# Patient Record
Sex: FEMALE | Race: WHITE | NOT HISPANIC OR LATINO | Employment: OTHER | URBAN - METROPOLITAN AREA
[De-identification: names, ages, dates, MRNs, and addresses within clinical notes are randomized per-mention and may not be internally consistent; named-entity substitution may affect disease eponyms.]

---

## 2017-01-26 DIAGNOSIS — Z00.00 ENCOUNTER FOR GENERAL ADULT MEDICAL EXAMINATION WITHOUT ABNORMAL FINDINGS: ICD-10-CM

## 2017-02-06 ENCOUNTER — HOSPITAL ENCOUNTER (OUTPATIENT)
Dept: RADIOLOGY | Age: 82
Discharge: HOME | End: 2017-02-06
Payer: MEDICARE

## 2017-02-06 DIAGNOSIS — Z12.31 ENCOUNTER FOR SCREENING MAMMOGRAM FOR MALIGNANT NEOPLASM OF BREAST: ICD-10-CM

## 2017-02-06 DIAGNOSIS — K29.70 GASTRITIS WITHOUT BLEEDING: ICD-10-CM

## 2017-02-06 DIAGNOSIS — H26.9 CATARACT: ICD-10-CM

## 2017-02-06 DIAGNOSIS — Z12.11 ENCOUNTER FOR SCREENING FOR MALIGNANT NEOPLASM OF COLON: ICD-10-CM

## 2017-02-06 DIAGNOSIS — R10.31 RIGHT LOWER QUADRANT PAIN: ICD-10-CM

## 2017-02-06 PROCEDURE — G0202 SCR MAMMO BI INCL CAD: HCPCS

## 2017-02-17 ENCOUNTER — ALLSCRIPTS OFFICE VISIT (OUTPATIENT)
Dept: OTHER | Facility: OTHER | Age: 82
End: 2017-02-17

## 2017-03-09 ENCOUNTER — GENERIC CONVERSION - ENCOUNTER (OUTPATIENT)
Dept: OTHER | Facility: OTHER | Age: 82
End: 2017-03-09

## 2017-03-10 ENCOUNTER — GENERIC CONVERSION - ENCOUNTER (OUTPATIENT)
Dept: OTHER | Facility: OTHER | Age: 82
End: 2017-03-10

## 2017-04-10 ENCOUNTER — ALLSCRIPTS OFFICE VISIT (OUTPATIENT)
Dept: OTHER | Facility: OTHER | Age: 82
End: 2017-04-10

## 2017-04-18 ENCOUNTER — ALLSCRIPTS OFFICE VISIT (OUTPATIENT)
Dept: OTHER | Facility: OTHER | Age: 82
End: 2017-04-18

## 2017-04-18 ENCOUNTER — HOSPITAL ENCOUNTER (OUTPATIENT)
Dept: RADIOLOGY | Age: 82
Discharge: HOME | End: 2017-04-18
Payer: MEDICARE

## 2017-04-18 ENCOUNTER — TRANSCRIBE ORDERS (OUTPATIENT)
Dept: ADMINISTRATIVE | Age: 82
End: 2017-04-18

## 2017-04-18 ENCOUNTER — APPOINTMENT (OUTPATIENT)
Dept: LAB | Age: 82
End: 2017-04-18
Payer: MEDICARE

## 2017-04-18 DIAGNOSIS — J40 BRONCHITIS: ICD-10-CM

## 2017-04-18 LAB
BASOPHILS # BLD AUTO: 0.01 THOUSANDS/ΜL (ref 0–0.1)
BASOPHILS NFR BLD AUTO: 0 % (ref 0–1)
EOSINOPHIL # BLD AUTO: 0.22 THOUSAND/ΜL (ref 0–0.61)
EOSINOPHIL NFR BLD AUTO: 2 % (ref 0–6)
ERYTHROCYTE [DISTWIDTH] IN BLOOD BY AUTOMATED COUNT: 13.9 % (ref 11.6–15.1)
HCT VFR BLD AUTO: 42 % (ref 34.8–46.1)
HGB BLD-MCNC: 13.6 G/DL (ref 11.5–15.4)
LYMPHOCYTES # BLD AUTO: 2.2 THOUSANDS/ΜL (ref 0.6–4.47)
LYMPHOCYTES NFR BLD AUTO: 24 % (ref 14–44)
MCH RBC QN AUTO: 29.6 PG (ref 26.8–34.3)
MCHC RBC AUTO-ENTMCNC: 32.4 G/DL (ref 31.4–37.4)
MCV RBC AUTO: 92 FL (ref 82–98)
MONOCYTES # BLD AUTO: 0.87 THOUSAND/ΜL (ref 0.17–1.22)
MONOCYTES NFR BLD AUTO: 9 % (ref 4–12)
NEUTROPHILS # BLD AUTO: 5.95 THOUSANDS/ΜL (ref 1.85–7.62)
NEUTS SEG NFR BLD AUTO: 65 % (ref 43–75)
NRBC BLD AUTO-RTO: 0 /100 WBCS
PLATELET # BLD AUTO: 305 THOUSANDS/UL (ref 149–390)
PMV BLD AUTO: 11 FL (ref 8.9–12.7)
RBC # BLD AUTO: 4.59 MILLION/UL (ref 3.81–5.12)
WBC # BLD AUTO: 9.29 THOUSAND/UL (ref 4.31–10.16)

## 2017-04-18 PROCEDURE — 85025 COMPLETE CBC W/AUTO DIFF WBC: CPT

## 2017-04-18 PROCEDURE — 71020 HB CHEST X-RAY 2VW FRONTAL&LATL: CPT

## 2017-04-18 PROCEDURE — 36415 COLL VENOUS BLD VENIPUNCTURE: CPT

## 2017-05-25 ENCOUNTER — TRANSCRIBE ORDERS (OUTPATIENT)
Dept: ADMINISTRATIVE | Age: 82
End: 2017-05-25

## 2017-05-25 ENCOUNTER — APPOINTMENT (OUTPATIENT)
Dept: LAB | Age: 82
End: 2017-05-25
Payer: MEDICARE

## 2017-05-25 DIAGNOSIS — I10 ESSENTIAL (PRIMARY) HYPERTENSION: ICD-10-CM

## 2017-05-25 DIAGNOSIS — Z12.11 ENCOUNTER FOR SCREENING FOR MALIGNANT NEOPLASM OF COLON: ICD-10-CM

## 2017-05-25 DIAGNOSIS — E78.2 MIXED HYPERLIPIDEMIA: ICD-10-CM

## 2017-05-25 DIAGNOSIS — M79.7 FIBROMYALGIA: ICD-10-CM

## 2017-05-25 DIAGNOSIS — F41.9 ANXIETY DISORDER: ICD-10-CM

## 2017-05-25 LAB
ALBUMIN SERPL BCP-MCNC: 3.7 G/DL (ref 3.5–5)
ALP SERPL-CCNC: 75 U/L (ref 46–116)
ALT SERPL W P-5'-P-CCNC: 22 U/L (ref 12–78)
ANION GAP SERPL CALCULATED.3IONS-SCNC: 6 MMOL/L (ref 4–13)
AST SERPL W P-5'-P-CCNC: 13 U/L (ref 5–45)
BACTERIA UR QL AUTO: ABNORMAL /HPF
BASOPHILS # BLD AUTO: 0.01 THOUSANDS/ΜL (ref 0–0.1)
BASOPHILS NFR BLD AUTO: 0 % (ref 0–1)
BILIRUB SERPL-MCNC: 0.52 MG/DL (ref 0.2–1)
BILIRUB UR QL STRIP: NEGATIVE
BUN SERPL-MCNC: 23 MG/DL (ref 5–25)
CALCIUM SERPL-MCNC: 9.5 MG/DL (ref 8.3–10.1)
CHLORIDE SERPL-SCNC: 108 MMOL/L (ref 100–108)
CHOLEST SERPL-MCNC: 290 MG/DL (ref 50–200)
CLARITY UR: ABNORMAL
CO2 SERPL-SCNC: 28 MMOL/L (ref 21–32)
COLOR UR: YELLOW
CREAT SERPL-MCNC: 0.7 MG/DL (ref 0.6–1.3)
EOSINOPHIL # BLD AUTO: 0.19 THOUSAND/ΜL (ref 0–0.61)
EOSINOPHIL NFR BLD AUTO: 3 % (ref 0–6)
ERYTHROCYTE [DISTWIDTH] IN BLOOD BY AUTOMATED COUNT: 13.6 % (ref 11.6–15.1)
GFR SERPL CREATININE-BSD FRML MDRD: >60 ML/MIN/1.73SQ M
GLUCOSE P FAST SERPL-MCNC: 93 MG/DL (ref 65–99)
GLUCOSE UR STRIP-MCNC: NEGATIVE MG/DL
HCT VFR BLD AUTO: 41.9 % (ref 34.8–46.1)
HDLC SERPL-MCNC: 64 MG/DL (ref 40–60)
HEMOCCULT STL QL IA: POSITIVE
HGB BLD-MCNC: 13.7 G/DL (ref 11.5–15.4)
HGB UR QL STRIP.AUTO: NEGATIVE
HYALINE CASTS #/AREA URNS LPF: ABNORMAL /LPF
KETONES UR STRIP-MCNC: NEGATIVE MG/DL
LDLC SERPL CALC-MCNC: 200 MG/DL (ref 0–100)
LEUKOCYTE ESTERASE UR QL STRIP: ABNORMAL
LYMPHOCYTES # BLD AUTO: 1.98 THOUSANDS/ΜL (ref 0.6–4.47)
LYMPHOCYTES NFR BLD AUTO: 29 % (ref 14–44)
MCH RBC QN AUTO: 29.7 PG (ref 26.8–34.3)
MCHC RBC AUTO-ENTMCNC: 32.7 G/DL (ref 31.4–37.4)
MCV RBC AUTO: 91 FL (ref 82–98)
MONOCYTES # BLD AUTO: 0.77 THOUSAND/ΜL (ref 0.17–1.22)
MONOCYTES NFR BLD AUTO: 11 % (ref 4–12)
NEUTROPHILS # BLD AUTO: 3.95 THOUSANDS/ΜL (ref 1.85–7.62)
NEUTS SEG NFR BLD AUTO: 57 % (ref 43–75)
NITRITE UR QL STRIP: POSITIVE
NON-SQ EPI CELLS URNS QL MICRO: ABNORMAL /HPF
NRBC BLD AUTO-RTO: 0 /100 WBCS
PH UR STRIP.AUTO: 6 [PH] (ref 4.5–8)
PLATELET # BLD AUTO: 273 THOUSANDS/UL (ref 149–390)
PMV BLD AUTO: 11.1 FL (ref 8.9–12.7)
POTASSIUM SERPL-SCNC: 4.5 MMOL/L (ref 3.5–5.3)
PROT SERPL-MCNC: 7.2 G/DL (ref 6.4–8.2)
PROT UR STRIP-MCNC: NEGATIVE MG/DL
RBC # BLD AUTO: 4.62 MILLION/UL (ref 3.81–5.12)
RBC #/AREA URNS AUTO: ABNORMAL /HPF
SODIUM SERPL-SCNC: 142 MMOL/L (ref 136–145)
SP GR UR STRIP.AUTO: 1.02 (ref 1–1.03)
TRIGL SERPL-MCNC: 128 MG/DL
UROBILINOGEN UR QL STRIP.AUTO: 0.2 E.U./DL
WBC # BLD AUTO: 6.91 THOUSAND/UL (ref 4.31–10.16)
WBC #/AREA URNS AUTO: ABNORMAL /HPF

## 2017-05-25 PROCEDURE — 81001 URINALYSIS AUTO W/SCOPE: CPT

## 2017-05-25 PROCEDURE — 80061 LIPID PANEL: CPT

## 2017-05-25 PROCEDURE — 85025 COMPLETE CBC W/AUTO DIFF WBC: CPT

## 2017-05-25 PROCEDURE — G0328 FECAL BLOOD SCRN IMMUNOASSAY: HCPCS

## 2017-05-25 PROCEDURE — 36415 COLL VENOUS BLD VENIPUNCTURE: CPT

## 2017-05-25 PROCEDURE — 80053 COMPREHEN METABOLIC PANEL: CPT

## 2017-06-05 ENCOUNTER — GENERIC CONVERSION - ENCOUNTER (OUTPATIENT)
Dept: OTHER | Facility: OTHER | Age: 82
End: 2017-06-05

## 2017-06-16 ENCOUNTER — ALLSCRIPTS OFFICE VISIT (OUTPATIENT)
Dept: OTHER | Facility: OTHER | Age: 82
End: 2017-06-16

## 2017-08-10 ENCOUNTER — GENERIC CONVERSION - ENCOUNTER (OUTPATIENT)
Dept: OTHER | Facility: OTHER | Age: 82
End: 2017-08-10

## 2017-08-30 ENCOUNTER — APPOINTMENT (OUTPATIENT)
Dept: LAB | Age: 82
End: 2017-08-30
Payer: MEDICARE

## 2017-08-30 ENCOUNTER — TRANSCRIBE ORDERS (OUTPATIENT)
Dept: ADMINISTRATIVE | Age: 82
End: 2017-08-30

## 2017-08-30 DIAGNOSIS — E55.9 UNSPECIFIED VITAMIN D DEFICIENCY: ICD-10-CM

## 2017-08-30 DIAGNOSIS — E55.9 UNSPECIFIED VITAMIN D DEFICIENCY: Primary | ICD-10-CM

## 2017-08-30 LAB
25(OH)D3 SERPL-MCNC: 17.1 NG/ML (ref 30–100)
ANION GAP SERPL CALCULATED.3IONS-SCNC: 8 MMOL/L (ref 4–13)
BUN SERPL-MCNC: 27 MG/DL (ref 5–25)
CALCIUM SERPL-MCNC: 9 MG/DL (ref 8.3–10.1)
CHLORIDE SERPL-SCNC: 104 MMOL/L (ref 100–108)
CO2 SERPL-SCNC: 25 MMOL/L (ref 21–32)
CREAT SERPL-MCNC: 0.8 MG/DL (ref 0.6–1.3)
GFR SERPL CREATININE-BSD FRML MDRD: 70 ML/MIN/1.73SQ M
GLUCOSE SERPL-MCNC: 73 MG/DL (ref 65–140)
POTASSIUM SERPL-SCNC: 4 MMOL/L (ref 3.5–5.3)
SODIUM SERPL-SCNC: 137 MMOL/L (ref 136–145)

## 2017-08-30 PROCEDURE — 80048 BASIC METABOLIC PNL TOTAL CA: CPT

## 2017-08-30 PROCEDURE — 82306 VITAMIN D 25 HYDROXY: CPT

## 2017-08-30 PROCEDURE — 36415 COLL VENOUS BLD VENIPUNCTURE: CPT

## 2017-09-14 ENCOUNTER — ALLSCRIPTS OFFICE VISIT (OUTPATIENT)
Dept: OTHER | Facility: OTHER | Age: 82
End: 2017-09-14

## 2017-10-16 ENCOUNTER — ALLSCRIPTS OFFICE VISIT (OUTPATIENT)
Dept: OTHER | Facility: OTHER | Age: 82
End: 2017-10-16

## 2017-10-26 NOTE — PROGRESS NOTES
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 for management of her fibromyalgia  #2 anxiety condition increase Xanax to 0 25 mg twice a day she's only been taking 1 tablet in the morning and half in the afternoon  She may now use one tablet twice a day in addition if she awakes in the middle of the night and has difficulty getting back to sleep she can take a pill at that time of Xanax 0 25 mg  #3 hypertension adequate control changed medication  #4 history of back pain in the neck and mid back this is with trigger points present likely to be fibromyalgia in nature  Previous attempts to treat her with antidepressant medications have proven unsuccessful due to side effects of the medication  #5 likely due to tension and or sinus symptoms  She is encouraged to continue with her Claritin and fluticasone nasal spray for management of her allergy symptoms  #6 abdominal bloating symptoms within normal  Examination of the abdomen  I suspect some of this is irritable bowel possibly as well as some degree of food intolerance  I've discussed with her the possibility of lactose intolerance and also a list of foods was discussed to decrease in her diet which may help decrease her intestinal flatulence  Discussion/Summary    In summary was spent approximate 45 minutes with this patient today on examination as well as discussion  We reviewed her multiple symptoms   I suspect that she has increasing stress levels which are causing a lot of her somatic symptoms such as sleep disturbance intestinal bloating and increasing her fibromyalgia symptoms  She has found a care provider for her  who is there for one day E3 hours that day so she does get occasional break  Encourage her to continue a respite visit for her  to a nursing home  total time of encounter was 45 minutes-- and-- 35 minutes was spent counseling  Chief Complaint  Patient is here today for a fibromyalgia flare up      History of Present Illness  HPI: This is an acute visit for this [de-identified] female patient  She presents today with multiple somatic symptoms  She indicates that she has felt bloated in the abdomen for several days with no flatulence or belching  She indicates that she is not feeling well and has an increase in her fibromyalgia symptoms in her neck and back region  She also indicates that she is had increased arthritic symptoms especially in her left knee and right hip area  She is not been sleeping well wakes up at night and has increased difficulty falling back to sleep  In addition she's had generalized headaches at times  She denies any fevers or chills  Review of Systems    Constitutional: feeling poorly-- and-- feeling tired, but-- no fever-- and-- no chills  ENT: Some nasal congestion and ear congestion  Cardiovascular: no complaints of slow or fast heart rate, no chest pain, no palpitations, no leg claudication or lower extremity edema  Respiratory: no complaints of shortness of breath, no wheezing, no dyspnea on exertion, no orthopnea or PND  Breasts: no complaints of breast pain, breast lump or nipple discharge  Gastrointestinal: A bloating feeling in the abdomen  Genitourinary: no complaints of dysuria, no incontinence, no pelvic pain, no dysmenorrhea, no vaginal discharge or abnormal vaginal bleeding  Musculoskeletal: arthralgias-- and-- myalgias, but-- as noted in HPI  Integumentary: no complaints of skin rash or lesion, no itching or dry skin, no skin wounds  Neurological: headache  Other Symptoms: Anxiety  Active Problems  1  Allergic rhinitis (477 9) (J30 9)   2  Anxiety (300 00) (F41 9)   3  Asymptomatic varicose veins (454 9) (I83 90)   4  Cataract, left (366 9) (H26 9)   5  Fatigue (780 79) (R53 83)   6  Fibromyalgia (729 1) (M79 7)   7  Gastritis (535 50) (K29 70)   8  Heme positive stool (792 1) (R19 5)   9  Hypertension (401 9) (I10)   10  Mid back pain (724 5) (M54 9)   11  Mixed hyperlipidemia (272 2) (E78 2)   12  Osteoarthritis (715 90) (M19 90)   13  Screen for colon cancer (V76 51) (Z12 11)   14  UTI symptoms (788 99) (R39 9)   15  Varicose veins (454 9) (I83 90)   16  Vitamin D deficiency (268 9) (E55 9)    Past Medical History  Active Problems And Past Medical History Reviewed: The active problems and past medical history were reviewed and updated today  Surgical History  Surgical History Reviewed: The surgical history was reviewed and updated today  Social History   · Never a smoker  The social history was reviewed and updated today  The social history was reviewed and is unchanged  Family History  Family History Reviewed: The family history was reviewed and updated today  Current Meds   1  ALPRAZolam 0 25 MG Oral Tablet; TAKE 1 TABLET TWICE DAILY; Therapy: 39EIT2377 to (Evaluate:51Qcm6216); Last Rx:00Kyu7995 Ordered   2  AmLODIPine Besylate 5 MG Oral Tablet; take 1 tablet by mouth every day; Therapy: 31AMG0111 to (Lilian Silva)  Requested for: 38YEE9679; Last   RA:95BUE3454 Ordered   3  Aspirin 81 MG TABS; Therapy: [phone]) to Recorded   4  Claritin 10 MG Oral Tablet; Therapy: [phone]) to Recorded   5  Fluticasone Propionate 50 MCG/ACT Nasal Suspension; USE 1 SPRAY IN EACH   NOSTRIL TWICE DAILY;    Therapy: 11PZY6987 to [phone])  Requested for: 26Jun2017; Last   AR:68WAT9762 Ordered   6  Lisinopril 40 MG Oral Tablet; Take 1 tablet daily; Therapy: 24TJO7095 to (PZVFZTCM:23WKX0960)  Requested for: 41IUS7624; Last   Rx:09Jan2017 Ordered   7  Lumigan SOLN;   Therapy: (Recorded:21Apr2016) to Recorded   8  Restasis 0 05 % Ophthalmic Emulsion; Therapy: ([phone]) to Recorded   9  Timolol Maleate 0 5 % Ophthalmic Solution; INSTILL 1 DROP INTO RIGHT EYE 2 TIMES   DAILY Recorded   10  Vitamin D 1000 UNIT Oral Tablet; TAKE 2 TABLET Daily; Therapy: 65IYU5113 to (Last Rx:23Fvo1714) Ordered    The medication list was reviewed and updated today  Allergies  1  Ciprofloxacin HCl TABS   2  Macrobid   3  Sulfa Drugs  4  Seasonal    Vitals   Recorded: 14Sep2017 02:28PM   Temperature 97 5 F   Heart Rate 88   Systolic 310   Diastolic 78   Height 5 ft 4 5 in   Weight 172 lb    BMI Calculated 29 07   BSA Calculated 1 84   O2 Saturation 93     Physical Exam    Constitutional   General appearance: No acute distress, well appearing and well nourished  Eyes   Conjunctiva and lids: No swelling, erythema or discharge  Ears, Nose, Mouth, and Throat   External inspection of ears and nose: Normal     Otoscopic examination: Tympanic membranes translucent with normal light reflex  Canals patent without erythema  Nasal mucosa, septum, and turbinates: Abnormal  -- Mild allergic irritation in the membranes with clear mucus in both nasal passages  Oropharynx: Normal with no erythema, edema, exudate or lesions  Pulmonary   Respiratory effort: No increased work of breathing or signs of respiratory distress  Auscultation of lungs: Clear to auscultation  Cardiovascular   Auscultation of heart: Normal rate and rhythm, normal S1 and S2, without murmurs      Examination of extremities for edema and/or varicosities: Normal     Abdomen   Abdomen: Abnormal  -- Normal active bowel sounds in all 4 quadrants mild bloating of the abdomen with tympanic sounds over the intestines no localizing tenderness or masses noted  Liver and spleen: No hepatomegaly or splenomegaly  Psychiatric   Orientation to person, place, and time: Normal     Mood and affect: Abnormal  -- Anxious  Future Appointments    Date/Time Provider Specialty Site   10/16/2017 01:45 PM JORDANA Phan   Internal Medicine [facility] INTERNAL MED     Signatures   Electronically signed by : JORDANA Sullivan ; Sep 14 2017  8:02PM EST                       (Author)

## 2017-11-01 NOTE — PROGRESS NOTES
Assessment    1  Anxiety (300 00) (F41 9)   2  Fibromyalgia (729 1) (M79 7)   3  Hypertension (401 9) (I10)   4  Mixed hyperlipidemia (272 2) (E78 2)   5  Allergic rhinitis (477 9) (J30 9)    Plan  Need for immunization against influenza    · Fluzone High-Dose 0 5 ML Intramuscular Suspension Prefilled Syringe  #1 anxiety secondary to caregiving responsibilities of the patient for her  who has advancing dementia  We did discuss possibility of a respite placement for her  at the present time she does not feel that that is necessary  The patient can continue to utilizes alprazolam 0 25 one half to one tablet 2-3 times daily for relief of her anxiety and stress  #2 hypertension adequate control continue on present therapy  #3 fibromyalgia symptoms worsening with stress levels increasing as well as a cooling temperatures Ambien only  That we did discuss possibly going back for physical therapy at present time she prefer not to pursue this  We'll continue on stretching and nonsteroidal anti-inflammatory therapy  We did discuss the possibility of using Lyrica for treatment of her fibromyalgia symptoms the patient is reluctant to use the medication due to the number of side effects noted with this drug  #4 mixed hyperlipidemia patient will continue on diet that is controlled in saturated fats  #5 allergic rhinitis the patient has found that a combination of Claritin and fluticasone nasal spray has been successful at controlling her allergic symptoms  Discussion/Summary  Discussion Summary:   In summary the patient presents today for routine follow-up visit we discussed at length her stress levels being elevated due to being a primary caregiver for her fate  who has advancing dementia  He continues to have well-controlled hypertension and will continue on lisinopril 40 mg daily and amlodipine 5 mg daily   She has a history of allergic rhinitis which is well-controlled on a combination of Claritin and fluticasone  Her fibromyalgia has flared recently most likely due to the stresses in her life  We discussed ways of controlling it including stretching exercises as well as nonsteroidal anti-inflammatories  Counseling Documentation With Imm: total time of encounter was 35 minutes-- and-- 25 minutes was spent counseling  Chief Complaint  Chief Complaint Free Text Note Form: patient is here for a 4 month follow up  History of Present Illness  HPI: This is a routine 4 month follow-up visit for this 82-year-old female patient  She continues to have symptoms of fibromyalgia  The symptoms are intensified by the high level of stress that she is functioning under being 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  Patient also has symptoms of anxiety at times and is utilizing alprazolam on an as-needed basis with good results  She has a history of hypertension and hyperlipidemia as well  Review of Systems  Complete-Female:  Constitutional: feeling poorly-- and-- feeling tired  Eyes: No complaints of eye pain, no red eyes, no eyesight problems, no discharge, no dry eyes, no itching of eyes  ENT: no complaints of earache, no loss of hearing, no nose bleeds, no nasal discharge, no sore throat, no hoarseness  Cardiovascular: No complaints of slow heart rate, no fast heart rate, no chest pain, no palpitations, no leg claudication, no lower extremity edema  Respiratory: No complaints of shortness of breath, no wheezing, no cough, no SOB on exertion, no orthopnea, no PND  Gastrointestinal: No complaints of abdominal pain, no constipation, no nausea or vomiting, no diarrhea, no bloody stools  Genitourinary: No complaints of dysuria, no incontinence, no pelvic pain, no dysmenorrhea, no vaginal discharge or bleeding  Musculoskeletal: Fibromyalgia-type pain with trigger points in the neck region and back region    Integumentary: No complaints of skin rash or lesions, no itching, no skin wounds, no breast pain or lump  Neurological: No complaints of headache, no confusion, no convulsions, no numbness, no dizziness or fainting, no tingling, no limb weakness, no difficulty walking  Psychiatric: anxiety  Endocrine: No complaints of proptosis, no hot flashes, no muscle weakness, no deepening of the voice, no feelings of weakness  Hematologic/Lymphatic: No complaints of swollen glands, no swollen glands in the neck, does not bleed easily, does not bruise easily  Active Problems  1  Abdominal bloating (787 3) (R14 0)   2  Allergic rhinitis (477 9) (J30 9)   3  Anxiety (300 00) (F41 9)   4  Asymptomatic varicose veins (454 9) (I83 90)   5  Cataract, left (366 9) (H26 9)   6  Fatigue (780 79) (R53 83)   7  Fibromyalgia (729 1) (M79 7)   8  Gastritis (535 50) (K29 70)   9  Headache (784 0) (R51)   10  Heme positive stool (792 1) (R19 5)   11  Hypertension (401 9) (I10)   12  Mid back pain (724 5) (M54 9)   13  Mixed hyperlipidemia (272 2) (E78 2)   14  Osteoarthritis (715 90) (M19 90)   15  Screen for colon cancer (V76 51) (Z12 11)   16  Sleep disturbance (780 50) (G47 9)   17  UTI symptoms (788 99) (R39 9)   18  Varicose veins (454 9) (I83 90)   19  Vitamin D deficiency (268 9) (E55 9)    Past Medical History  1  History of Acute lower UTI (599 0) (N39 0)   2  History of Encounter for screening mammogram for breast cancer (V76 12) (Z12 31)   3  History of bronchitis (V12 69) (Z87 09)   4  History of epistaxis (V12 69) (Z87 898)   5  History of influenza (V12 09) (Z87 09)   6  History of urinary tract infection (V13 02) (Z87 440)   7  History of Right lower quadrant abdominal pain (789 03) (R10 31)   8  History of Saphenous vein thrombophlebitis, right (451 0) (I80 01)  Active Problems And Past Medical History Reviewed: The active problems and past medical history were reviewed and updated today  Surgical History  1  History of Gallbladder Surgery  Surgical History Reviewed:    The surgical history was reviewed and updated today  Family History  Mother    1  Family history of Hypertension  Family History Reviewed: The family history was reviewed and updated today  Social History     · Never a smoker  Social History Reviewed: The social history was reviewed and updated today  The social history was reviewed and is unchanged  Current Meds   1  ALPRAZolam 0 25 MG Oral Tablet; TAKE 1 TABLET TWICE DAILY; Therapy: 27ZHX0785 to (Evaluate:88Ulm5439); Last Rx:69Ubc2562 Ordered   2  AmLODIPine Besylate 5 MG Oral Tablet; take 1 tablet by mouth every day; Therapy: 78HMU5841 to (Isai Cobos)  Requested for: 71NKU9806; Last LS:22YQB7705 Ordered   3  Aspirin 81 MG TABS; Therapy: (0372-5610005) to Recorded   4  Claritin 10 MG Oral Tablet; Therapy: (0372-5610005) to Recorded   5  Fluticasone Propionate 50 MCG/ACT Nasal Suspension; USE 1 SPRAY IN EACH NOSTRIL TWICE DAILY; Therapy: 00PLH9831 to (NKDCVNBI:40XNH5861)  Requested for: 26Jun2017; Last NX:31ZPD4256 Ordered   6  Lisinopril 40 MG Oral Tablet; Take 1 tablet daily; Therapy: 18VBX9937 to (AMWWNVES:98NTY1734)  Requested for: 58LFF1015; Last Rx:09Jan2017 Ordered   7  Lumigan SOLN; Therapy: (Recorded:21Apr2016) to Recorded   8  RaNITidine HCl - 150 MG Oral Tablet; One pill daily prior to breakfast; Therapy: 97Cze2155 to (Evaluate:12Apr2018)  Requested for: 07Boe4731; Last Rx:86Jvv2112 Ordered   9  Restasis 0 05 % Ophthalmic Emulsion; Therapy: (5332-5610005) to Recorded   10  Timolol Maleate 0 5 % Ophthalmic Solution; INSTILL 1 DROP INTO RIGHT EYE 2 TIMES DAILY  Recorded   11  Vitamin D 1000 UNIT Oral Tablet; TAKE 2 TABLET Daily; Therapy: 57EIR9756 to (Last Rx:89Hri7167) Ordered  Medication List Reviewed: The medication list was reviewed and updated today  Allergies  1  Ciprofloxacin HCl TABS   2  Macrobid   3  Sulfa Drugs  4   Seasonal    Vitals  Vital Signs    Recorded: 33NXW9595 01:35PM   Temperature 97 3 F   Heart Rate 98   Respiration 14   Systolic 908   Diastolic 74   Height 5 ft 4 5 in   Weight 171 lb 0 8 oz   BMI Calculated 28 91   BSA Calculated 1 84   O2 Saturation 99       Physical Exam   Constitutional  General appearance: No acute distress, well appearing and well nourished  Eyes  Conjunctiva and lids: No swelling, erythema or discharge  Ears, Nose, Mouth, and Throat  External inspection of ears and nose: Normal    Pulmonary  Respiratory effort: No increased work of breathing or signs of respiratory distress  Auscultation of lungs: Clear to auscultation  Cardiovascular  Auscultation of heart: Normal rate and rhythm, normal S1 and S2, without murmurs  Future Appointments    Date/Time Provider Specialty Site   12/12/2017 02:45 PM JORDANA Palmer   Internal Medicine [facility] INTERNAL MED       Signatures   Electronically signed by : JORDANA Encarnacion ; Oct 16 2017  7:35PM EST                       (Author)

## 2017-11-13 ENCOUNTER — GENERIC CONVERSION - ENCOUNTER (OUTPATIENT)
Dept: INTERNAL MEDICINE CLINIC | Facility: CLINIC | Age: 82
End: 2017-11-13

## 2017-11-13 ENCOUNTER — GENERIC CONVERSION - ENCOUNTER (OUTPATIENT)
Dept: OTHER | Facility: OTHER | Age: 82
End: 2017-11-13

## 2017-12-12 ENCOUNTER — GENERIC CONVERSION - ENCOUNTER (OUTPATIENT)
Dept: OTHER | Facility: OTHER | Age: 82
End: 2017-12-12

## 2017-12-12 DIAGNOSIS — I10 ESSENTIAL (PRIMARY) HYPERTENSION: ICD-10-CM

## 2017-12-12 DIAGNOSIS — E55.9 VITAMIN D DEFICIENCY: ICD-10-CM

## 2017-12-12 DIAGNOSIS — E78.2 MIXED HYPERLIPIDEMIA: ICD-10-CM

## 2018-01-12 VITALS
WEIGHT: 173.38 LBS | HEART RATE: 102 BPM | SYSTOLIC BLOOD PRESSURE: 124 MMHG | RESPIRATION RATE: 16 BRPM | OXYGEN SATURATION: 98 % | HEIGHT: 65 IN | TEMPERATURE: 98.4 F | DIASTOLIC BLOOD PRESSURE: 72 MMHG | BODY MASS INDEX: 28.89 KG/M2

## 2018-01-13 VITALS
SYSTOLIC BLOOD PRESSURE: 118 MMHG | BODY MASS INDEX: 29.3 KG/M2 | OXYGEN SATURATION: 97 % | RESPIRATION RATE: 16 BRPM | DIASTOLIC BLOOD PRESSURE: 68 MMHG | HEART RATE: 72 BPM | WEIGHT: 173.4 LBS | TEMPERATURE: 97.1 F

## 2018-01-14 VITALS
OXYGEN SATURATION: 98 % | SYSTOLIC BLOOD PRESSURE: 112 MMHG | RESPIRATION RATE: 16 BRPM | HEART RATE: 92 BPM | BODY MASS INDEX: 28.7 KG/M2 | DIASTOLIC BLOOD PRESSURE: 70 MMHG | WEIGHT: 172.25 LBS | HEIGHT: 65 IN | TEMPERATURE: 97.7 F

## 2018-01-14 VITALS
HEART RATE: 98 BPM | DIASTOLIC BLOOD PRESSURE: 74 MMHG | TEMPERATURE: 97.3 F | RESPIRATION RATE: 14 BRPM | BODY MASS INDEX: 28.5 KG/M2 | WEIGHT: 171.05 LBS | OXYGEN SATURATION: 99 % | SYSTOLIC BLOOD PRESSURE: 124 MMHG | HEIGHT: 65 IN

## 2018-01-14 VITALS
DIASTOLIC BLOOD PRESSURE: 78 MMHG | HEART RATE: 88 BPM | HEIGHT: 65 IN | TEMPERATURE: 97.5 F | OXYGEN SATURATION: 93 % | WEIGHT: 172 LBS | SYSTOLIC BLOOD PRESSURE: 114 MMHG | BODY MASS INDEX: 28.66 KG/M2

## 2018-01-15 DIAGNOSIS — Z12.31 ENCOUNTER FOR SCREENING MAMMOGRAM FOR MALIGNANT NEOPLASM OF BREAST: ICD-10-CM

## 2018-01-22 VITALS
SYSTOLIC BLOOD PRESSURE: 128 MMHG | WEIGHT: 13.04 LBS | TEMPERATURE: 97.4 F | BODY MASS INDEX: 2.17 KG/M2 | HEIGHT: 65 IN | DIASTOLIC BLOOD PRESSURE: 64 MMHG | HEART RATE: 87 BPM | RESPIRATION RATE: 14 BRPM | OXYGEN SATURATION: 96 %

## 2018-01-22 VITALS
HEART RATE: 85 BPM | BODY MASS INDEX: 28.55 KG/M2 | WEIGHT: 171.38 LBS | TEMPERATURE: 97.5 F | RESPIRATION RATE: 16 BRPM | OXYGEN SATURATION: 98 % | HEIGHT: 65 IN | DIASTOLIC BLOOD PRESSURE: 70 MMHG | SYSTOLIC BLOOD PRESSURE: 124 MMHG

## 2018-01-22 VITALS — BODY MASS INDEX: 29.24 KG/M2 | WEIGHT: 173 LBS

## 2018-01-24 VITALS
SYSTOLIC BLOOD PRESSURE: 118 MMHG | HEART RATE: 81 BPM | RESPIRATION RATE: 14 BRPM | DIASTOLIC BLOOD PRESSURE: 66 MMHG | OXYGEN SATURATION: 96 % | BODY MASS INDEX: 29.16 KG/M2 | TEMPERATURE: 97.3 F | WEIGHT: 175.04 LBS | HEIGHT: 65 IN

## 2018-01-24 NOTE — PROGRESS NOTES
Assessment    1  Allergic rhinitis (477 9) (J30 9)   2  Anxiety (300 00) (F41 9)   3  Asymptomatic varicose veins (454 9) (I83 90)   4  Gastritis (535 50) (K29 70)   5  Hypertension (401 9) (I10)   6  Vitamin D deficiency (268 9) (E55 9)    Plan  Allergic rhinitis, Anxiety, Health Maintenance, Fibromyalgia, Gastritis, Hypertension,  Mixed hyperlipidemia    · (1) CBC/PLT/DIFF; Status:Active; Requested for:46Vdh1604;    · (1) COMPREHENSIVE METABOLIC PANEL; Status:Active; Requested for:92Uaw3050;    · (1) LIPID PANEL FASTING W DIRECT LDL REFLEX; Status:Active; Requested  for:45Bpl2434; Allergic rhinitis, Anxiety, Health Maintenance, Fibromyalgia, Gastritis, Hypertension,  Mixed hyperlipidemia, Screen for colon cancer    · (1) OCCULT BLOOD, FECAL IMMUNOCHEMICAL TEST; Status:Active; Requested  for:17Feb2017;    · (1) URINALYSIS (will reflex a microscopy if leukocytes, occult blood, protein or nitrites  are not within normal limits); Status:Active; Requested for:02Hhi8396; Anxiety    · ALPRAZolam 0 25 MG Oral Tablet (Xanax); TAKE 1 TABLET TWICE DAILY  Hypertension    · EKG/ECG- POC; Status:Active - Perform Order; Requested for:17Feb2017;   Need for vaccination with 13-polyvalent pneumococcal conjugate vaccine    · Prevnar 13 Intramuscular Suspension    #1 allergic rhinitis continue present medications of nasal steroids and antihistamine treatment for symptomatic control  #2 history of anxiety patient is under increased stress levels due to  who has advancing dementia  She utilizes alprazolam 0 25 mg twice a day with good control of her anxiety symptoms  #3 asymptomatic varicose veins in the legs bilaterally no evidence of any venous thrombosis or localized irritation  Intervention support stockings  #4 history of gastritis symptoms presently taking famotidine 20 mg twice a day for symptomatic control  #5 hypertension adequate control continue amlodipine medication along with lisinopril medication    #5 history of vitamin D deficiency on replacement plan on laboratory assessment of vitamin D levels  Discussion/Summary  In summary the patient appears to be medically stable we've asked her to obtain a laboratory testing for routine assessment of blood count chemistries lipids vitamin D value  See the patient for her next routine blood pressure checkup in 4 months  Reviewed the need for testing testing including mammogram OB/GYN visit and colonoscopy at 5 year intervals  The lifestyle and diet were reviewed with the patient  Chief Complaint  patient is here for an AWV  History of Present Illness  HPI: 68-year-old female patient presents today for annual wellness visit  She has no new issues or complaints  She has a history of attention stridorous osteoarthritis vitamin D deficiency varicose veins of the lower legs anxiety and allergic rhinitis  Welcome to [employer] and Wellness Visits: The patient is being seen for the initial annual wellness visit  Medicare Screening and Risk Factors   Hospitalizations: no previous hospitalizations  Once per lifetime medicare screening tests: ECG  Medicare Screening Tests Risk Questions   Drug and Alcohol Use: The patient has never smoked cigarettes  The patient reports never drinking alcohol and drinking 1 drinks per month  She has never used illicit drugs  Diet and Physical Activity: Current diet includes well balanced meals  She exercises daily  Exercise: walking, stretching  Mood Disorder and Cognitive Impairment Screening:   Depression screening was done using  Functional Ability/Level of Safety: Hearing is normal bilaterally and a hearing aid is not used  Fall risk factors: The patient fell 0 times in the past 12 months  Advance Directives: Advance directives: unknown  Concerns with the patient's end of life decisions: unknown     Co-Managers and Medical Equipment/Suppliers: See Patient Care Team      Patient Care Team    Care Team Member Role Specialty Office Number   Keyanna Wood [facility]  Vascular Surgery [phone]     Review of Systems    Constitutional: negative  Eyes: negative  ENT: negative  Cardiovascular: negative  Respiratory: negative  Gastrointestinal: negative  Genitourinary: negative  Musculoskeletal: negative  Integumentary and Breasts: negative  Neurological: negative  Psychiatric: negative  Endocrine: negative  Hematologic and Lymphatic: negative  Active Problems    1  Allergic rhinitis (477 9) (J30 9)   2  Anxiety (300 00) (F41 9)   3  Asymptomatic varicose veins (454 9) (I83 90)   4  Cataract, left (366 9) (H26 9)   5  Dry eye syndrome (375 15) (H04 129)   6  Fibromyalgia (729 1) (M79 7)   7  Gastritis (535 50) (K29 70)   8  Hypertension (401 9) (I10)   9  Mixed hyperlipidemia (272 2) (E78 2)   10  Need for immunization against influenza (V04 81) (Z23)   11  Osteoarthritis (715 90) (M19 90)   12  Screen for colon cancer (V76 51) (Z12 11)   13  Varicose veins (454 9) (I86 8)   14  Vitamin D deficiency (268 9) (E55 9)    Past Medical History    · History of Encounter for screening mammogram for breast cancer (V76 12) (Z12 31)   · History of epistaxis (V12 69) (G32 993)   · History of influenza (V12 09) (Z87 09)   · History of urinary tract infection (V13 02) (Z87 440)   · History of Right lower quadrant abdominal pain (789 03) (R10 31)   · History of Saphenous vein thrombophlebitis, right (451 0) (I80 01)    The active problems and past medical history were reviewed and updated today  Surgical History    · History of Gallbladder Surgery    The surgical history was reviewed and updated today  Family History  Mother    · Family history of Hypertension    The family history was reviewed and updated today  Social History    · Never a smoker  The social history was reviewed and updated today  The social history was reviewed and is unchanged  Current Meds   1   ALPRAZolam 0 25 MG Oral Tablet; TAKE 1 TABLET TWICE DAILY; Last Rx:03Nov2016   Ordered   2  AmLODIPine Besylate 5 MG Oral Tablet; Take 1 tablet daily; Therapy: 43ZWM3691 to (Evaluate:19Apr2017)  Requested for: 75Qri5214; Last   Rx:21Sep2016 Ordered   3  Aspirin 81 MG TABS; Therapy: (73 811 282) to Recorded   4  Claritin 10 MG Oral Tablet; Therapy: (73 811 282) to Recorded   5  Famotidine 20 MG Oral Tablet; take 1 tablet twice a day; Therapy: 93GPI7894 to (Last Rx:09Nov2016) Ordered   6  Fluticasone Propionate 50 MCG/ACT Nasal Suspension; USE 1 SPRAY IN EACH   NOSTRIL TWICE DAILY; Therapy: 78OZU6445 to (Evaluate:06Jun2017)  Requested for: 39TMO3747; Last   Rx:08Nov2016 Ordered   7  Lisinopril 40 MG Oral Tablet; Take 1 tablet daily; Therapy: 36KPM4344 to (FKPJKCAR:75QOB2268)  Requested for: 46BXI1658; Last   Rx:09Jan2017 Ordered   8  Lumigan SOLN;   Therapy: (Recorded:21Apr2016) to Recorded   9  Oseltamivir Phosphate 75 MG Oral Capsule; TAKE 1 CAPSULE TWICE DAILY; Therapy: 15ETV1851 to (Evaluate:16Jan2017)  Requested for: 56OXI3231; Last   Rx:11Jan2017 Ordered   10  Prochlorperazine Maleate 10 MG Oral Tablet; TAKE 1 TABLET EVERY 6 HOURS AS    NEEDED FOR NAUSEA; Therapy: 58EDX6572 to (Evaluate:17Jan2017)  Requested for: 98CVO6685; Last    Rx:11Jan2017 Ordered   11  Restasis 0 05 % Ophthalmic Emulsion; Therapy: (73 811 282) to Recorded   12  Timolol Maleate 0 5 % Ophthalmic Solution; INSTILL 1 DROP INTO RIGHT EYE 2    TIMES DAILY Recorded   13  Vitamin D 1000 UNIT Oral Tablet; TAKE 2 TABLET Daily; Therapy: 56SCZ5798 to (Last Rx:52Rew2614) Ordered    The medication list was reviewed and updated today  Allergies    1  Ciprofloxacin HCl TABS   2  Sulfa Drugs    3   Seasonal    Immunizations   1    Influenza  18-Oct-2016     Vitals  Signs    Temperature: 97 5 F  Heart Rate: 85  Respiration: 16  Systolic: 532  Diastolic: 70  Height: 5 ft 4 5 in  Weight: 171 lb 6 08 oz  BMI Calculated: 28 96  BSA Calculated: 1 84  O2 Saturation: 98    Physical Exam    Constitutional   General appearance: No acute distress, well appearing and well nourished  Head and Face   Head and face: Normal     Eyes   Conjunctiva and lids: No swelling, erythema or discharge  Pupils and irises: Equal, round, reactive to light  Ophthalmoscopic examination: Normal fundi and optic discs  Ears, Nose, Mouth, and Throat   External inspection of ears and nose: Normal     Otoscopic examination: Tympanic membranes translucent with normal light reflex  Canals patent without erythema  Nasal mucosa, septum, and turbinates: Normal without edema or erythema  Lips, teeth, and gums: Normal, good dentition  Oropharynx: Normal with no erythema, edema, exudate or lesions  Neck   Neck: Supple, symmetric, trachea midline, no masses  Thyroid: Normal, no thyromegaly  Pulmonary   Respiratory effort: No increased work of breathing or signs of respiratory distress  Percussion of chest: Normal     Auscultation of lungs: Clear to auscultation  Cardiovascular   Auscultation of heart: Normal rate and rhythm, normal S1 and S2, no murmurs  Carotid pulses: 2+ bilaterally  Pedal pulses: 2+ bilaterally  Peripheral vascular exam: Normal     Examination of extremities for edema and/or varicosities: Normal     Abdomen   Abdomen: Non-tender, no masses  Liver and spleen: No hepatomegaly or splenomegaly  Lymphatic   Palpation of lymph nodes in neck: No lymphadenopathy  Musculoskeletal   Gait and station: Normal     Digits and nails: Normal without clubbing or cyanosis  Joints, bones, and muscles: Normal     Range of motion: Normal     Stability: Normal     Muscle strength/tone: Normal     Skin   Skin and subcutaneous tissue: Normal without rashes or lesions  Palpation of skin and subcutaneous tissue: Normal turgor  Neurologic   Cranial nerves: Cranial nerves II-XII intact  Cortical function: Normal mental status      Reflexes: 2+ and symmetric  Sensation: No sensory loss  Coordination: Normal finger to nose and heel to shin  Psychiatric   Judgment and insight: Normal     Orientation to person, place, and time: Normal     Recent and remote memory: Intact  Mood and affect: Normal        Procedure    Procedure: Visual Acuity Test    Indication: routine screening  Inforrmation supplied by a Snellen chart  Results: 20/200 in both eyes without corrective device, 20/200 in the right eye without corrective device, 20/200 in the left eye without corrective device, 20/30 in both eyes with corrective device, 20/30 in the right eye with corrective device, 20/40 in the left eye with corrective device   Color vision was screened with the Ishihara Test and the results were abnormal    The patient tolerated the procedure well and was cooperative  There were no complications  Future Appointments    Date/Time Provider Specialty Site   06/16/2017 01:00 PM JORDANA Mcgregor   Internal Medicine [facility] INTERNAL MED     Signatures   Electronically signed by : JORDANA Wilkerson ; Feb 18 2017  5:39PM EST                       (Author)

## 2018-02-02 ENCOUNTER — TELEPHONE (OUTPATIENT)
Dept: INTERNAL MEDICINE CLINIC | Facility: CLINIC | Age: 83
End: 2018-02-02

## 2018-02-02 DIAGNOSIS — R11.0 NAUSEA: Primary | ICD-10-CM

## 2018-02-02 RX ORDER — ONDANSETRON 4 MG/1
4 TABLET, FILM COATED ORAL EVERY 8 HOURS PRN
Qty: 10 TABLET | Refills: 1 | Status: SHIPPED | OUTPATIENT
Start: 2018-02-02 | End: 2020-05-08 | Stop reason: ALTCHOICE

## 2018-02-02 NOTE — PROGRESS NOTES
Patient call she has been experiencing some GI symptoms over the past 24 hours nausea without vomiting as well as some loose bowel movements/diarrhea  She feels a bit achy and out of sorts  She is not running a temperature elevation has no chest congestion or cough  We have started her on clear liquids for the next 24 hours recommend that she use Imodium right ear 1 tablet after each loose bowel movement up to 6 doses in 24 hours and also order Zofran 4 mg Q 8 hours for nausea symptoms  She will follow up in the office of her symptoms fail to improve

## 2018-02-07 ENCOUNTER — TRANSCRIBE ORDERS (OUTPATIENT)
Dept: RADIOLOGY | Facility: CLINIC | Age: 83
End: 2018-02-07

## 2018-02-12 ENCOUNTER — HOSPITAL ENCOUNTER (OUTPATIENT)
Dept: RADIOLOGY | Age: 83
Discharge: HOME | End: 2018-02-12
Payer: MEDICARE

## 2018-02-12 DIAGNOSIS — Z12.31 ENCOUNTER FOR SCREENING MAMMOGRAM FOR MALIGNANT NEOPLASM OF BREAST: ICD-10-CM

## 2018-02-12 PROCEDURE — 77067 SCR MAMMO BI INCL CAD: CPT

## 2018-02-22 ENCOUNTER — APPOINTMENT (OUTPATIENT)
Dept: LAB | Age: 83
End: 2018-02-22
Payer: MEDICARE

## 2018-02-22 DIAGNOSIS — E78.2 MIXED HYPERLIPIDEMIA: ICD-10-CM

## 2018-02-22 DIAGNOSIS — E55.9 VITAMIN D DEFICIENCY: ICD-10-CM

## 2018-02-22 DIAGNOSIS — I10 ESSENTIAL (PRIMARY) HYPERTENSION: ICD-10-CM

## 2018-02-22 LAB
25(OH)D3 SERPL-MCNC: 30.5 NG/ML (ref 30–100)
ALBUMIN SERPL BCP-MCNC: 3.8 G/DL (ref 3.5–5)
ALP SERPL-CCNC: 90 U/L (ref 46–116)
ALT SERPL W P-5'-P-CCNC: 22 U/L (ref 12–78)
ANION GAP SERPL CALCULATED.3IONS-SCNC: 6 MMOL/L (ref 4–13)
AST SERPL W P-5'-P-CCNC: 17 U/L (ref 5–45)
BILIRUB SERPL-MCNC: 0.54 MG/DL (ref 0.2–1)
BUN SERPL-MCNC: 22 MG/DL (ref 5–25)
CALCIUM SERPL-MCNC: 9.8 MG/DL (ref 8.3–10.1)
CHLORIDE SERPL-SCNC: 106 MMOL/L (ref 100–108)
CHOLEST SERPL-MCNC: 297 MG/DL (ref 50–200)
CO2 SERPL-SCNC: 27 MMOL/L (ref 21–32)
CREAT SERPL-MCNC: 0.75 MG/DL (ref 0.6–1.3)
GFR SERPL CREATININE-BSD FRML MDRD: 76 ML/MIN/1.73SQ M
GLUCOSE P FAST SERPL-MCNC: 93 MG/DL (ref 65–99)
HDLC SERPL-MCNC: 62 MG/DL (ref 40–60)
LDLC SERPL CALC-MCNC: 203 MG/DL (ref 0–100)
POTASSIUM SERPL-SCNC: 4.3 MMOL/L (ref 3.5–5.3)
PROT SERPL-MCNC: 7.6 G/DL (ref 6.4–8.2)
SODIUM SERPL-SCNC: 139 MMOL/L (ref 136–145)
TRIGL SERPL-MCNC: 161 MG/DL

## 2018-02-22 PROCEDURE — 80053 COMPREHEN METABOLIC PANEL: CPT

## 2018-02-22 PROCEDURE — 80061 LIPID PANEL: CPT

## 2018-02-22 PROCEDURE — 36415 COLL VENOUS BLD VENIPUNCTURE: CPT

## 2018-02-22 PROCEDURE — 82306 VITAMIN D 25 HYDROXY: CPT

## 2018-02-23 ENCOUNTER — OFFICE VISIT (OUTPATIENT)
Dept: INTERNAL MEDICINE CLINIC | Facility: CLINIC | Age: 83
End: 2018-02-23
Payer: MEDICARE

## 2018-02-23 VITALS
TEMPERATURE: 98 F | SYSTOLIC BLOOD PRESSURE: 134 MMHG | DIASTOLIC BLOOD PRESSURE: 68 MMHG | RESPIRATION RATE: 16 BRPM | HEART RATE: 72 BPM | OXYGEN SATURATION: 98 %

## 2018-02-23 DIAGNOSIS — I10 ESSENTIAL HYPERTENSION: Primary | ICD-10-CM

## 2018-02-23 DIAGNOSIS — M79.7 FIBROMYALGIA: ICD-10-CM

## 2018-02-23 DIAGNOSIS — F41.9 ANXIETY: ICD-10-CM

## 2018-02-23 PROCEDURE — 99214 OFFICE O/P EST MOD 30 MIN: CPT | Performed by: INTERNAL MEDICINE

## 2018-02-23 RX ORDER — ERGOCALCIFEROL 1.25 MG/1
2000 CAPSULE ORAL DAILY
Refills: 5 | COMMUNITY
Start: 2017-11-19 | End: 2020-05-08 | Stop reason: SDUPTHER

## 2018-02-23 RX ORDER — LISINOPRIL 40 MG/1
1 TABLET ORAL DAILY
COMMUNITY
Start: 2018-01-02 | End: 2018-12-21 | Stop reason: SDUPTHER

## 2018-02-23 RX ORDER — ALPRAZOLAM 0.25 MG/1
0.25 TABLET ORAL 2 TIMES DAILY
Refills: 3 | COMMUNITY
Start: 2018-02-04 | End: 2018-05-07 | Stop reason: SDUPTHER

## 2018-02-23 RX ORDER — LORATADINE 10 MG/1
1 TABLET ORAL DAILY
COMMUNITY

## 2018-02-23 RX ORDER — FAMOTIDINE 20 MG/1
1 TABLET, FILM COATED ORAL DAILY
COMMUNITY
Start: 2018-01-07

## 2018-02-23 RX ORDER — FLUTICASONE PROPIONATE 50 MCG
1 SPRAY, SUSPENSION (ML) NASAL 2 TIMES DAILY
Refills: 6 | COMMUNITY
Start: 2018-02-05 | End: 2018-08-03 | Stop reason: SDUPTHER

## 2018-02-23 RX ORDER — AMLODIPINE BESYLATE 2.5 MG/1
2.5 TABLET ORAL DAILY
COMMUNITY
Start: 2018-01-15 | End: 2018-06-29 | Stop reason: ALTCHOICE

## 2018-02-24 NOTE — ASSESSMENT & PLAN NOTE
Hypertension control at this time is fineContinue continue on present medication no apparent side effects of lisinopril 40 mg daily  Also continue on amlodipine 2 and 0 5 mg daily without any evidence of side effects

## 2018-02-24 NOTE — ASSESSMENT & PLAN NOTE
Anxiety symptoms continue  The patient is gradually coming to a comfort level with her  living in assisted living  She continues use alprazolam 0 25 mg twice a day  No evidence of any abuse of the medication

## 2018-02-24 NOTE — PROGRESS NOTES
Assessment/Plan:    Hypertension  Hypertension control at this time is fineContinue continue on present medication no apparent side effects of lisinopril 40 mg daily  Also continue on amlodipine 2 and 0 5 mg daily without any evidence of side effects  Anxiety  Anxiety symptoms continue  The patient is gradually coming to a comfort level with her  living in assisted living  She continues use alprazolam 0 25 mg twice a day  No evidence of any abuse of the medication  Fibromyalgia  Fibromyalgia symptoms continue to be present intermittently I think they are most likely related to stress in her life will be interesting to see if they decreased now that her  is living in assisted living and she is no longer the primary care for provider for him  Diagnoses and all orders for this visit:    Essential hypertension    Fibromyalgia    Anxiety    Other orders  -     ALPRAZolam (XANAX) 0 25 mg tablet; Take 0 25 mg by mouth 2 (two) times a day  -     amLODIPine (NORVASC) 2 5 mg tablet; Take 2 5 tablets by mouth daily  -     aspirin 81 MG tablet; Take 1 tablet by mouth daily  -     loratadine (CLARITIN) 10 mg tablet; Take 1 tablet by mouth daily  -     ergocalciferol (VITAMIN D2) 50,000 units; Take 2,000 Units by mouth daily   -     famotidine (PEPCID) 20 mg tablet; Take 1 tablet by mouth daily  -     fluticasone (FLONASE) 50 mcg/act nasal spray; 1 spray 2 (two) times a day  -     lisinopril (ZESTRIL) 40 mg tablet; Take 1 tablet by mouth daily        Subjective:      Patient ID: Arabella Germain is a [de-identified] y o  female  This 82-year-old female patient presents today in the company of her   Recently she admitted her  to [address] [place] [address] dementia Unit  She has been visiting him on a regular basis at the facility  He has been a significant transition for both her and her     She did provide excellent care for him at home for many years despite his advancing dementia  Patient continues to have some anxiety but seems to be in general more relieved and relaxed since she has placed her  in the assisted living facility  I suspect she will continue to adjust to this transition and most likely do well  The following portions of the patient's history were reviewed and updated as appropriate:   She  has a past medical history of Epistaxis and Saphenous vein thrombophlebitis, right (2012)  She   Patient Active Problem List    Diagnosis Date Noted    Anxiety 10/09/2014    Asymptomatic varicose veins 10/09/2014    Fibromyalgia 10/09/2014    Hypertension 10/09/2014     She  has a past surgical history that includes Gallbladder surgery (2006)  Her family history includes Hypertension in her mother  She  reports that she has never smoked  She does not have any smokeless tobacco history on file  Her alcohol and drug histories are not on file  Current Outpatient Prescriptions   Medication Sig Dispense Refill    ALPRAZolam (XANAX) 0 25 mg tablet Take 0 25 mg by mouth 2 (two) times a day  3    amLODIPine (NORVASC) 2 5 mg tablet Take 2 5 tablets by mouth daily      aspirin 81 MG tablet Take 1 tablet by mouth daily      ergocalciferol (VITAMIN D2) 50,000 units Take 2,000 Units by mouth daily   5    famotidine (PEPCID) 20 mg tablet Take 1 tablet by mouth daily      fluticasone (FLONASE) 50 mcg/act nasal spray 1 spray 2 (two) times a day  6    lisinopril (ZESTRIL) 40 mg tablet Take 1 tablet by mouth daily      loratadine (CLARITIN) 10 mg tablet Take 1 tablet by mouth daily      ondansetron (ZOFRAN) 4 mg tablet Take 1 tablet (4 mg total) by mouth every 8 (eight) hours as needed for nausea or vomiting 10 tablet 1     No current facility-administered medications for this visit       Review of Systems   All other systems reviewed and are negative          Objective:      /68   Pulse 72   Temp 98 °F (36 7 °C) (Tympanic)   Resp 16   SpO2 98% Physical Exam   Constitutional: She is oriented to person, place, and time  She appears well-developed and well-nourished  HENT:   Right Ear: Tympanic membrane and external ear normal    Left Ear: Tympanic membrane and external ear normal    Nose: Nose normal    Mouth/Throat: Uvula is midline, oropharynx is clear and moist and mucous membranes are normal    Eyes: Conjunctivae are normal  Pupils are equal, round, and reactive to light  Neck: No thyromegaly present  Cardiovascular: Normal rate, regular rhythm, normal heart sounds and intact distal pulses  No murmur heard  Pulmonary/Chest: Effort normal and breath sounds normal  No respiratory distress  She has no wheezes  Abdominal: Soft  Bowel sounds are normal    Musculoskeletal: Normal range of motion  She exhibits no edema  Lymphadenopathy:     She has no cervical adenopathy  Neurological: She is alert and oriented to person, place, and time  She has normal reflexes  Skin: Skin is warm, dry and intact  Psychiatric: She has a normal mood and affect   Her speech is normal and behavior is normal  Judgment and thought content normal

## 2018-02-24 NOTE — ASSESSMENT & PLAN NOTE
Fibromyalgia symptoms continue to be present intermittently I think they are most likely related to stress in her life will be interesting to see if they decreased now that her  is living in assisted living and she is no longer the primary care for provider for him

## 2018-05-07 DIAGNOSIS — F41.9 ANXIETY: Primary | ICD-10-CM

## 2018-05-07 RX ORDER — ALPRAZOLAM 0.25 MG/1
TABLET ORAL
Qty: 60 TABLET | Refills: 1 | Status: SHIPPED | OUTPATIENT
Start: 2018-05-07 | End: 2018-06-29 | Stop reason: SDUPTHER

## 2018-05-31 DIAGNOSIS — I10 ESSENTIAL HYPERTENSION: Primary | ICD-10-CM

## 2018-05-31 RX ORDER — AMLODIPINE BESYLATE 5 MG/1
TABLET ORAL
Qty: 30 TABLET | Refills: 5 | Status: SHIPPED | OUTPATIENT
Start: 2018-05-31 | End: 2018-11-26 | Stop reason: SDUPTHER

## 2018-06-04 ENCOUNTER — TRANSCRIBE ORDERS (OUTPATIENT)
Dept: ADMINISTRATIVE | Age: 83
End: 2018-06-04

## 2018-06-04 ENCOUNTER — APPOINTMENT (OUTPATIENT)
Dept: LAB | Age: 83
End: 2018-06-04
Payer: MEDICARE

## 2018-06-04 DIAGNOSIS — M54.5 ACUTE LOW BACK PAIN, UNSPECIFIED BACK PAIN LATERALITY, WITH SCIATICA PRESENCE UNSPECIFIED: ICD-10-CM

## 2018-06-04 DIAGNOSIS — M54.5 ACUTE LOW BACK PAIN, UNSPECIFIED BACK PAIN LATERALITY, WITH SCIATICA PRESENCE UNSPECIFIED: Primary | ICD-10-CM

## 2018-06-04 LAB
ALBUMIN SERPL BCP-MCNC: 3.5 G/DL (ref 3.5–5)
ALP SERPL-CCNC: 72 U/L (ref 46–116)
ALT SERPL W P-5'-P-CCNC: 22 U/L (ref 12–78)
ANION GAP SERPL CALCULATED.3IONS-SCNC: 8 MMOL/L (ref 4–13)
AST SERPL W P-5'-P-CCNC: 13 U/L (ref 5–45)
BASOPHILS # BLD AUTO: 0.03 THOUSANDS/ΜL (ref 0–0.1)
BASOPHILS NFR BLD AUTO: 0 % (ref 0–1)
BILIRUB SERPL-MCNC: 0.43 MG/DL (ref 0.2–1)
BUN SERPL-MCNC: 26 MG/DL (ref 5–25)
CALCIUM SERPL-MCNC: 9.2 MG/DL (ref 8.3–10.1)
CHLORIDE SERPL-SCNC: 105 MMOL/L (ref 100–108)
CO2 SERPL-SCNC: 26 MMOL/L (ref 21–32)
CREAT SERPL-MCNC: 0.76 MG/DL (ref 0.6–1.3)
CRP SERPL QL: <3 MG/L
EOSINOPHIL # BLD AUTO: 0.23 THOUSAND/ΜL (ref 0–0.61)
EOSINOPHIL NFR BLD AUTO: 3 % (ref 0–6)
ERYTHROCYTE [DISTWIDTH] IN BLOOD BY AUTOMATED COUNT: 12.9 % (ref 11.6–15.1)
ERYTHROCYTE [SEDIMENTATION RATE] IN BLOOD: 16 MM/HOUR (ref 0–20)
GFR SERPL CREATININE-BSD FRML MDRD: 74 ML/MIN/1.73SQ M
GLUCOSE SERPL-MCNC: 81 MG/DL (ref 65–140)
HCT VFR BLD AUTO: 44.1 % (ref 34.8–46.1)
HGB BLD-MCNC: 14 G/DL (ref 11.5–15.4)
IMM GRANULOCYTES # BLD AUTO: 0.02 THOUSAND/UL (ref 0–0.2)
IMM GRANULOCYTES NFR BLD AUTO: 0 % (ref 0–2)
LYMPHOCYTES # BLD AUTO: 2.08 THOUSANDS/ΜL (ref 0.6–4.47)
LYMPHOCYTES NFR BLD AUTO: 28 % (ref 14–44)
MCH RBC QN AUTO: 29.8 PG (ref 26.8–34.3)
MCHC RBC AUTO-ENTMCNC: 31.7 G/DL (ref 31.4–37.4)
MCV RBC AUTO: 94 FL (ref 82–98)
MONOCYTES # BLD AUTO: 0.83 THOUSAND/ΜL (ref 0.17–1.22)
MONOCYTES NFR BLD AUTO: 11 % (ref 4–12)
NEUTROPHILS # BLD AUTO: 4.29 THOUSANDS/ΜL (ref 1.85–7.62)
NEUTS SEG NFR BLD AUTO: 58 % (ref 43–75)
NRBC BLD AUTO-RTO: 0 /100 WBCS
PLATELET # BLD AUTO: 267 THOUSANDS/UL (ref 149–390)
PMV BLD AUTO: 11 FL (ref 8.9–12.7)
POTASSIUM SERPL-SCNC: 3.9 MMOL/L (ref 3.5–5.3)
PROT SERPL-MCNC: 6.8 G/DL (ref 6.4–8.2)
RBC # BLD AUTO: 4.7 MILLION/UL (ref 3.81–5.12)
SODIUM SERPL-SCNC: 139 MMOL/L (ref 136–145)
WBC # BLD AUTO: 7.48 THOUSAND/UL (ref 4.31–10.16)

## 2018-06-04 PROCEDURE — 86140 C-REACTIVE PROTEIN: CPT

## 2018-06-04 PROCEDURE — 80053 COMPREHEN METABOLIC PANEL: CPT

## 2018-06-04 PROCEDURE — 86430 RHEUMATOID FACTOR TEST QUAL: CPT

## 2018-06-04 PROCEDURE — 86617 LYME DISEASE ANTIBODY: CPT

## 2018-06-04 PROCEDURE — 86038 ANTINUCLEAR ANTIBODIES: CPT

## 2018-06-04 PROCEDURE — 86200 CCP ANTIBODY: CPT

## 2018-06-04 PROCEDURE — 85652 RBC SED RATE AUTOMATED: CPT

## 2018-06-04 PROCEDURE — 36415 COLL VENOUS BLD VENIPUNCTURE: CPT

## 2018-06-04 PROCEDURE — 85025 COMPLETE CBC W/AUTO DIFF WBC: CPT

## 2018-06-05 ENCOUNTER — APPOINTMENT (OUTPATIENT)
Dept: LAB | Age: 83
End: 2018-06-05
Payer: MEDICARE

## 2018-06-05 LAB
BACTERIA UR QL AUTO: ABNORMAL /HPF
BILIRUB UR QL STRIP: NEGATIVE
CLARITY UR: ABNORMAL
COLOR UR: YELLOW
GLUCOSE UR STRIP-MCNC: NEGATIVE MG/DL
HGB UR QL STRIP.AUTO: NEGATIVE
HYALINE CASTS #/AREA URNS LPF: ABNORMAL /LPF
KETONES UR STRIP-MCNC: NEGATIVE MG/DL
LEUKOCYTE ESTERASE UR QL STRIP: ABNORMAL
NITRITE UR QL STRIP: POSITIVE
NON-SQ EPI CELLS URNS QL MICRO: ABNORMAL /HPF
PH UR STRIP.AUTO: 6 [PH] (ref 4.5–8)
PROT UR STRIP-MCNC: NEGATIVE MG/DL
RBC #/AREA URNS AUTO: ABNORMAL /HPF
RHEUMATOID FACT SER QL LA: NEGATIVE
SP GR UR STRIP.AUTO: 1.01 (ref 1–1.03)
UROBILINOGEN UR QL STRIP.AUTO: 0.2 E.U./DL
WBC #/AREA URNS AUTO: ABNORMAL /HPF

## 2018-06-05 PROCEDURE — 81001 URINALYSIS AUTO W/SCOPE: CPT | Performed by: PHYSICIAN ASSISTANT

## 2018-06-06 LAB

## 2018-06-29 ENCOUNTER — OFFICE VISIT (OUTPATIENT)
Dept: INTERNAL MEDICINE CLINIC | Facility: CLINIC | Age: 83
End: 2018-06-29
Payer: MEDICARE

## 2018-06-29 VITALS
HEART RATE: 80 BPM | RESPIRATION RATE: 16 BRPM | BODY MASS INDEX: 28.75 KG/M2 | OXYGEN SATURATION: 98 % | DIASTOLIC BLOOD PRESSURE: 68 MMHG | TEMPERATURE: 97.7 F | SYSTOLIC BLOOD PRESSURE: 124 MMHG | WEIGHT: 168.4 LBS | HEIGHT: 64 IN

## 2018-06-29 DIAGNOSIS — F41.9 ANXIETY: ICD-10-CM

## 2018-06-29 DIAGNOSIS — M79.7 FIBROMYALGIA: ICD-10-CM

## 2018-06-29 DIAGNOSIS — G47.9 SLEEP DISTURBANCE: ICD-10-CM

## 2018-06-29 DIAGNOSIS — E78.5 HYPERLIPIDEMIA, UNSPECIFIED HYPERLIPIDEMIA TYPE: Primary | ICD-10-CM

## 2018-06-29 DIAGNOSIS — I10 ESSENTIAL HYPERTENSION: ICD-10-CM

## 2018-06-29 PROCEDURE — 99214 OFFICE O/P EST MOD 30 MIN: CPT | Performed by: INTERNAL MEDICINE

## 2018-06-29 RX ORDER — CONJUGATED ESTROGENS 0.62 MG/G
CREAM VAGINAL
Refills: 2 | COMMUNITY
Start: 2018-04-06

## 2018-06-29 RX ORDER — ALPRAZOLAM 0.25 MG/1
0.25 TABLET ORAL 2 TIMES DAILY
Qty: 60 TABLET | Refills: 4 | Status: SHIPPED | OUTPATIENT
Start: 2018-06-29 | End: 2018-12-03 | Stop reason: SDUPTHER

## 2018-06-29 RX ORDER — TAFLUPROST 0 MG/.3ML
SOLUTION/ DROPS OPHTHALMIC
Refills: 6 | COMMUNITY
Start: 2018-06-10

## 2018-06-29 RX ORDER — TIMOLOL MALEATE 5 MG/ML
SOLUTION/ DROPS OPHTHALMIC
Refills: 2 | COMMUNITY
Start: 2018-06-18

## 2018-06-29 NOTE — ASSESSMENT & PLAN NOTE
Persistent anxiety symptoms secondary to family issues with her  living in a nursing home with advanced Alzheimer's disease  Reviewed with her the proper use of alprazolam today no indication of dependence or abuse of the medication  She can take 0 25 milligrams twice a day  Second dose should fall close to bedtime to help her sleep at night if she does not fall asleep within 45 minutes to an hour she can take a 2nd dose of 0 25 milligrams to help her fall asleep

## 2018-06-29 NOTE — ASSESSMENT & PLAN NOTE
Sleep disturbance please see dictation under plan for anxiety as well as HPI which outline are medication recommendation to treat and address her sleep disturbance

## 2018-06-29 NOTE — ASSESSMENT & PLAN NOTE
History of hypertension blood pressure is well controlled at the present time continue present blood pressure medications with follow-up assessment requested for 4 months

## 2018-06-29 NOTE — PROGRESS NOTES
Assessment/Plan:    Hypertension  History of hypertension blood pressure is well controlled at the present time continue present blood pressure medications with follow-up assessment requested for 4 months  Anxiety  Persistent anxiety symptoms secondary to family issues with her  living in a nursing home with advanced Alzheimer's disease  Reviewed with her the proper use of alprazolam today no indication of dependence or abuse of the medication  She can take 0 25 milligrams twice a day  Second dose should fall close to bedtime to help her sleep at night if she does not fall asleep within 45 minutes to an hour she can take a 2nd dose of 0 25 milligrams to help her fall asleep  Fibromyalgia  Increase in fibromyalgia symptoms likely due to stress level she is functioning under recently  Patient reassured symptoms do not represent anything more than muscular and skeletal type of symptoms  Sleep disturbance  Sleep disturbance please see dictation under plan for anxiety as well as HPI which outline are medication recommendation to treat and address her sleep disturbance  Diagnoses and all orders for this visit:    Hyperlipidemia, unspecified hyperlipidemia type  -     Lipid panel; Future  -     Comprehensive metabolic panel; Future    Anxiety  -     ALPRAZolam (XANAX) 0 25 mg tablet; Take 1 tablet (0 25 mg total) by mouth 2 (two) times a day    Fibromyalgia    Essential hypertension    Other orders  -     RESTASIS MULTIDOSE 0 05 % ophthalmic emulsion;   -     PREMARIN vaginal cream; APPLY 1/4 INCH WITH FINGER TO VAGINA MONDAY AND FRIDAY AT BEDTIME  -     ZIOPTAN 0 0015 % ophthalmic solution; ADMINISTER 1 DROP OPHTHALMICALLY AT BEDTIME IN THE RIGHT EYE, BRAND NECESSARY  -     timolol (TIMOPTIC) 0 5 % ophthalmic solution; INSTILL 1 DROP INTO RIGHT EYE EVERY DAY        Subjective:      Patient ID: Liliana Cortez is a 82 y o  female      This 30-year-old female patient presents today for a routine 4 month visit  She continues to have high levels of stress as her  is presently a patient in a skilled nursing facility  She discussed with me set today some of the issues she is in countered over the past several weeks with his care  She continues to have difficulty sleeping at night sometimes having a hard time falling asleep or falling asleep and then waking up in can return to sleep  I discussed the alprazolam that she takes indicated that if she takes a dose at bedtime and has not fallen asleep within 45 minutes to an hour she can take a 2nd dose of 0 25 milligrams  She has no signs of dependence or abuse of this medication  Patient also has symptoms of fibromyalgia in her chest area and the pectoralis as well as trapezius muscles  I suspect this is increased recently due to her stress level  The following portions of the patient's history were reviewed and updated as appropriate: She  has a past medical history of Epistaxis and Saphenous vein thrombophlebitis, right (2012)  She  has a past surgical history that includes Gallbladder surgery (2006)  Her family history includes Hypertension in her mother  She  reports that she has never smoked  She does not have any smokeless tobacco history on file  Her alcohol and drug histories are not on file       Review of Systems   Constitutional: Positive for fatigue  Musculoskeletal: Positive for arthralgias and myalgias  Psychiatric/Behavioral: Positive for sleep disturbance  The patient is nervous/anxious  All other systems reviewed and are negative  Objective:      /68 (BP Location: Left arm, Patient Position: Sitting, Cuff Size: Standard)   Pulse 80   Temp 97 7 °F (36 5 °C)   Resp 16   Ht 5' 4" (1 626 m)   Wt 76 4 kg (168 lb 6 4 oz)   SpO2 98%   BMI 28 91 kg/m²          Physical Exam   Constitutional: She is oriented to person, place, and time  Vital signs are normal  She appears well-developed and well-nourished  She is cooperative  HENT:   Right Ear: Hearing, tympanic membrane, external ear and ear canal normal    Left Ear: Hearing, tympanic membrane, external ear and ear canal normal    Nose: Nose normal  No mucosal edema  Mouth/Throat: Uvula is midline, oropharynx is clear and moist and mucous membranes are normal    Eyes: Conjunctivae and lids are normal  Pupils are equal, round, and reactive to light  Neck: No JVD present  Carotid bruit is not present  No thyromegaly present  Cardiovascular: Normal rate, regular rhythm, normal heart sounds and intact distal pulses  No murmur heard  Pulmonary/Chest: Effort normal and breath sounds normal  No respiratory distress  She has no wheezes  She has no rales  She exhibits no tenderness  Abdominal: Soft  Normal appearance and bowel sounds are normal    Musculoskeletal: Normal range of motion  She exhibits no edema  Lymphadenopathy:     She has no cervical adenopathy  Neurological: She is alert and oriented to person, place, and time  She has normal reflexes  Skin: Skin is warm, dry and intact  Psychiatric: She has a normal mood and affect  Her speech is normal and behavior is normal  Judgment and thought content normal  Cognition and memory are normal    Vitals reviewed

## 2018-06-29 NOTE — ASSESSMENT & PLAN NOTE
Increase in fibromyalgia symptoms likely due to stress level she is functioning under recently  Patient reassured symptoms do not represent anything more than muscular and skeletal type of symptoms

## 2018-07-20 ENCOUNTER — TELEPHONE (OUTPATIENT)
Dept: INTERNAL MEDICINE CLINIC | Facility: CLINIC | Age: 83
End: 2018-07-20

## 2018-07-20 NOTE — TELEPHONE ENCOUNTER
Pt had bumped big toe on left foot last week, it bled but was fine  She bumped it again recently and now feels loose  She asked for a call back with any instruction/guidance at [phone]

## 2018-08-03 DIAGNOSIS — T78.40XD ALLERGIC STATE, SUBSEQUENT ENCOUNTER: Primary | ICD-10-CM

## 2018-08-03 RX ORDER — FLUTICASONE PROPIONATE 50 MCG
SPRAY, SUSPENSION (ML) NASAL
Qty: 16 G | Refills: 6 | Status: SHIPPED | OUTPATIENT
Start: 2018-08-03 | End: 2018-12-26 | Stop reason: SDUPTHER

## 2018-09-04 ENCOUNTER — TELEPHONE (OUTPATIENT)
Dept: INTERNAL MEDICINE CLINIC | Facility: CLINIC | Age: 83
End: 2018-09-04

## 2018-09-04 NOTE — TELEPHONE ENCOUNTER
The patient called she was recently prescribed a z-anupam, she would like to know if she could also take robitussin for cough or ibuprofen for bodyaches

## 2018-09-25 ENCOUNTER — TELEPHONE (OUTPATIENT)
Dept: INTERNAL MEDICINE CLINIC | Facility: CLINIC | Age: 83
End: 2018-09-25

## 2018-09-25 NOTE — TELEPHONE ENCOUNTER
Patient is having a cough non-productive and is taking Robitussin for it but she is having a very dry, raspy bothersome sore throat is there anything she can take for it besides her using cough drops and gargling, she just wants to know if there is anything else she can do or take for this

## 2018-09-26 ENCOUNTER — OFFICE VISIT (OUTPATIENT)
Dept: INTERNAL MEDICINE CLINIC | Facility: CLINIC | Age: 83
End: 2018-09-26
Payer: MEDICARE

## 2018-09-26 VITALS
DIASTOLIC BLOOD PRESSURE: 60 MMHG | SYSTOLIC BLOOD PRESSURE: 100 MMHG | HEIGHT: 64 IN | HEART RATE: 88 BPM | TEMPERATURE: 97.8 F | OXYGEN SATURATION: 98 % | BODY MASS INDEX: 27.31 KG/M2 | WEIGHT: 160 LBS

## 2018-09-26 DIAGNOSIS — J06.9 URTI (ACUTE UPPER RESPIRATORY INFECTION): Primary | ICD-10-CM

## 2018-09-26 PROCEDURE — 99213 OFFICE O/P EST LOW 20 MIN: CPT | Performed by: INTERNAL MEDICINE

## 2018-09-26 RX ORDER — AZITHROMYCIN 250 MG/1
TABLET, FILM COATED ORAL
Qty: 6 TABLET | Refills: 0 | Status: SHIPPED | OUTPATIENT
Start: 2018-09-26 | End: 2018-09-30

## 2018-09-26 NOTE — ASSESSMENT & PLAN NOTE
Upper respiratory tract infection in a patient that frequently visits her  in a skilled nursing facility  I indicated the patient is difficult to tell if this is viral versus bacterial at this time I it would be advantageous to treat her with a short course of Zithromax in case there is a bacterial component  If it is viral I suspect she will do fine needle run his course and she will gradually improve  Will give her 5 day course of Zithromax advise her to continue with her over-the-counter cough medication maintain good hydration as well as good nutrition  Extra rest is encouraged over the next several days and if her symptoms worsen or fail to resolve with treatment she is encouraged to return for follow-up visit

## 2018-09-26 NOTE — PROGRESS NOTES
Assessment/Plan:    URTI (acute upper respiratory infection)  Upper respiratory tract infection in a patient that frequently visits her  in a skilled nursing facility  I indicated the patient is difficult to tell if this is viral versus bacterial at this time I it would be advantageous to treat her with a short course of Zithromax in case there is a bacterial component  If it is viral I suspect she will do fine needle run his course and she will gradually improve  Will give her 5 day course of Zithromax advise her to continue with her over-the-counter cough medication maintain good hydration as well as good nutrition  Extra rest is encouraged over the next several days and if her symptoms worsen or fail to resolve with treatment she is encouraged to return for follow-up visit  Diagnoses and all orders for this visit:    URTI (acute upper respiratory infection)  -     azithromycin (ZITHROMAX) 250 mg tablet; Take 2 tablets today then 1 tablet daily x 4 days        Subjective:      Patient ID: Chandni Roberts is a 82 y o  female  This is a acute visit for this 31-year-old female patient  She presents today with a hoarse throat occasional cough which has been present for several days she has been using over-the-counter cough medication but is concerned about the persistence of her symptoms  She has no fever or chills no arthralgias or myalgias  Sore Throat    Associated symptoms include coughing  Cough   Associated symptoms include a sore throat  The following portions of the patient's history were reviewed and updated as appropriate:   She  has a past medical history of Epistaxis and Saphenous vein thrombophlebitis, right (2012)    She   Patient Active Problem List    Diagnosis Date Noted    URTI (acute upper respiratory infection) 09/26/2018    Sleep disturbance 06/29/2018    Anxiety 10/09/2014    Asymptomatic varicose veins 10/09/2014    Fibromyalgia 10/09/2014    Hypertension 10/09/2014     She  has a past surgical history that includes Gallbladder surgery (2006)  Her family history includes Hypertension in her mother  She  reports that she has never smoked  She has never used smokeless tobacco  Her alcohol and drug histories are not on file  Current Outpatient Prescriptions   Medication Sig Dispense Refill    ALPRAZolam (XANAX) 0 25 mg tablet Take 1 tablet (0 25 mg total) by mouth 2 (two) times a day 60 tablet 4    amLODIPine (NORVASC) 5 mg tablet TAKE 1 TABLET BY MOUTH EVERY DAY 30 tablet 5    aspirin 81 MG tablet Take 1 tablet by mouth daily      ergocalciferol (VITAMIN D2) 50,000 units Take 2,000 Units by mouth daily   5    famotidine (PEPCID) 20 mg tablet Take 1 tablet by mouth daily      fluticasone (FLONASE) 50 mcg/act nasal spray USE 1 SPRAY IN EACH NOSTRIL TWICE DAILY 16 g 6    lisinopril (ZESTRIL) 40 mg tablet Take 1 tablet by mouth daily      loratadine (CLARITIN) 10 mg tablet Take 1 tablet by mouth daily      ondansetron (ZOFRAN) 4 mg tablet Take 1 tablet (4 mg total) by mouth every 8 (eight) hours as needed for nausea or vomiting 10 tablet 1    PREMARIN vaginal cream APPLY 1/4 INCH WITH FINGER TO VAGINA MONDAY AND FRIDAY AT BEDTIME  2    RESTASIS MULTIDOSE 0 05 % ophthalmic emulsion       timolol (TIMOPTIC) 0 5 % ophthalmic solution INSTILL 1 DROP INTO RIGHT EYE EVERY DAY  2    ZIOPTAN 0 0015 % ophthalmic solution ADMINISTER 1 DROP OPHTHALMICALLY AT BEDTIME IN THE RIGHT EYE, BRAND NECESSARY  6    azithromycin (ZITHROMAX) 250 mg tablet Take 2 tablets today then 1 tablet daily x 4 days 6 tablet 0     No current facility-administered medications for this visit       Review of Systems   HENT: Positive for sore throat  Respiratory: Positive for cough  All other systems reviewed and are negative          Objective:      /60 (BP Location: Left arm, Patient Position: Sitting, Cuff Size: Adult)   Pulse 88   Temp 97 8 °F (36 6 °C) (Tympanic)   Ht 5' 4" (1 626 m)   Wt 72 6 kg (160 lb)   SpO2 98%   BMI 27 46 kg/m²          Physical Exam   Constitutional: She is oriented to person, place, and time  Vital signs are normal  She appears well-developed and well-nourished  She is cooperative  HENT:   Head: Normocephalic and atraumatic  Right Ear: Hearing, tympanic membrane, external ear and ear canal normal    Left Ear: Hearing, tympanic membrane, external ear and ear canal normal    Nose: Nose normal  No mucosal edema  Mouth/Throat: Uvula is midline, oropharynx is clear and moist and mucous membranes are normal    Eyes: Conjunctivae and lids are normal  Pupils are equal, round, and reactive to light  Neck: No JVD present  Carotid bruit is not present  No thyromegaly present  Cardiovascular: Normal rate, regular rhythm, normal heart sounds and intact distal pulses  No murmur heard  Pulmonary/Chest: Effort normal and breath sounds normal  No respiratory distress  She has no wheezes  She has no rales  She exhibits no tenderness  Abdominal: Soft  Normal appearance and bowel sounds are normal    Musculoskeletal: Normal range of motion  She exhibits no edema  Lymphadenopathy:     She has no cervical adenopathy  Neurological: She is alert and oriented to person, place, and time  She has normal reflexes  Skin: Skin is warm, dry and intact  Psychiatric: She has a normal mood and affect  Her speech is normal and behavior is normal  Judgment and thought content normal  Cognition and memory are normal    Vitals reviewed

## 2018-10-17 ENCOUNTER — APPOINTMENT (OUTPATIENT)
Dept: LAB | Age: 83
End: 2018-10-17
Payer: MEDICARE

## 2018-10-17 DIAGNOSIS — E78.5 HYPERLIPIDEMIA, UNSPECIFIED HYPERLIPIDEMIA TYPE: ICD-10-CM

## 2018-10-17 LAB
ALBUMIN SERPL BCP-MCNC: 3.6 G/DL (ref 3.5–5)
ALP SERPL-CCNC: 74 U/L (ref 46–116)
ALT SERPL W P-5'-P-CCNC: 20 U/L (ref 12–78)
ANION GAP SERPL CALCULATED.3IONS-SCNC: 5 MMOL/L (ref 4–13)
AST SERPL W P-5'-P-CCNC: 13 U/L (ref 5–45)
BILIRUB SERPL-MCNC: 0.53 MG/DL (ref 0.2–1)
BUN SERPL-MCNC: 26 MG/DL (ref 5–25)
CALCIUM SERPL-MCNC: 9.3 MG/DL (ref 8.3–10.1)
CHLORIDE SERPL-SCNC: 106 MMOL/L (ref 100–108)
CHOLEST SERPL-MCNC: 253 MG/DL (ref 50–200)
CO2 SERPL-SCNC: 26 MMOL/L (ref 21–32)
CREAT SERPL-MCNC: 0.79 MG/DL (ref 0.6–1.3)
GFR SERPL CREATININE-BSD FRML MDRD: 70 ML/MIN/1.73SQ M
GLUCOSE P FAST SERPL-MCNC: 100 MG/DL (ref 65–99)
HDLC SERPL-MCNC: 57 MG/DL (ref 40–60)
LDLC SERPL CALC-MCNC: 178 MG/DL (ref 0–100)
NONHDLC SERPL-MCNC: 196 MG/DL
POTASSIUM SERPL-SCNC: 4.4 MMOL/L (ref 3.5–5.3)
PROT SERPL-MCNC: 7 G/DL (ref 6.4–8.2)
SODIUM SERPL-SCNC: 137 MMOL/L (ref 136–145)
TRIGL SERPL-MCNC: 91 MG/DL

## 2018-10-17 PROCEDURE — 80061 LIPID PANEL: CPT

## 2018-10-17 PROCEDURE — 80053 COMPREHEN METABOLIC PANEL: CPT

## 2018-10-17 PROCEDURE — 36415 COLL VENOUS BLD VENIPUNCTURE: CPT

## 2018-10-29 ENCOUNTER — TELEPHONE (OUTPATIENT)
Dept: INTERNAL MEDICINE CLINIC | Facility: CLINIC | Age: 83
End: 2018-10-29

## 2018-10-29 NOTE — TELEPHONE ENCOUNTER
Call returned to the patient she is not available at this time essential left on her answering machine

## 2018-10-29 NOTE — TELEPHONE ENCOUNTER
Patient feels bloated and gassy  She is using Pepcid and align but is wondering if gas-x would be okay to take also or if there is some other OTC she can take  Can call patient back with your recommendation

## 2018-10-31 ENCOUNTER — OFFICE VISIT (OUTPATIENT)
Dept: INTERNAL MEDICINE CLINIC | Facility: CLINIC | Age: 83
End: 2018-10-31
Payer: MEDICARE

## 2018-10-31 VITALS
BODY MASS INDEX: 27.18 KG/M2 | OXYGEN SATURATION: 93 % | RESPIRATION RATE: 14 BRPM | HEART RATE: 100 BPM | TEMPERATURE: 96.9 F | DIASTOLIC BLOOD PRESSURE: 60 MMHG | HEIGHT: 64 IN | SYSTOLIC BLOOD PRESSURE: 114 MMHG | WEIGHT: 159.2 LBS

## 2018-10-31 DIAGNOSIS — E78.5 HYPERLIPIDEMIA, UNSPECIFIED HYPERLIPIDEMIA TYPE: ICD-10-CM

## 2018-10-31 DIAGNOSIS — I10 ESSENTIAL HYPERTENSION: ICD-10-CM

## 2018-10-31 DIAGNOSIS — Z23 NEED FOR INFLUENZA VACCINATION: Primary | ICD-10-CM

## 2018-10-31 DIAGNOSIS — E74.39 GLUCOSE INTOLERANCE: ICD-10-CM

## 2018-10-31 DIAGNOSIS — M79.7 FIBROMYALGIA: ICD-10-CM

## 2018-10-31 DIAGNOSIS — R14.3 FLATULENCE: ICD-10-CM

## 2018-10-31 DIAGNOSIS — I83.90 ASYMPTOMATIC VARICOSE VEINS: ICD-10-CM

## 2018-10-31 PROBLEM — J06.9 URTI (ACUTE UPPER RESPIRATORY INFECTION): Status: RESOLVED | Noted: 2018-09-26 | Resolved: 2018-10-31

## 2018-10-31 PROCEDURE — 90662 IIV NO PRSV INCREASED AG IM: CPT | Performed by: INTERNAL MEDICINE

## 2018-10-31 PROCEDURE — G0008 ADMIN INFLUENZA VIRUS VAC: HCPCS | Performed by: INTERNAL MEDICINE

## 2018-10-31 PROCEDURE — 99215 OFFICE O/P EST HI 40 MIN: CPT | Performed by: INTERNAL MEDICINE

## 2018-10-31 NOTE — PROGRESS NOTES
Assessment/Plan:    Asymptomatic varicose veins  Asymptomatic varicose veins of the lower extremities  There is no evidence of any superficial thrombophlebitis at this time  I have advised the patient to initiate the use of compression stockings when she is going to be on her feet for any extended period of time she needs a stockings to be knee-high  Hypertension  With a history of hypertension the patient's blood pressure is noted today to be 114/60 she has good control of her blood pressure and should continue on lisinopril 40 mg daily along with amlodipine 5 mg daily  A follow-up assessment of her blood pressure should be made in 4 months  Fibromyalgia  History of fibromyalgia which seems to be triggered by stressful situations  Recommend regular exercise and stretching exercise  Flatulence  Intestinal flatulence episodes wreck amend the use of simethicone with each meal and at bedtime  Glucose intolerance  Mild glucose intolerance is noted fasting blood sugar on most recent blood work is noted to be 100  Diet reviewed with the patient to reduce carbohydrate consumption and also decreased calories to assist in weight loss  The patient has been successful at losing weight and is approximately 9 lb lighter than she was 4 months ago  Follow-up blood sugar testing should be performed in 4 months  Hyperlipidemia  The lipid profile for this patient was reviewed with her today  Benefit of dietary adjustments to reduce concentrated fats in the diet were reviewed with the patient continued efforts to reduce weight her also encouraged  Follow-up testing should be performed in 4 months  Diagnoses and all orders for this visit:    Need for influenza vaccination  -     influenza vaccine, [id number], high-dose, PF 0 5 mL, for patients 65 yr+ (FLUZONE HIGH-DOSE)    Hyperlipidemia, unspecified hyperlipidemia type  -     Lipid panel;  Future    Glucose intolerance  -     Comprehensive metabolic panel; Future    Asymptomatic varicose veins    Essential hypertension    Fibromyalgia    Flatulence        Subjective:      Patient ID: Sim Austin is a 82 y o  female  This is a routine follow-up visit for this 24-year-old female patient  She is experiencing elevated levels of stress due the fact that her  is presently in the hospital with a urinary tract infection  He has been living in a cognitive assistance facility for approximately 1 year due to advancing dementia symptoms  The patient has been experiencing increasing abdominal bloating and flatulence for some time  She occasionally experiences diarrhea but there is no blood or undigested foods noted in the loose stools  After using Imodium to combat the loose stool she frequently becomes constipated for several days  During that constipation phase she has experienced increased flatulence and gas  The patient's fibromyalgia flares periodically at frequently associated with increasing stress levels  The following portions of the patient's history were reviewed and updated as appropriate:   She  has a past medical history of Epistaxis and Saphenous vein thrombophlebitis, right (2012)  She   Patient Active Problem List    Diagnosis Date Noted    Hyperlipidemia 10/31/2018    Glucose intolerance 10/31/2018    Flatulence 10/31/2018    Sleep disturbance 06/29/2018    Anxiety 10/09/2014    Asymptomatic varicose veins 10/09/2014    Fibromyalgia 10/09/2014    Hypertension 10/09/2014     She  has a past surgical history that includes Gallbladder surgery (2006)  Her family history includes Hypertension in her mother  She  reports that she has never smoked  She has never used smokeless tobacco  Her alcohol and drug histories are not on file    Current Outpatient Prescriptions   Medication Sig Dispense Refill    ALPRAZolam (XANAX) 0 25 mg tablet Take 1 tablet (0 25 mg total) by mouth 2 (two) times a day 60 tablet 4    amLODIPine (NORVASC) 5 mg tablet TAKE 1 TABLET BY MOUTH EVERY DAY 30 tablet 5    aspirin 81 MG tablet Take 1 tablet by mouth daily      ergocalciferol (VITAMIN D2) 50,000 units Take 2,000 Units by mouth daily   5    famotidine (PEPCID) 20 mg tablet Take 1 tablet by mouth daily      fluticasone (FLONASE) 50 mcg/act nasal spray USE 1 SPRAY IN EACH NOSTRIL TWICE DAILY 16 g 6    lisinopril (ZESTRIL) 40 mg tablet Take 1 tablet by mouth daily      loratadine (CLARITIN) 10 mg tablet Take 1 tablet by mouth daily      ondansetron (ZOFRAN) 4 mg tablet Take 1 tablet (4 mg total) by mouth every 8 (eight) hours as needed for nausea or vomiting 10 tablet 1    PREMARIN vaginal cream APPLY 1/4 INCH WITH FINGER TO VAGINA MONDAY AND FRIDAY AT BEDTIME  2    RESTASIS MULTIDOSE 0 05 % ophthalmic emulsion       timolol (TIMOPTIC) 0 5 % ophthalmic solution INSTILL 1 DROP INTO RIGHT EYE EVERY DAY  2    ZIOPTAN 0 0015 % ophthalmic solution ADMINISTER 1 DROP OPHTHALMICALLY AT BEDTIME IN THE RIGHT EYE, BRAND NECESSARY  6     No current facility-administered medications for this visit       Review of Systems   Constitutional: Positive for fatigue  Gastrointestinal: Positive for constipation and diarrhea  Intestinal flatulence   Musculoskeletal: Positive for arthralgias and myalgias  All other systems reviewed and are negative  Objective:      /60   Pulse 100   Temp (!) 96 9 °F (36 1 °C)   Resp 14   Ht 5' 4" (1 626 m)   Wt 72 2 kg (159 lb 3 2 oz)   SpO2 93%   BMI 27 33 kg/m²          Physical Exam   Constitutional: She is oriented to person, place, and time  Vital signs are normal  She appears well-developed and well-nourished  She is cooperative  No distress  HENT:   Head: Normocephalic and atraumatic  Right Ear: Hearing, tympanic membrane, external ear and ear canal normal    Left Ear: Hearing, tympanic membrane, external ear and ear canal normal    Nose: Nose normal  No mucosal edema     Mouth/Throat: Uvula is midline, oropharynx is clear and moist and mucous membranes are normal    Eyes: Pupils are equal, round, and reactive to light  Conjunctivae and lids are normal    Neck: No JVD present  Carotid bruit is not present  No thyromegaly present  Cardiovascular: Normal rate, regular rhythm, normal heart sounds and intact distal pulses  No murmur heard  Pulmonary/Chest: Effort normal and breath sounds normal  No respiratory distress  She has no wheezes  She has no rales  She exhibits no tenderness  Abdominal: Soft  Normal appearance and bowel sounds are normal    Musculoskeletal: Normal range of motion  She exhibits no edema  Lymphadenopathy:     She has no cervical adenopathy  Neurological: She is alert and oriented to person, place, and time  She has normal reflexes  Skin: Skin is warm, dry and intact  No rash noted  She is not diaphoretic  No erythema  No pallor  Psychiatric: She has a normal mood and affect  Her speech is normal and behavior is normal  Judgment and thought content normal  Cognition and memory are normal    Vitals reviewed

## 2018-10-31 NOTE — ASSESSMENT & PLAN NOTE
Mild glucose intolerance is noted fasting blood sugar on most recent blood work is noted to be 100  Diet reviewed with the patient to reduce carbohydrate consumption and also decreased calories to assist in weight loss  The patient has been successful at losing weight and is approximately 9 lb lighter than she was 4 months ago  Follow-up blood sugar testing should be performed in 4 months

## 2018-10-31 NOTE — ASSESSMENT & PLAN NOTE
Asymptomatic varicose veins of the lower extremities  There is no evidence of any superficial thrombophlebitis at this time  I have advised the patient to initiate the use of compression stockings when she is going to be on her feet for any extended period of time she needs a stockings to be knee-high

## 2018-10-31 NOTE — ASSESSMENT & PLAN NOTE
The lipid profile for this patient was reviewed with her today  Benefit of dietary adjustments to reduce concentrated fats in the diet were reviewed with the patient continued efforts to reduce weight her also encouraged  Follow-up testing should be performed in 4 months

## 2018-10-31 NOTE — ASSESSMENT & PLAN NOTE
With a history of hypertension the patient's blood pressure is noted today to be 114/60 she has good control of her blood pressure and should continue on lisinopril 40 mg daily along with amlodipine 5 mg daily  A follow-up assessment of her blood pressure should be made in 4 months

## 2018-10-31 NOTE — ASSESSMENT & PLAN NOTE
History of fibromyalgia which seems to be triggered by stressful situations  Recommend regular exercise and stretching exercise

## 2018-11-26 DIAGNOSIS — I10 ESSENTIAL HYPERTENSION: ICD-10-CM

## 2018-11-26 RX ORDER — AMLODIPINE BESYLATE 5 MG/1
TABLET ORAL
Qty: 30 TABLET | Refills: 5 | Status: SHIPPED | OUTPATIENT
Start: 2018-11-26 | End: 2019-05-20 | Stop reason: SDUPTHER

## 2018-12-03 DIAGNOSIS — F41.9 ANXIETY: ICD-10-CM

## 2018-12-03 RX ORDER — ALPRAZOLAM 0.25 MG/1
TABLET ORAL
Qty: 60 TABLET | Refills: 2 | Status: SHIPPED | OUTPATIENT
Start: 2018-12-03 | End: 2019-03-02 | Stop reason: SDUPTHER

## 2018-12-21 DIAGNOSIS — I10 ESSENTIAL HYPERTENSION: Primary | ICD-10-CM

## 2018-12-21 RX ORDER — LISINOPRIL 40 MG/1
TABLET ORAL
Qty: 90 TABLET | Refills: 3 | Status: SHIPPED | OUTPATIENT
Start: 2018-12-21 | End: 2019-12-11 | Stop reason: SDUPTHER

## 2018-12-26 DIAGNOSIS — T78.40XD ALLERGIC STATE, SUBSEQUENT ENCOUNTER: ICD-10-CM

## 2018-12-26 DIAGNOSIS — I10 ESSENTIAL HYPERTENSION: ICD-10-CM

## 2018-12-27 RX ORDER — FLUTICASONE PROPIONATE 50 MCG
1 SPRAY, SUSPENSION (ML) NASAL 2 TIMES DAILY
Qty: 3 BOTTLE | Refills: 3 | Status: SHIPPED | OUTPATIENT
Start: 2018-12-27 | End: 2019-11-24 | Stop reason: SDUPTHER

## 2019-02-14 ENCOUNTER — HOSPITAL ENCOUNTER (OUTPATIENT)
Dept: RADIOLOGY | Age: 84
Discharge: HOME | End: 2019-02-14
Payer: MEDICARE

## 2019-02-14 VITALS — WEIGHT: 159 LBS | BODY MASS INDEX: 27.14 KG/M2 | HEIGHT: 64 IN

## 2019-02-14 DIAGNOSIS — Z12.31 ENCOUNTER FOR SCREENING MAMMOGRAM FOR MALIGNANT NEOPLASM OF BREAST: ICD-10-CM

## 2019-02-14 PROCEDURE — 77067 SCR MAMMO BI INCL CAD: CPT

## 2019-02-28 ENCOUNTER — OFFICE VISIT (OUTPATIENT)
Dept: INTERNAL MEDICINE CLINIC | Facility: CLINIC | Age: 84
End: 2019-02-28
Payer: MEDICARE

## 2019-02-28 VITALS
HEART RATE: 92 BPM | DIASTOLIC BLOOD PRESSURE: 60 MMHG | RESPIRATION RATE: 14 BRPM | WEIGHT: 158.8 LBS | BODY MASS INDEX: 27.11 KG/M2 | HEIGHT: 64 IN | SYSTOLIC BLOOD PRESSURE: 124 MMHG | TEMPERATURE: 97.1 F | OXYGEN SATURATION: 98 %

## 2019-02-28 DIAGNOSIS — I10 ESSENTIAL HYPERTENSION: Primary | ICD-10-CM

## 2019-02-28 DIAGNOSIS — F41.9 ANXIETY: ICD-10-CM

## 2019-02-28 DIAGNOSIS — F32.9 REACTIVE DEPRESSION: ICD-10-CM

## 2019-02-28 DIAGNOSIS — M20.41 HAMMERTOE OF RIGHT FOOT: ICD-10-CM

## 2019-02-28 DIAGNOSIS — M21.619 BUNION OF GREAT TOE: ICD-10-CM

## 2019-02-28 DIAGNOSIS — Z23 ENCOUNTER FOR IMMUNIZATION: ICD-10-CM

## 2019-02-28 PROCEDURE — 99215 OFFICE O/P EST HI 40 MIN: CPT | Performed by: INTERNAL MEDICINE

## 2019-02-28 RX ORDER — OSELTAMIVIR PHOSPHATE 75 MG/1
CAPSULE ORAL
COMMUNITY
Start: 2017-01-11 | End: 2019-09-06 | Stop reason: ALTCHOICE

## 2019-02-28 RX ORDER — PROCHLORPERAZINE MALEATE 10 MG
TABLET ORAL
COMMUNITY
Start: 2017-01-11 | End: 2020-08-20 | Stop reason: ALTCHOICE

## 2019-02-28 RX ORDER — TIMOLOL MALEATE 5 MG/ML
SOLUTION OPHTHALMIC
COMMUNITY
End: 2020-05-08 | Stop reason: ALTCHOICE

## 2019-02-28 RX ORDER — CYCLOSPORINE 0.5 MG/ML
EMULSION OPHTHALMIC
COMMUNITY

## 2019-02-28 NOTE — PROGRESS NOTES
Assessment/Plan:    Hypertension  Patient's blood pressure today is 124/60  She is encouraged to continue on her present dose of lisinopril 40 mg daily  She has no secondary symptoms of hypertension nor any evidence of side effects of the medication  Bunion of great toe  Bunion of the great toe of the right foot with comfortable shoes the patient can tolerate this I do not recommend surgical intervention for correction of this deformity at this point in the patient's life  Hammertoe of right foot  Hammertoe of the 2nd toe of the right foot again she tolerates this with the comfortable shoes that have enough to space in the toe box  Would not recommend surgical intervention at this time  Anxiety  Chronic anxiety compounded by the recent loss of her   She does have occasional tearful spells during the visit today  She has alprazolam 0 25 mg which she can use twice a day  She shows no evidence of abuse or dependence on the medication at this time  Reactive depression  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   Patient and I had a extended discussion today about the grieving process  She seems to be going through this normally at the present time  She has a counselor that she meets 1 on 1 with she also meets with a group therapy session at a local [facility] weekly  Diagnoses and all orders for this visit:    Essential hypertension    Encounter for immunization  -     PNEUMOCOCCAL POLYSACCHARIDE VACCINE 23-VALENT =>3YO SQ IM    Anxiety    Reactive depression    Other orders  -     aspirin 81 MG tablet; Take by mouth  -     cycloSPORINE (RESTASIS) 0 05 % ophthalmic emulsion; Apply to eye  -     timolol (TIMOPTIC-XE) 0 5 % ophthalmic gel-forming; Apply to eye  -     bimatoprost (LUMIGAN) 0 01 % ophthalmic drops; Apply to eye  -     cholecalciferol (VITAMIN D3) 1,000 units tablet; Take by mouth  -     oseltamivir (TAMIFLU) 75 mg capsule;  Take by mouth  -     prochlorperazine (COMPAZINE) 10 mg tablet; Take by mouth        Subjective:      Patient ID: Lory Whitley is a 82 y o  female  This 82-year-old female patient presents today for a routine follow-up visit  Since her last visit with us she unfortunately lost her  unexpectedly  She is showing some signs of mild depression on today's visit  She is tearful at times  She lives in a prison community and has no immediate family in the [place] area  Her 3 daughters have been supportive had encouraged her to come visit them and she is considering this in the future  She is going to a counselor and also a group therapy session weekly at a local [facility]  The following portions of the patient's history were reviewed and updated as appropriate:   She  has a past medical history of Epistaxis and Saphenous vein thrombophlebitis, right (2012)  She   Patient Active Problem List    Diagnosis Date Noted    Reactive depression 02/28/2019    Bunion of great toe 02/28/2019    Hammertoe of right foot 02/28/2019    Hyperlipidemia 10/31/2018    Glucose intolerance 10/31/2018    Flatulence 10/31/2018    Sleep disturbance 06/29/2018    Anxiety 10/09/2014    Asymptomatic varicose veins 10/09/2014    Fibromyalgia 10/09/2014    Hypertension 10/09/2014     She  has a past surgical history that includes Gallbladder surgery (2006)  Her family history includes Hypertension in her mother  She  reports that she has never smoked  She has never used smokeless tobacco  Her alcohol and drug histories are not on file    Current Outpatient Medications   Medication Sig Dispense Refill    ALPRAZolam (XANAX) 0 25 mg tablet TAKE 1 TABLET BY MOUTH TWICE A DAY 60 tablet 2    amLODIPine (NORVASC) 5 mg tablet TAKE 1 TABLET BY MOUTH EVERY DAY 30 tablet 5    aspirin 81 MG tablet Take 1 tablet by mouth daily      aspirin 81 MG tablet Take by mouth      bimatoprost (LUMIGAN) 0 01 % ophthalmic drops Apply to eye      cholecalciferol (VITAMIN D3) 1,000 units tablet Take by mouth      cycloSPORINE (RESTASIS) 0 05 % ophthalmic emulsion Apply to eye      ergocalciferol (VITAMIN D2) 50,000 units Take 2,000 Units by mouth daily   5    famotidine (PEPCID) 20 mg tablet Take 1 tablet by mouth daily      fluticasone (FLONASE) 50 mcg/act nasal spray 1 spray into each nostril 2 (two) times a day 3 Bottle 3    lisinopril (ZESTRIL) 40 mg tablet TAKE 1 TABLET BY MOUTH EVERY DAY 90 tablet 3    loratadine (CLARITIN) 10 mg tablet Take 1 tablet by mouth daily      ondansetron (ZOFRAN) 4 mg tablet Take 1 tablet (4 mg total) by mouth every 8 (eight) hours as needed for nausea or vomiting 10 tablet 1    oseltamivir (TAMIFLU) 75 mg capsule Take by mouth      PREMARIN vaginal cream APPLY 1/4 INCH WITH FINGER TO VAGINA MONDAY AND FRIDAY AT BEDTIME  2    prochlorperazine (COMPAZINE) 10 mg tablet Take by mouth      RESTASIS MULTIDOSE 0 05 % ophthalmic emulsion       timolol (TIMOPTIC) 0 5 % ophthalmic solution INSTILL 1 DROP INTO RIGHT EYE EVERY DAY  2    timolol (TIMOPTIC-XE) 0 5 % ophthalmic gel-forming Apply to eye      ZIOPTAN 0 0015 % ophthalmic solution ADMINISTER 1 DROP OPHTHALMICALLY AT BEDTIME IN THE RIGHT EYE, BRAND NECESSARY  6     No current facility-administered medications for this visit       Review of Systems   Musculoskeletal: Positive for arthralgias  Deformity of the 1st and 2nd toes of the right foot  Psychiatric/Behavioral: Positive for dysphoric mood  The patient is nervous/anxious  All other systems reviewed and are negative  Objective:      /60   Pulse 92   Temp (!) 97 1 °F (36 2 °C)   Resp 14   Ht 5' 4" (1 626 m)   Wt 72 kg (158 lb 12 8 oz)   SpO2 98%   BMI 27 26 kg/m²          Physical Exam   Constitutional: She is oriented to person, place, and time  Vital signs are normal  She appears well-developed and well-nourished  She is cooperative     HENT:   Right Ear: Hearing, tympanic membrane, external ear and ear canal normal    Left Ear: Hearing, tympanic membrane, external ear and ear canal normal    Nose: Nose normal  No mucosal edema  Mouth/Throat: Uvula is midline, oropharynx is clear and moist and mucous membranes are normal    Eyes: Pupils are equal, round, and reactive to light  Conjunctivae and lids are normal    Neck: No JVD present  Carotid bruit is not present  No thyromegaly present  Cardiovascular: Normal rate, regular rhythm, normal heart sounds and intact distal pulses  No murmur heard  Pulmonary/Chest: Effort normal and breath sounds normal  No respiratory distress  Abdominal: Soft  Normal appearance and bowel sounds are normal    Musculoskeletal: Normal range of motion  She exhibits no edema  Bunion of the 1st toe of the right foot with deformity and hammertoe of the 2nd toe of the right foot   Lymphadenopathy:     She has no cervical adenopathy  Neurological: She is alert and oriented to person, place, and time  She has normal reflexes  She displays normal reflexes  Skin: Skin is warm, dry and intact  Psychiatric: Her speech is normal and behavior is normal  Judgment and thought content normal  Cognition and memory are normal    Vitals reviewed

## 2019-02-28 NOTE — ASSESSMENT & PLAN NOTE
Chronic anxiety compounded by the recent loss of her   She does have occasional tearful spells during the visit today  She has alprazolam 0 25 mg which she can use twice a day  She shows no evidence of abuse or dependence on the medication at this time

## 2019-02-28 NOTE — ASSESSMENT & PLAN NOTE
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

## 2019-02-28 NOTE — ASSESSMENT & PLAN NOTE
Patient's blood pressure today is 124/60  She is encouraged to continue on her present dose of lisinopril 40 mg daily  She has no secondary symptoms of hypertension nor any evidence of side effects of the medication

## 2019-02-28 NOTE — ASSESSMENT & PLAN NOTE
Zeke of the 2nd toe of the right foot again she tolerates this with the comfortable shoes that have enough to space in the toe box  Would not recommend surgical intervention at this time

## 2019-03-02 DIAGNOSIS — F41.9 ANXIETY: ICD-10-CM

## 2019-03-04 RX ORDER — ALPRAZOLAM 0.25 MG/1
TABLET ORAL
Qty: 60 TABLET | Refills: 2 | Status: SHIPPED | OUTPATIENT
Start: 2019-03-04 | End: 2019-05-02 | Stop reason: SDUPTHER

## 2019-04-02 ENCOUNTER — TRANSCRIBE ORDERS (OUTPATIENT)
Dept: ADMINISTRATIVE | Age: 84
End: 2019-04-02

## 2019-04-02 ENCOUNTER — APPOINTMENT (OUTPATIENT)
Dept: LAB | Age: 84
End: 2019-04-02
Payer: MEDICARE

## 2019-04-02 DIAGNOSIS — R50.9 HYPERTHERMIA-INDUCED DEFECT: ICD-10-CM

## 2019-04-02 DIAGNOSIS — R50.9 HYPERTHERMIA-INDUCED DEFECT: Primary | ICD-10-CM

## 2019-04-02 LAB
ALBUMIN SERPL BCP-MCNC: 3.7 G/DL (ref 3.5–5)
ALP SERPL-CCNC: 73 U/L (ref 46–116)
ALT SERPL W P-5'-P-CCNC: 19 U/L (ref 12–78)
ANION GAP SERPL CALCULATED.3IONS-SCNC: 4 MMOL/L (ref 4–13)
AST SERPL W P-5'-P-CCNC: 15 U/L (ref 5–45)
BILIRUB SERPL-MCNC: 0.5 MG/DL (ref 0.2–1)
BUN SERPL-MCNC: 28 MG/DL (ref 5–25)
CALCIUM SERPL-MCNC: 9.5 MG/DL (ref 8.3–10.1)
CHLORIDE SERPL-SCNC: 105 MMOL/L (ref 100–108)
CO2 SERPL-SCNC: 28 MMOL/L (ref 21–32)
CREAT SERPL-MCNC: 0.84 MG/DL (ref 0.6–1.3)
ERYTHROCYTE [DISTWIDTH] IN BLOOD BY AUTOMATED COUNT: 13.2 % (ref 11.6–15.1)
GFR SERPL CREATININE-BSD FRML MDRD: 65 ML/MIN/1.73SQ M
GLUCOSE SERPL-MCNC: 97 MG/DL (ref 65–140)
HCT VFR BLD AUTO: 44.3 % (ref 34.8–46.1)
HGB BLD-MCNC: 14 G/DL (ref 11.5–15.4)
MCH RBC QN AUTO: 30 PG (ref 26.8–34.3)
MCHC RBC AUTO-ENTMCNC: 31.6 G/DL (ref 31.4–37.4)
MCV RBC AUTO: 95 FL (ref 82–98)
PLATELET # BLD AUTO: 274 THOUSANDS/UL (ref 149–390)
PMV BLD AUTO: 11.2 FL (ref 8.9–12.7)
POTASSIUM SERPL-SCNC: 4 MMOL/L (ref 3.5–5.3)
PROT SERPL-MCNC: 7 G/DL (ref 6.4–8.2)
RBC # BLD AUTO: 4.67 MILLION/UL (ref 3.81–5.12)
SODIUM SERPL-SCNC: 137 MMOL/L (ref 136–145)
WBC # BLD AUTO: 6.46 THOUSAND/UL (ref 4.31–10.16)

## 2019-04-02 PROCEDURE — 85027 COMPLETE CBC AUTOMATED: CPT

## 2019-04-02 PROCEDURE — 36415 COLL VENOUS BLD VENIPUNCTURE: CPT

## 2019-04-02 PROCEDURE — 80053 COMPREHEN METABOLIC PANEL: CPT

## 2019-04-03 ENCOUNTER — OFFICE VISIT (OUTPATIENT)
Dept: INTERNAL MEDICINE CLINIC | Facility: CLINIC | Age: 84
End: 2019-04-03
Payer: MEDICARE

## 2019-04-03 VITALS
OXYGEN SATURATION: 97 % | HEIGHT: 64 IN | SYSTOLIC BLOOD PRESSURE: 118 MMHG | TEMPERATURE: 97.5 F | WEIGHT: 160 LBS | BODY MASS INDEX: 27.31 KG/M2 | HEART RATE: 86 BPM | DIASTOLIC BLOOD PRESSURE: 68 MMHG

## 2019-04-03 DIAGNOSIS — I10 ESSENTIAL HYPERTENSION: ICD-10-CM

## 2019-04-03 DIAGNOSIS — F41.9 ANXIETY: ICD-10-CM

## 2019-04-03 DIAGNOSIS — R07.89 CHEST WALL PAIN: ICD-10-CM

## 2019-04-03 DIAGNOSIS — F32.9 REACTIVE DEPRESSION: ICD-10-CM

## 2019-04-03 DIAGNOSIS — J01.00 ACUTE NON-RECURRENT MAXILLARY SINUSITIS: Primary | ICD-10-CM

## 2019-04-03 PROCEDURE — 99215 OFFICE O/P EST HI 40 MIN: CPT | Performed by: INTERNAL MEDICINE

## 2019-04-03 RX ORDER — AZITHROMYCIN 250 MG/1
TABLET, FILM COATED ORAL
Qty: 6 TABLET | Refills: 0 | Status: SHIPPED | OUTPATIENT
Start: 2019-04-03 | End: 2020-01-22 | Stop reason: SDUPTHER

## 2019-04-16 ENCOUNTER — TELEPHONE (OUTPATIENT)
Dept: INTERNAL MEDICINE CLINIC | Facility: CLINIC | Age: 84
End: 2019-04-16

## 2019-05-02 ENCOUNTER — OFFICE VISIT (OUTPATIENT)
Dept: INTERNAL MEDICINE CLINIC | Facility: CLINIC | Age: 84
End: 2019-05-02
Payer: MEDICARE

## 2019-05-02 VITALS
TEMPERATURE: 96.8 F | HEIGHT: 64 IN | WEIGHT: 160.6 LBS | OXYGEN SATURATION: 98 % | SYSTOLIC BLOOD PRESSURE: 104 MMHG | HEART RATE: 79 BPM | DIASTOLIC BLOOD PRESSURE: 68 MMHG | RESPIRATION RATE: 14 BRPM | BODY MASS INDEX: 27.42 KG/M2

## 2019-05-02 DIAGNOSIS — M20.41 HAMMERTOE OF RIGHT FOOT: ICD-10-CM

## 2019-05-02 DIAGNOSIS — M79.7 FIBROMYALGIA: ICD-10-CM

## 2019-05-02 DIAGNOSIS — N30.00 ACUTE CYSTITIS WITHOUT HEMATURIA: ICD-10-CM

## 2019-05-02 DIAGNOSIS — F32.9 REACTIVE DEPRESSION: ICD-10-CM

## 2019-05-02 DIAGNOSIS — I83.90 ASYMPTOMATIC VARICOSE VEINS: ICD-10-CM

## 2019-05-02 DIAGNOSIS — E78.2 MIXED HYPERLIPIDEMIA: ICD-10-CM

## 2019-05-02 DIAGNOSIS — I10 ESSENTIAL HYPERTENSION: ICD-10-CM

## 2019-05-02 DIAGNOSIS — Z00.00 HEALTHCARE MAINTENANCE: Primary | ICD-10-CM

## 2019-05-02 DIAGNOSIS — F41.9 ANXIETY: ICD-10-CM

## 2019-05-02 PROBLEM — J01.00 ACUTE NON-RECURRENT MAXILLARY SINUSITIS: Status: RESOLVED | Noted: 2019-04-03 | Resolved: 2019-05-02

## 2019-05-02 PROCEDURE — 93000 ELECTROCARDIOGRAM COMPLETE: CPT | Performed by: INTERNAL MEDICINE

## 2019-05-02 PROCEDURE — 99215 OFFICE O/P EST HI 40 MIN: CPT | Performed by: INTERNAL MEDICINE

## 2019-05-02 PROCEDURE — G0439 PPPS, SUBSEQ VISIT: HCPCS | Performed by: INTERNAL MEDICINE

## 2019-05-02 RX ORDER — ALPRAZOLAM 0.25 MG/1
0.25 TABLET ORAL 2 TIMES DAILY
Qty: 60 TABLET | Refills: 2 | Status: SHIPPED | OUTPATIENT
Start: 2019-05-02 | End: 2020-02-25 | Stop reason: SDUPTHER

## 2019-05-09 ENCOUNTER — APPOINTMENT (OUTPATIENT)
Dept: LAB | Age: 84
End: 2019-05-09
Payer: MEDICARE

## 2019-05-09 DIAGNOSIS — E78.2 MIXED HYPERLIPIDEMIA: ICD-10-CM

## 2019-05-09 DIAGNOSIS — E78.5 HYPERLIPIDEMIA, UNSPECIFIED HYPERLIPIDEMIA TYPE: ICD-10-CM

## 2019-05-09 LAB
CHOLEST SERPL-MCNC: 309 MG/DL (ref 50–200)
HDLC SERPL-MCNC: 67 MG/DL (ref 40–60)
LDLC SERPL CALC-MCNC: 219 MG/DL (ref 0–100)
NONHDLC SERPL-MCNC: 242 MG/DL
TRIGL SERPL-MCNC: 115 MG/DL

## 2019-05-09 PROCEDURE — 36415 COLL VENOUS BLD VENIPUNCTURE: CPT

## 2019-05-09 PROCEDURE — 80061 LIPID PANEL: CPT

## 2019-05-10 ENCOUNTER — TRANSCRIBE ORDERS (OUTPATIENT)
Dept: ADMINISTRATIVE | Age: 84
End: 2019-05-10

## 2019-05-10 ENCOUNTER — APPOINTMENT (OUTPATIENT)
Dept: LAB | Age: 84
End: 2019-05-10
Payer: MEDICARE

## 2019-05-10 DIAGNOSIS — N30.00 ACUTE RECURRENT CYSTITIS: Primary | ICD-10-CM

## 2019-05-10 DIAGNOSIS — N30.00 ACUTE CYSTITIS WITHOUT HEMATURIA: Primary | ICD-10-CM

## 2019-05-10 LAB
BACTERIA UR QL AUTO: ABNORMAL /HPF
BILIRUB UR QL STRIP: NEGATIVE
CLARITY UR: CLEAR
COLOR UR: YELLOW
GLUCOSE UR STRIP-MCNC: NEGATIVE MG/DL
HGB UR QL STRIP.AUTO: NEGATIVE
HYALINE CASTS #/AREA URNS LPF: ABNORMAL /LPF
KETONES UR STRIP-MCNC: NEGATIVE MG/DL
LEUKOCYTE ESTERASE UR QL STRIP: ABNORMAL
NITRITE UR QL STRIP: NEGATIVE
NON-SQ EPI CELLS URNS QL MICRO: ABNORMAL /HPF
PH UR STRIP.AUTO: 6 [PH]
PROT UR STRIP-MCNC: NEGATIVE MG/DL
RBC #/AREA URNS AUTO: ABNORMAL /HPF
SP GR UR STRIP.AUTO: 1.02 (ref 1–1.03)
UROBILINOGEN UR QL STRIP.AUTO: 0.2 E.U./DL
WBC #/AREA URNS AUTO: ABNORMAL /HPF

## 2019-05-10 PROCEDURE — 87086 URINE CULTURE/COLONY COUNT: CPT

## 2019-05-10 PROCEDURE — 81001 URINALYSIS AUTO W/SCOPE: CPT

## 2019-05-12 LAB — BACTERIA UR CULT: NORMAL

## 2019-05-14 ENCOUNTER — TELEPHONE (OUTPATIENT)
Dept: INTERNAL MEDICINE CLINIC | Facility: CLINIC | Age: 84
End: 2019-05-14

## 2019-05-20 DIAGNOSIS — I10 ESSENTIAL HYPERTENSION: ICD-10-CM

## 2019-05-20 RX ORDER — AMLODIPINE BESYLATE 5 MG/1
5 TABLET ORAL DAILY
Qty: 30 TABLET | Refills: 5 | Status: SHIPPED | OUTPATIENT
Start: 2019-05-20 | End: 2019-09-30

## 2019-06-12 ENCOUNTER — OFFICE VISIT (OUTPATIENT)
Dept: INTERNAL MEDICINE CLINIC | Facility: CLINIC | Age: 84
End: 2019-06-12
Payer: MEDICARE

## 2019-06-12 VITALS
DIASTOLIC BLOOD PRESSURE: 70 MMHG | OXYGEN SATURATION: 98 % | WEIGHT: 162 LBS | HEART RATE: 80 BPM | HEIGHT: 64 IN | RESPIRATION RATE: 16 BRPM | SYSTOLIC BLOOD PRESSURE: 122 MMHG | BODY MASS INDEX: 27.66 KG/M2 | TEMPERATURE: 97.8 F

## 2019-06-12 DIAGNOSIS — G47.9 SLEEP DISTURBANCE: ICD-10-CM

## 2019-06-12 DIAGNOSIS — F41.9 ANXIETY: ICD-10-CM

## 2019-06-12 DIAGNOSIS — F32.9 REACTIVE DEPRESSION: ICD-10-CM

## 2019-06-12 DIAGNOSIS — T78.40XA ALLERGIC STATE, INITIAL ENCOUNTER: Primary | ICD-10-CM

## 2019-06-12 DIAGNOSIS — M79.7 FIBROMYALGIA: ICD-10-CM

## 2019-06-12 DIAGNOSIS — R07.89 CHEST WALL PAIN: ICD-10-CM

## 2019-06-12 DIAGNOSIS — I10 ESSENTIAL HYPERTENSION: ICD-10-CM

## 2019-06-12 PROCEDURE — 99215 OFFICE O/P EST HI 40 MIN: CPT | Performed by: INTERNAL MEDICINE

## 2019-06-12 RX ORDER — MONTELUKAST SODIUM 10 MG/1
10 TABLET ORAL
Qty: 30 TABLET | Refills: 3 | Status: SHIPPED | OUTPATIENT
Start: 2019-06-12 | End: 2019-10-15 | Stop reason: SDUPTHER

## 2019-07-16 ENCOUNTER — OFFICE VISIT (OUTPATIENT)
Dept: INTERNAL MEDICINE CLINIC | Facility: CLINIC | Age: 84
End: 2019-07-16
Payer: MEDICARE

## 2019-07-16 VITALS
BODY MASS INDEX: 27.83 KG/M2 | HEIGHT: 64 IN | WEIGHT: 163 LBS | HEART RATE: 77 BPM | TEMPERATURE: 97.6 F | OXYGEN SATURATION: 97 % | SYSTOLIC BLOOD PRESSURE: 110 MMHG | DIASTOLIC BLOOD PRESSURE: 60 MMHG

## 2019-07-16 DIAGNOSIS — I10 ESSENTIAL HYPERTENSION: ICD-10-CM

## 2019-07-16 DIAGNOSIS — R05.9 COUGH: Primary | ICD-10-CM

## 2019-07-16 PROCEDURE — 99213 OFFICE O/P EST LOW 20 MIN: CPT | Performed by: NURSE PRACTITIONER

## 2019-07-16 RX ORDER — BENZONATATE 100 MG/1
100 CAPSULE ORAL 3 TIMES DAILY PRN
Qty: 20 CAPSULE | Refills: 0 | Status: SHIPPED | OUTPATIENT
Start: 2019-07-16 | End: 2019-09-06 | Stop reason: ALTCHOICE

## 2019-07-16 NOTE — ASSESSMENT & PLAN NOTE
Lungs clear, vitals stable  She feels cough is improving  Cough likely due to post nasal drip/allergies  Reinforced use of flonase BID  She has been on claritin many years and suggested changing to zyrtec  She is hesitant to change because she worries about side effects since she lives alone  Suggested she drink warm liquids with honey  She is also given an rx for benzonatate prn    Pt instructed to call for reevaluation if sx worsen or persist

## 2019-07-16 NOTE — PROGRESS NOTES
Assessment/Plan:    Hypertension  Blood pressure is well controlled on lisinopril and amlodipine  Continue as prescribed  Cough  Lungs clear, vitals stable  She feels cough is improving  Cough likely due to post nasal drip/allergies  Reinforced use of flonase BID  She has been on claritin many years and suggested changing to zyrtec  She is hesitant to change because she worries about side effects since she lives alone  Suggested she drink warm liquids with honey  She is also given an rx for benzonatate prn  Pt instructed to call for reevaluation if sx worsen or persist          Diagnoses and all orders for this visit:    Cough  -     benzonatate (TESSALON PERLES) 100 mg capsule; Take 1 capsule (100 mg total) by mouth 3 (three) times a day as needed for cough    Essential hypertension          Subjective:      Patient ID: Alicia Quintanilla is a 82 y o  female  Pt is a 82 y o  y/o female who is seen today for evaluation of a cough x 1 week  She believes this is allergies  She is using claritin and flonase daily  She called the office and she was advised to take coricidin for her cough  She denies fever/chill, shortness of breath  No headache, nasal congestion, runny nose, post nasal drip, dizziness  Appetite is normal, no N/V/D  The following portions of the patient's history were reviewed and updated as appropriate: allergies, current medications, past family history, past medical history, past social history, past surgical history and problem list     Review of Systems   Constitutional: Negative for appetite change, chills, fatigue and fever  HENT: Negative for congestion, ear pain, postnasal drip and sinus pressure  Respiratory: Positive for cough  Negative for chest tightness, shortness of breath and wheezing  Cardiovascular: Negative for chest pain, palpitations and leg swelling  Gastrointestinal: Negative for diarrhea, nausea and vomiting     Genitourinary: Negative for dysuria  Musculoskeletal: Negative for myalgias  Allergic/Immunologic: Positive for environmental allergies  Neurological: Negative for dizziness and headaches  Hematological: Negative for adenopathy  Psychiatric/Behavioral: Negative for sleep disturbance  Objective:      /60 (BP Location: Left arm, Patient Position: Sitting, Cuff Size: Adult)   Pulse 77   Temp 97 6 °F (36 4 °C) (Tympanic)   Ht 5' 4" (1 626 m)   Wt 73 9 kg (163 lb)   SpO2 97%   BMI 27 98 kg/m²          Physical Exam   Constitutional: She is oriented to person, place, and time  Vital signs are normal  She appears well-developed and well-nourished  She is cooperative  HENT:   Right Ear: Hearing, tympanic membrane, external ear and ear canal normal  Tympanic membrane is not bulging  No middle ear effusion  Left Ear: Hearing, tympanic membrane, external ear and ear canal normal  Tympanic membrane is not bulging  No middle ear effusion  Nose: No mucosal edema or rhinorrhea  Mouth/Throat: Mucous membranes are not dry  No oropharyngeal exudate, posterior oropharyngeal edema, posterior oropharyngeal erythema or tonsillar abscesses  Eyes: Conjunctivae and lids are normal    Cardiovascular: Normal rate, regular rhythm, normal heart sounds and intact distal pulses  No murmur heard  Pulmonary/Chest: Effort normal and breath sounds normal  No accessory muscle usage  No respiratory distress  She has no decreased breath sounds  She has no wheezes  She has no rhonchi  She has no rales  Musculoskeletal: She exhibits no edema  Lymphadenopathy:        Head (right side): No submental, no submandibular, no tonsillar, no preauricular, no posterior auricular and no occipital adenopathy present  Head (left side): No submental, no submandibular, no tonsillar, no preauricular, no posterior auricular and no occipital adenopathy present  She has no cervical adenopathy     Neurological: She is alert and oriented to person, place, and time  Skin: Skin is warm, dry and intact  Psychiatric: She has a normal mood and affect  Her speech is normal and behavior is normal  Judgment and thought content normal  Cognition and memory are normal    Vitals reviewed

## 2019-08-27 DIAGNOSIS — F41.9 ANXIETY: ICD-10-CM

## 2019-08-27 RX ORDER — ALPRAZOLAM 0.25 MG/1
TABLET ORAL
Qty: 60 TABLET | Refills: 2 | Status: SHIPPED | OUTPATIENT
Start: 2019-08-27 | End: 2019-09-06 | Stop reason: SDUPTHER

## 2019-09-06 ENCOUNTER — OFFICE VISIT (OUTPATIENT)
Dept: INTERNAL MEDICINE CLINIC | Facility: CLINIC | Age: 84
End: 2019-09-06
Payer: MEDICARE

## 2019-09-06 VITALS
HEART RATE: 73 BPM | TEMPERATURE: 96.9 F | DIASTOLIC BLOOD PRESSURE: 70 MMHG | SYSTOLIC BLOOD PRESSURE: 128 MMHG | BODY MASS INDEX: 28.27 KG/M2 | WEIGHT: 165.6 LBS | OXYGEN SATURATION: 94 % | RESPIRATION RATE: 14 BRPM | HEIGHT: 64 IN

## 2019-09-06 DIAGNOSIS — G47.9 SLEEP DISTURBANCE: ICD-10-CM

## 2019-09-06 DIAGNOSIS — I10 ESSENTIAL HYPERTENSION: ICD-10-CM

## 2019-09-06 DIAGNOSIS — F32.9 REACTIVE DEPRESSION: ICD-10-CM

## 2019-09-06 DIAGNOSIS — M21.619 BUNION OF GREAT TOE: Primary | ICD-10-CM

## 2019-09-06 DIAGNOSIS — M79.7 FIBROMYALGIA: ICD-10-CM

## 2019-09-06 PROCEDURE — 99214 OFFICE O/P EST MOD 30 MIN: CPT | Performed by: INTERNAL MEDICINE

## 2019-09-06 NOTE — ASSESSMENT & PLAN NOTE
Patient concerned about the bunion of the 1st toe of the right foot  While she does have swelling and decreased range of motion of the joint I recommend no surgical intervention  She should wear of shoes that give her plenty of room to move her toes

## 2019-09-06 NOTE — ASSESSMENT & PLAN NOTE
Chronic sleep disturbances secondary to anxiety and mild depression symptoms  The patient does have a prescription for Xanax which she can use on an as-needed basis  We see no evidence of abuse or dependence on this medication

## 2019-09-06 NOTE — ASSESSMENT & PLAN NOTE
Reactive depression symptoms continue  The patient is continuing to go to counseling sessions every other week and is participating in a group grieving section  She is spending more time with friends going out to dinner in social events  She is encouraged to continue to increase her social activities

## 2019-09-06 NOTE — ASSESSMENT & PLAN NOTE
Chronic fibromyalgia symptoms remain stable at the present time  Recommend the patient remain physically active  She can use nonsteroidal anti inflammatory medication Advil or Aleve for the occasional relief of increased symptoms

## 2019-09-06 NOTE — PROGRESS NOTES
Assessment/Plan:    Hypertension  The patient's hypertension was assessed during today's visit  We find blood pressure to be under good control and I recommend the continuation of her present therapy with follow-up assessment in 4 months  She has no apparent side effects of her blood pressure medication nor any symptoms to indicate uncontrolled hypertension or hypotension  Bunion of great toe  Patient concerned about the bunion of the 1st toe of the right foot  While she does have swelling and decreased range of motion of the joint I recommend no surgical intervention  She should wear of shoes that give her plenty of room to move her toes  Sleep disturbance  Chronic sleep disturbances secondary to anxiety and mild depression symptoms  The patient does have a prescription for Xanax which she can use on an as-needed basis  We see no evidence of abuse or dependence on this medication  Reactive depression  Reactive depression symptoms continue  The patient is continuing to go to counseling sessions every other week and is participating in a group grieving section  She is spending more time with friends going out to dinner in social events  She is encouraged to continue to increase her social activities  Fibromyalgia  Chronic fibromyalgia symptoms remain stable at the present time  Recommend the patient remain physically active  She can use nonsteroidal anti inflammatory medication Advil or Aleve for the occasional relief of increased symptoms  Diagnoses and all orders for this visit:    Bunion of great toe    Essential hypertension    Reactive depression    Sleep disturbance        Subjective:      Patient ID: Anton Andrews is a 82 y o  female  This 43-year-old female patient presents today for routine follow-up assessment of hypertension  She continues to go through the grieving process having lost her  approximately 9 months ago    She continues to see her counselor every other week and is continuing to go to a grieving support group  She is becoming more active going out with friends for dinner and movies  She is considering possibility of changing her residence from her present [facility] adult community to possibly a half-way community  The following portions of the patient's history were reviewed and updated as appropriate:   She  has a past medical history of Epistaxis and Saphenous vein thrombophlebitis, right (2012)  She   Patient Active Problem List    Diagnosis Date Noted    Cough 07/16/2019    Allergy 06/12/2019    Chest wall pain 04/03/2019    Reactive depression 02/28/2019    Bunion of great toe 02/28/2019    Hammertoe of right foot 02/28/2019    Hyperlipidemia 10/31/2018    Glucose intolerance 10/31/2018    Flatulence 10/31/2018    Sleep disturbance 06/29/2018    Anxiety 10/09/2014    Asymptomatic varicose veins 10/09/2014    Fibromyalgia 10/09/2014    Hypertension 10/09/2014     She  has a past surgical history that includes Gallbladder surgery (2006)  Her family history includes Hypertension in her mother  She  reports that she has never smoked  She has never used smokeless tobacco  Her alcohol and drug histories are not on file    Current Outpatient Medications   Medication Sig Dispense Refill    ALPRAZolam (XANAX) 0 25 mg tablet Take 1 tablet (0 25 mg total) by mouth 2 (two) times a day 60 tablet 2    amLODIPine (NORVASC) 5 mg tablet Take 1 tablet (5 mg total) by mouth daily 30 tablet 5    aspirin 81 MG tablet Take 1 tablet by mouth daily      bimatoprost (LUMIGAN) 0 01 % ophthalmic drops Apply to eye      cholecalciferol (VITAMIN D3) 1,000 units tablet Take by mouth      cycloSPORINE (RESTASIS) 0 05 % ophthalmic emulsion Apply to eye      ergocalciferol (VITAMIN D2) 50,000 units Take 2,000 Units by mouth daily   5    famotidine (PEPCID) 20 mg tablet Take 1 tablet by mouth daily      fluticasone (FLONASE) 50 mcg/act nasal spray 1 spray into each nostril 2 (two) times a day 3 Bottle 3    lisinopril (ZESTRIL) 40 mg tablet TAKE 1 TABLET BY MOUTH EVERY DAY 90 tablet 3    loratadine (CLARITIN) 10 mg tablet Take 1 tablet by mouth daily      montelukast (SINGULAIR) 10 mg tablet Take 1 tablet (10 mg total) by mouth daily at bedtime 30 tablet 3    ondansetron (ZOFRAN) 4 mg tablet Take 1 tablet (4 mg total) by mouth every 8 (eight) hours as needed for nausea or vomiting 10 tablet 1    PREMARIN vaginal cream APPLY 1/4 INCH WITH FINGER TO VAGINA MONDAY AND FRIDAY AT BEDTIME  2    prochlorperazine (COMPAZINE) 10 mg tablet Take by mouth      timolol (TIMOPTIC) 0 5 % ophthalmic solution INSTILL 1 DROP INTO RIGHT EYE EVERY DAY  2    timolol (TIMOPTIC-XE) 0 5 % ophthalmic gel-forming Apply to eye      ZIOPTAN 0 0015 % ophthalmic solution ADMINISTER 1 DROP OPHTHALMICALLY AT BEDTIME IN THE RIGHT EYE, BRAND NECESSARY  6     No current facility-administered medications for this visit       Review of Systems   Psychiatric/Behavioral: Positive for dysphoric mood and sleep disturbance  The patient is nervous/anxious  All other systems reviewed and are negative  Objective:      /70   Pulse 73   Temp (!) 96 9 °F (36 1 °C)   Resp 14   Ht 5' 4" (1 626 m)   Wt 75 1 kg (165 lb 9 6 oz)   SpO2 94%   BMI 28 43 kg/m²          Physical Exam   Constitutional: She is oriented to person, place, and time  Vital signs are normal  She appears well-developed and well-nourished  She is cooperative  HENT:   Right Ear: Hearing, tympanic membrane, external ear and ear canal normal    Left Ear: Hearing, tympanic membrane, external ear and ear canal normal    Nose: Nose normal  No mucosal edema  Mouth/Throat: Uvula is midline, oropharynx is clear and moist and mucous membranes are normal    Eyes: Pupils are equal, round, and reactive to light  Conjunctivae and lids are normal    Neck: No JVD present  Carotid bruit is not present  No thyromegaly present  Cardiovascular: Normal rate, regular rhythm, normal heart sounds and intact distal pulses  No murmur heard  Pulmonary/Chest: Effort normal and breath sounds normal  No respiratory distress  Abdominal: Soft  Normal appearance and bowel sounds are normal    Musculoskeletal: Normal range of motion  She exhibits tenderness and deformity  She exhibits no edema  Bunion deformity of the 1st toe of both feet   Lymphadenopathy:     She has no cervical adenopathy  Neurological: She is alert and oriented to person, place, and time  She has normal reflexes  She displays normal reflexes  Skin: Skin is warm, dry and intact  Psychiatric: She has a normal mood and affect  Her speech is normal and behavior is normal  Judgment and thought content normal  Cognition and memory are normal    Vitals reviewed

## 2019-09-06 NOTE — ASSESSMENT & PLAN NOTE
The patient's hypertension was assessed during today's visit  We find blood pressure to be under good control and I recommend the continuation of her present therapy with follow-up assessment in 4 months  She has no apparent side effects of her blood pressure medication nor any symptoms to indicate uncontrolled hypertension or hypotension

## 2019-09-18 ENCOUNTER — TELEPHONE (OUTPATIENT)
Dept: INTERNAL MEDICINE CLINIC | Facility: CLINIC | Age: 84
End: 2019-09-18

## 2019-09-18 ENCOUNTER — OFFICE VISIT (OUTPATIENT)
Dept: INTERNAL MEDICINE CLINIC | Facility: CLINIC | Age: 84
End: 2019-09-18
Payer: MEDICARE

## 2019-09-18 VITALS
DIASTOLIC BLOOD PRESSURE: 64 MMHG | RESPIRATION RATE: 16 BRPM | OXYGEN SATURATION: 97 % | TEMPERATURE: 97.8 F | SYSTOLIC BLOOD PRESSURE: 112 MMHG | BODY MASS INDEX: 28.43 KG/M2 | HEART RATE: 74 BPM | HEIGHT: 64 IN
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
## 2019-09-18 PROCEDURE — 99214 OFFICE O/P EST MOD 30 MIN: CPT | Performed by: NURSE PRACTITIONER

## 2019-09-18 NOTE — PROGRESS NOTES
Assessment/Plan:    Fall  Pt suffered a mechanical fall last night, tripping over cement in a parking lot  She did not hit her head or lose consciousness  Landed on her knees and left hand  She has some bruising on the left knee and complains of muscle aching on there left flank that is not aggravated by breathing  She is advised to use aleve or ibuprofen as well as ice at intervals and rest to help promote decreased inflammation  She is reassured that she does not appear to have any broken bones  She will be in for f/u on 9/30 but will call if her symptoms worsen prior to that visit  Diagnoses and all orders for this visit:    Fall, initial encounter          Subjective:      Patient ID: Marquise Meyerr is a 82 y o  female  Pt is a 82 y o  y/o female who is seen today for evaluation after a mechanical fall last night  She was out to eat with her friends last night and did not notice a parking block in the parking lot and tripped over it  She landed on her knees and her left hand  She has bruising and abrasions on her left knee  She noted this morning soreness on her left side as well  She denies pain with inhalation or exhalation but movement seems to make this soreness more pronounced  The following portions of the patient's history were reviewed and updated as appropriate: allergies, current medications, past family history, past medical history, past social history, past surgical history and problem list     Review of Systems   Respiratory: Negative for chest tightness and shortness of breath  Cardiovascular: Negative for chest pain, palpitations and leg swelling  Musculoskeletal: Positive for joint swelling and myalgias  Negative for arthralgias and back pain  Skin: Positive for color change  Negative for rash and wound  Neurological: Negative for dizziness, weakness and headaches  Hematological: Negative for adenopathy           Objective:      /64 (Cuff Size: Large)   Pulse 74   Temp 97 8 °F (36 6 °C)   Resp 16   Ht 5' 4" (1 626 m)   SpO2 97%   BMI 28 43 kg/m²          Physical Exam   Constitutional: She is oriented to person, place, and time  Vital signs are normal  She appears well-developed and well-nourished  She is cooperative  Neck: Normal range of motion and full passive range of motion without pain  No muscular tenderness present  No neck rigidity  Normal range of motion present  Cardiovascular: Normal rate, regular rhythm and intact distal pulses  Pulmonary/Chest: Effort normal and breath sounds normal    Abdominal: Soft  There is no tenderness  Musculoskeletal: Normal range of motion  She exhibits no edema  Left knee: She exhibits swelling and ecchymosis  She exhibits normal range of motion, no effusion and no laceration  Thoracic back: She exhibits tenderness  She exhibits normal range of motion, no edema, no deformity, no laceration and no pain  Neurological: She is alert and oriented to person, place, and time  She has normal strength  Skin: Skin is warm and dry  Bruising noted  No erythema  There are two ecchymotic areas on the left anterior knee with some mild swelling and generalized bruising  Only mild tenderness reported with palpation  ROM is normal      Psychiatric: She has a normal mood and affect  Her speech is normal and behavior is normal  Judgment and thought content normal  Cognition and memory are normal    Vitals reviewed

## 2019-09-18 NOTE — TELEPHONE ENCOUNTER
Patient called to notify us that she fell last night and is having slight discomfort underneath her rib area  She asked if there is anything she can do to relieve her pain  I told her that the doctor will more than likely need to see her before moving forward  She insisted on asking the doctor first before making an appointment

## 2019-09-18 NOTE — TELEPHONE ENCOUNTER
Called patient to schedule a same day appointment  Waiting for a call back to clarify if she has transportation or not

## 2019-09-18 NOTE — ASSESSMENT & PLAN NOTE
Pt suffered a mechanical fall last night, tripping over cement in a parking lot  She did not hit her head or lose consciousness  Landed on her knees and left hand  She has some bruising on the left knee and complains of muscle aching on there left flank that is not aggravated by breathing  She is advised to use aleve or ibuprofen as well as ice at intervals and rest to help promote decreased inflammation  She is reassured that she does not appear to have any broken bones  She will be in for f/u on 9/30 but will call if her symptoms worsen prior to that visit

## 2019-09-18 NOTE — TELEPHONE ENCOUNTER
Please have the patient make an appointment for an assessment in the office  She can see Talita Ayala if thaere are no openings with me today    Thank you

## 2019-09-30 ENCOUNTER — OFFICE VISIT (OUTPATIENT)
Dept: INTERNAL MEDICINE CLINIC | Facility: CLINIC | Age: 84
End: 2019-09-30
Payer: MEDICARE

## 2019-09-30 VITALS
RESPIRATION RATE: 14 BRPM | HEART RATE: 67 BPM | TEMPERATURE: 97.4 F | OXYGEN SATURATION: 95 % | DIASTOLIC BLOOD PRESSURE: 62 MMHG | HEIGHT: 64 IN | WEIGHT: 165.2 LBS | SYSTOLIC BLOOD PRESSURE: 104 MMHG | BODY MASS INDEX: 28.2 KG/M2

## 2019-09-30 DIAGNOSIS — F41.9 ANXIETY: ICD-10-CM

## 2019-09-30 DIAGNOSIS — Z23 ENCOUNTER FOR IMMUNIZATION: ICD-10-CM

## 2019-09-30 DIAGNOSIS — I83.93 VARICOSE VEINS OF BOTH LOWER EXTREMITIES, UNSPECIFIED WHETHER COMPLICATED: ICD-10-CM

## 2019-09-30 DIAGNOSIS — I10 ESSENTIAL HYPERTENSION: Primary | ICD-10-CM

## 2019-09-30 DIAGNOSIS — F32.9 REACTIVE DEPRESSION: ICD-10-CM

## 2019-09-30 PROCEDURE — G0008 ADMIN INFLUENZA VIRUS VAC: HCPCS

## 2019-09-30 PROCEDURE — 99214 OFFICE O/P EST MOD 30 MIN: CPT | Performed by: INTERNAL MEDICINE

## 2019-09-30 PROCEDURE — 90662 IIV NO PRSV INCREASED AG IM: CPT

## 2019-09-30 RX ORDER — AMLODIPINE BESYLATE 2.5 MG/1
2.5 TABLET ORAL DAILY
Qty: 30 TABLET | Refills: 4
Start: 2019-09-30 | End: 2020-08-13 | Stop reason: SDUPTHER

## 2019-09-30 NOTE — ASSESSMENT & PLAN NOTE
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  3/4 of a year ago  It was recently his birthday which she found a difficult weekend  She is encouraged to continue with socialized activities to minimize time by herself  She continues to use alprazolam on an as-needed basis when she has anxiety

## 2019-09-30 NOTE — ASSESSMENT & PLAN NOTE
Episodes of anxiety along with mild depression recommend the use of alprazolam 0 25 mg twice a day no indication of dependence nor any signs of abuse

## 2019-09-30 NOTE — ASSESSMENT & PLAN NOTE
Varicosities of both lower extremities were reviewed with the patient today she does have some discomfort due to these varicosities and I prescribed compression stockings it can be used during the day when she is ambulating in active

## 2019-09-30 NOTE — PROGRESS NOTES
Assessment/Plan:    Varicose veins of both lower extremities  Varicosities of both lower extremities were reviewed with the patient today she does have some discomfort due to these varicosities and I prescribed compression stockings it can be used during the day when she is ambulating in active  Hypertension  Blood pressure today is actually on the lower side the period we have decreased her amlodipine from 5 mg a day to 2 5 mg daily she will continue on her lisinopril at 40 mg daily  She did have a mildly orthostatic response to change in posture starting at [address] seated and 100/60 standing  Reactive depression  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  3/4 of a year ago  It was recently his birthday which she found a difficult weekend  She is encouraged to continue with socialized activities to minimize time by herself  She continues to use alprazolam on an as-needed basis when she has anxiety  Anxiety  Episodes of anxiety along with mild depression recommend the use of alprazolam 0 25 mg twice a day no indication of dependence nor any signs of abuse  Diagnoses and all orders for this visit:    Essential hypertension  -     amLODIPine (NORVASC) 2 5 mg tablet; Take 1 tablet (2 5 mg total) by mouth daily    Encounter for immunization  -     influenza vaccine, 4776-8502, high-dose, PF 0 5 mL (FLUZONE HIGH-DOSE)    Varicose veins of both lower extremities, unspecified whether complicated  -     Jobst Stockings    Anxiety    Reactive depression        Subjective:      Patient ID: Trudi Colmenares is a 82 y o  female  This 82-year-old female patient returns today for follow-up visit  She sustained a fall when she tripped over a parking bump for after eating dinner out  She landed on her knees as well as her hand  She sustained no significant injuries and has recovered nicely      The patient continues to experience symptoms of depression over the loss of her  which is approximately 3/4 of a year ago  She continues to have periods of depression as well as anxiety  She was going to a support group but has a meet only in the evening she does not feel comfortable driving her car at nighttime  The following portions of the patient's history were reviewed and updated as appropriate:   She  has a past medical history of Epistaxis and Saphenous vein thrombophlebitis, right (2012)  She   Patient Active Problem List    Diagnosis Date Noted    Varicose veins of both lower extremities 09/30/2019    Fall 09/18/2019    Cough 07/16/2019    Allergy 06/12/2019    Chest wall pain 04/03/2019    Reactive depression 02/28/2019    Bunion of great toe 02/28/2019    Hammertoe of right foot 02/28/2019    Hyperlipidemia 10/31/2018    Glucose intolerance 10/31/2018    Flatulence 10/31/2018    Sleep disturbance 06/29/2018    Anxiety 10/09/2014    Fibromyalgia 10/09/2014    Hypertension 10/09/2014     She  has a past surgical history that includes Gallbladder surgery (2006)  Her family history includes Hypertension in her mother  She  reports that she has never smoked  She has never used smokeless tobacco  Her alcohol and drug histories are not on file    Current Outpatient Medications   Medication Sig Dispense Refill    ALPRAZolam (XANAX) 0 25 mg tablet Take 1 tablet (0 25 mg total) by mouth 2 (two) times a day 60 tablet 2    amLODIPine (NORVASC) 2 5 mg tablet Take 1 tablet (2 5 mg total) by mouth daily 30 tablet 4    aspirin 81 MG tablet Take 1 tablet by mouth daily      bimatoprost (LUMIGAN) 0 01 % ophthalmic drops Apply to eye      cholecalciferol (VITAMIN D3) 1,000 units tablet Take by mouth      cycloSPORINE (RESTASIS) 0 05 % ophthalmic emulsion Apply to eye      ergocalciferol (VITAMIN D2) 50,000 units Take 2,000 Units by mouth daily   5    famotidine (PEPCID) 20 mg tablet Take 1 tablet by mouth daily      fluticasone (FLONASE) 50 mcg/act nasal spray 1 spray into each nostril 2 (two) times a day 3 Bottle 3    lisinopril (ZESTRIL) 40 mg tablet TAKE 1 TABLET BY MOUTH EVERY DAY 90 tablet 3    loratadine (CLARITIN) 10 mg tablet Take 1 tablet by mouth daily      montelukast (SINGULAIR) 10 mg tablet Take 1 tablet (10 mg total) by mouth daily at bedtime 30 tablet 3    ondansetron (ZOFRAN) 4 mg tablet Take 1 tablet (4 mg total) by mouth every 8 (eight) hours as needed for nausea or vomiting 10 tablet 1    PREMARIN vaginal cream APPLY 1/4 INCH WITH FINGER TO VAGINA MONDAY AND FRIDAY AT BEDTIME  2    prochlorperazine (COMPAZINE) 10 mg tablet Take by mouth      timolol (TIMOPTIC) 0 5 % ophthalmic solution INSTILL 1 DROP INTO RIGHT EYE EVERY DAY  2    timolol (TIMOPTIC-XE) 0 5 % ophthalmic gel-forming Apply to eye      ZIOPTAN 0 0015 % ophthalmic solution ADMINISTER 1 DROP OPHTHALMICALLY AT BEDTIME IN THE RIGHT EYE, BRAND NECESSARY  6     No current facility-administered medications for this visit       Review of Systems   Musculoskeletal: Positive for arthralgias and myalgias  Psychiatric/Behavioral: Positive for dysphoric mood  The patient is nervous/anxious  All other systems reviewed and are negative  Objective:      /62   Pulse 67   Temp (!) 97 4 °F (36 3 °C)   Resp 14   Ht 5' 4" (1 626 m)   Wt 74 9 kg (165 lb 3 2 oz)   SpO2 95%   BMI 28 36 kg/m²          Physical Exam   Constitutional: She is oriented to person, place, and time  Vital signs are normal  She appears well-developed and well-nourished  She is cooperative  HENT:   Right Ear: Hearing, tympanic membrane, external ear and ear canal normal    Left Ear: Hearing, tympanic membrane, external ear and ear canal normal    Nose: Nose normal  No mucosal edema  Mouth/Throat: Uvula is midline, oropharynx is clear and moist and mucous membranes are normal    Eyes: Pupils are equal, round, and reactive to light  Conjunctivae and lids are normal    Neck: No JVD present   Carotid bruit is not present  No thyromegaly present  Cardiovascular: Normal rate, regular rhythm, normal heart sounds and intact distal pulses  No murmur heard  Pulmonary/Chest: Effort normal and breath sounds normal  No respiratory distress  Abdominal: Soft  Normal appearance and bowel sounds are normal    Musculoskeletal: Normal range of motion  She exhibits no edema  Lymphadenopathy:     She has no cervical adenopathy  Neurological: She is alert and oriented to person, place, and time  She has normal reflexes  She displays normal reflexes  Skin: Skin is warm, dry and intact  Psychiatric: She has a normal mood and affect  Her speech is normal and behavior is normal  Judgment and thought content normal  Cognition and memory are normal    Vitals reviewed

## 2019-10-11 ENCOUNTER — TELEPHONE (OUTPATIENT)
Dept: INTERNAL MEDICINE CLINIC | Facility: CLINIC | Age: 84
End: 2019-10-11

## 2019-10-11 DIAGNOSIS — R05.9 COUGH: Primary | ICD-10-CM

## 2019-10-11 RX ORDER — BENZONATATE 100 MG/1
100 CAPSULE ORAL 3 TIMES DAILY PRN
Qty: 30 CAPSULE | Refills: 1 | Status: SHIPPED | OUTPATIENT
Start: 2019-10-11 | End: 2020-05-08 | Stop reason: ALTCHOICE

## 2019-10-11 NOTE — TELEPHONE ENCOUNTER
Patient called stating she has developed a cough and feels like it is more allergies than a cold  She would like to know what you would recommend for her to take

## 2019-10-11 NOTE — TELEPHONE ENCOUNTER
Please have the patient stay on her Claritin, fluticasone, Singulair    In addition will call a prescription in to her pharmacy for Tessalon cough medication she takes 1 pill every 8 hours thank you

## 2019-10-15 ENCOUNTER — OFFICE VISIT (OUTPATIENT)
Dept: INTERNAL MEDICINE CLINIC | Facility: CLINIC | Age: 84
End: 2019-10-15
Payer: MEDICARE

## 2019-10-15 VITALS
HEIGHT: 64 IN | DIASTOLIC BLOOD PRESSURE: 68 MMHG | WEIGHT: 163 LBS | SYSTOLIC BLOOD PRESSURE: 126 MMHG | OXYGEN SATURATION: 98 % | TEMPERATURE: 97.8 F | BODY MASS INDEX: 27.83 KG/M2 | HEART RATE: 73 BPM

## 2019-10-15 DIAGNOSIS — T78.40XA ALLERGIC STATE, INITIAL ENCOUNTER: ICD-10-CM

## 2019-10-15 DIAGNOSIS — I10 ESSENTIAL HYPERTENSION: Primary | ICD-10-CM

## 2019-10-15 PROCEDURE — 99213 OFFICE O/P EST LOW 20 MIN: CPT | Performed by: INTERNAL MEDICINE

## 2019-10-15 RX ORDER — MONTELUKAST SODIUM 10 MG/1
10 TABLET ORAL
Qty: 30 TABLET | Refills: 3 | Status: SHIPPED | OUTPATIENT
Start: 2019-10-15 | End: 2020-05-08 | Stop reason: ALTCHOICE

## 2019-10-15 NOTE — PROGRESS NOTES
Assessment/Plan:    Hypertension  On the patient's previous visit she was noted to have a mild hypotension at a concerned about possible lightheadedness and fall risk we have decreased her amlodipine from 5 milligrams a day to 2 and 0 5 milligrams daily  Her follow-up visit today indicates an improvement in her blood pressure with no further orthostatic tilting  Blood pressure at a seated position as well as standing position is 126/68 with no symptoms noted plan on continuation of amlodipine at 2 and 0 5 milligrams daily along with 40 milligrams of lisinopril daily follow-up assessment of blood pressure in 4 months  Allergy  Patient presents today with concerns about persistent cough without associated fever and chills  Examination suggests that the cough is secondary to allergy symptoms with postnasal drainage  Her lungs are clear to auscultation on today's examination and she is afebrile  She is presently on Claritin and fluticasone  We previously had advised her to start on Singulair for treatment of her allergy symptoms but evidently she never picked up the prescription at the drug store  We have asked her to start to Singulair for treatment of her allergies at this time and she can also use Tessalon for control of her cough  I believe the cough is basically due to postnasal drainage if symptoms persist will be happy to re-evaluate the patient  Diagnoses and all orders for this visit:    Essential hypertension    Allergic state, initial encounter  -     montelukast (SINGULAIR) 10 mg tablet; Take 1 tablet (10 mg total) by mouth daily at bedtime        Subjective:      Patient ID: Jayna Berrios is a 82 y o  female  This 53-year-old female patient returns today for follow-up blood pressure assessment  She indicates that she has been bothered by persistent cough which is not accompanied by any significant mucus production fever or chills        The following portions of the patient's history were reviewed and updated as appropriate:   She  has a past medical history of Epistaxis and Saphenous vein thrombophlebitis, right (2012)  She   Patient Active Problem List    Diagnosis Date Noted    Varicose veins of both lower extremities 09/30/2019    Fall 09/18/2019    Cough 07/16/2019    Allergy 06/12/2019    Chest wall pain 04/03/2019    Reactive depression 02/28/2019    Bunion of great toe 02/28/2019    Hammertoe of right foot 02/28/2019    Hyperlipidemia 10/31/2018    Glucose intolerance 10/31/2018    Flatulence 10/31/2018    Sleep disturbance 06/29/2018    Anxiety 10/09/2014    Fibromyalgia 10/09/2014    Hypertension 10/09/2014     She  has a past surgical history that includes Gallbladder surgery (2006)  Her family history includes Hypertension in her mother  She  reports that she has never smoked  She has never used smokeless tobacco  Her alcohol and drug histories are not on file    Current Outpatient Medications   Medication Sig Dispense Refill    ALPRAZolam (XANAX) 0 25 mg tablet Take 1 tablet (0 25 mg total) by mouth 2 (two) times a day 60 tablet 2    amLODIPine (NORVASC) 2 5 mg tablet Take 1 tablet (2 5 mg total) by mouth daily 30 tablet 4    aspirin 81 MG tablet Take 1 tablet by mouth daily      benzonatate (TESSALON PERLES) 100 mg capsule Take 1 capsule (100 mg total) by mouth 3 (three) times a day as needed for cough 30 capsule 1    bimatoprost (LUMIGAN) 0 01 % ophthalmic drops Apply to eye      cholecalciferol (VITAMIN D3) 1,000 units tablet Take by mouth      cycloSPORINE (RESTASIS) 0 05 % ophthalmic emulsion Apply to eye      ergocalciferol (VITAMIN D2) 50,000 units Take 2,000 Units by mouth daily   5    famotidine (PEPCID) 20 mg tablet Take 1 tablet by mouth daily      fluticasone (FLONASE) 50 mcg/act nasal spray 1 spray into each nostril 2 (two) times a day 3 Bottle 3    lisinopril (ZESTRIL) 40 mg tablet TAKE 1 TABLET BY MOUTH EVERY DAY 90 tablet 3    loratadine (CLARITIN) 10 mg tablet Take 1 tablet by mouth daily      montelukast (SINGULAIR) 10 mg tablet Take 1 tablet (10 mg total) by mouth daily at bedtime 30 tablet 3    ondansetron (ZOFRAN) 4 mg tablet Take 1 tablet (4 mg total) by mouth every 8 (eight) hours as needed for nausea or vomiting 10 tablet 1    PREMARIN vaginal cream APPLY 1/4 INCH WITH FINGER TO VAGINA MONDAY AND FRIDAY AT BEDTIME  2    prochlorperazine (COMPAZINE) 10 mg tablet Take by mouth      timolol (TIMOPTIC) 0 5 % ophthalmic solution INSTILL 1 DROP INTO RIGHT EYE EVERY DAY  2    timolol (TIMOPTIC-XE) 0 5 % ophthalmic gel-forming Apply to eye      ZIOPTAN 0 0015 % ophthalmic solution ADMINISTER 1 DROP OPHTHALMICALLY AT BEDTIME IN THE RIGHT EYE, BRAND NECESSARY  6     No current facility-administered medications for this visit       Review of Systems   HENT: Positive for congestion  Respiratory: Positive for cough  Objective:      /68   Pulse 73   Temp 97 8 °F (36 6 °C) (Tympanic)   Ht 5' 4" (1 626 m)   Wt 73 9 kg (163 lb)   SpO2 98%   BMI 27 98 kg/m²          Physical Exam   Constitutional: She is oriented to person, place, and time  Vital signs are normal  She appears well-developed and well-nourished  She is cooperative  HENT:   Head: Normocephalic and atraumatic  Right Ear: Hearing, tympanic membrane, external ear and ear canal normal    Left Ear: Hearing, tympanic membrane, external ear and ear canal normal    Nose: Mucosal edema and rhinorrhea present  Mouth/Throat: Uvula is midline and mucous membranes are normal  Posterior oropharyngeal erythema present  Eyes: Pupils are equal, round, and reactive to light  Conjunctivae and lids are normal    Neck: No JVD present  Carotid bruit is not present  No thyromegaly present  Cardiovascular: Normal rate, regular rhythm, normal heart sounds and intact distal pulses  No murmur heard    Pulmonary/Chest: Effort normal and breath sounds normal  No respiratory distress  Abdominal: Normal appearance  Musculoskeletal: Normal range of motion  She exhibits no edema  Lymphadenopathy:     She has no cervical adenopathy  Neurological: She is alert and oriented to person, place, and time  She has normal reflexes  Skin: Skin is warm, dry and intact  Psychiatric: She has a normal mood and affect  Her speech is normal and behavior is normal  Judgment and thought content normal  Cognition and memory are normal    Vitals reviewed

## 2019-10-15 NOTE — ASSESSMENT & PLAN NOTE
On the patient's previous visit she was noted to have a mild hypotension at a concerned about possible lightheadedness and fall risk we have decreased her amlodipine from 5 milligrams a day to 2 and 0 5 milligrams daily  Her follow-up visit today indicates an improvement in her blood pressure with no further orthostatic tilting  Blood pressure at a seated position as well as standing position is 126/68 with no symptoms noted plan on continuation of amlodipine at 2 and 0 5 milligrams daily along with 40 milligrams of lisinopril daily follow-up assessment of blood pressure in 4 months

## 2019-10-15 NOTE — ASSESSMENT & PLAN NOTE
Patient presents today with concerns about persistent cough without associated fever and chills  Examination suggests that the cough is secondary to allergy symptoms with postnasal drainage  Her lungs are clear to auscultation on today's examination and she is afebrile  She is presently on Claritin and fluticasone  We previously had advised her to start on Singulair for treatment of her allergy symptoms but evidently she never picked up the prescription at the drug store  We have asked her to start to Singulair for treatment of her allergies at this time and she can also use Tessalon for control of her cough  I believe the cough is basically due to postnasal drainage if symptoms persist will be happy to re-evaluate the patient

## 2019-11-10 DIAGNOSIS — I10 ESSENTIAL HYPERTENSION: ICD-10-CM

## 2019-11-11 RX ORDER — AMLODIPINE BESYLATE 5 MG/1
TABLET ORAL
Qty: 30 TABLET | Refills: 5 | Status: SHIPPED | OUTPATIENT
Start: 2019-11-11 | End: 2020-08-16

## 2019-11-22 ENCOUNTER — TRANSCRIBE ORDERS (OUTPATIENT)
Dept: ADMINISTRATIVE | Facility: HOSPITAL | Age: 84
End: 2019-11-22

## 2019-11-22 DIAGNOSIS — M19.90 ARTHRITIS: ICD-10-CM

## 2019-11-22 DIAGNOSIS — M79.7 FIBROMYALGIA: Primary | ICD-10-CM

## 2019-11-24 DIAGNOSIS — T78.40XD ALLERGIC STATE, SUBSEQUENT ENCOUNTER: ICD-10-CM

## 2019-11-24 DIAGNOSIS — F41.9 ANXIETY: ICD-10-CM

## 2019-11-25 RX ORDER — FLUTICASONE PROPIONATE 50 MCG
SPRAY, SUSPENSION (ML) NASAL
Qty: 48 ML | Refills: 3 | Status: SHIPPED | OUTPATIENT
Start: 2019-11-25 | End: 2020-10-29 | Stop reason: SDUPTHER

## 2019-11-25 RX ORDER — ALPRAZOLAM 0.25 MG/1
TABLET ORAL
Qty: 60 TABLET | Refills: 2 | Status: SHIPPED | OUTPATIENT
Start: 2019-11-25 | End: 2020-05-21 | Stop reason: SDUPTHER

## 2019-12-11 DIAGNOSIS — I10 ESSENTIAL HYPERTENSION: ICD-10-CM

## 2019-12-11 RX ORDER — LISINOPRIL 40 MG/1
TABLET ORAL
Qty: 90 TABLET | Refills: 3 | Status: SHIPPED | OUTPATIENT
Start: 2019-12-11 | End: 2020-11-29

## 2020-01-09 ENCOUNTER — TELEPHONE (OUTPATIENT)
Dept: OTHER | Facility: OTHER | Age: 85
End: 2020-01-09

## 2020-01-09 NOTE — TELEPHONE ENCOUNTER
6431: VOICE MAIL MESSAGE;  6642: VOICE MAIL MESSAGE RETRIEVED: Pt is calling to cancel her appointment and is requesting a call back to reschedule

## 2020-01-17 ENCOUNTER — OFFICE VISIT (OUTPATIENT)
Dept: INTERNAL MEDICINE CLINIC | Facility: CLINIC | Age: 85
End: 2020-01-17
Payer: MEDICARE

## 2020-01-17 VITALS
DIASTOLIC BLOOD PRESSURE: 74 MMHG | TEMPERATURE: 96.8 F | RESPIRATION RATE: 14 BRPM | BODY MASS INDEX: 28.44 KG/M2 | SYSTOLIC BLOOD PRESSURE: 126 MMHG | WEIGHT: 166.6 LBS | HEIGHT: 64 IN | OXYGEN SATURATION: 91 % | HEART RATE: 84 BPM

## 2020-01-17 DIAGNOSIS — F32.9 REACTIVE DEPRESSION: ICD-10-CM

## 2020-01-17 DIAGNOSIS — E78.5 HYPERLIPIDEMIA, UNSPECIFIED HYPERLIPIDEMIA TYPE: ICD-10-CM

## 2020-01-17 DIAGNOSIS — I10 ESSENTIAL HYPERTENSION: ICD-10-CM

## 2020-01-17 DIAGNOSIS — Z13.0 SCREENING FOR DEFICIENCY ANEMIA: ICD-10-CM

## 2020-01-17 DIAGNOSIS — Z12.11 SCREENING FOR COLON CANCER: ICD-10-CM

## 2020-01-17 DIAGNOSIS — R05.9 COUGH: Primary | ICD-10-CM

## 2020-01-17 DIAGNOSIS — B35.1 NAIL FUNGUS: ICD-10-CM

## 2020-01-17 DIAGNOSIS — M21.619 BUNION OF GREAT TOE: ICD-10-CM

## 2020-01-17 PROCEDURE — 99214 OFFICE O/P EST MOD 30 MIN: CPT | Performed by: INTERNAL MEDICINE

## 2020-01-17 RX ORDER — FLUCONAZOLE 150 MG/1
TABLET ORAL
COMMUNITY
Start: 2019-12-12 | End: 2020-08-20 | Stop reason: ALTCHOICE

## 2020-01-17 RX ORDER — METRONIDAZOLE 500 MG/1
TABLET ORAL
COMMUNITY
Start: 2019-12-12 | End: 2020-05-13 | Stop reason: ALTCHOICE

## 2020-01-17 NOTE — ASSESSMENT & PLAN NOTE
Evaluation the patient's hypertension indicates good control of her hypertension with a reading 126/74  Continue on present amlodipine 2 5 mg daily

## 2020-01-17 NOTE — ASSESSMENT & PLAN NOTE
Fungal infection of the 1st toe recommend Penlac applied daily for period of 3 months  Remove all residue once a week with a cotton ball and alcohol

## 2020-01-17 NOTE — ASSESSMENT & PLAN NOTE
Bunions of both great toes of do not recommend any surgical intervention at this point  Comfortable shoes recommended to avoid pressure points

## 2020-01-17 NOTE — PROGRESS NOTES
Assessment/Plan:    Hypertension  Evaluation the patient's hypertension indicates good control of her hypertension with a reading 126/74  Continue on present amlodipine 2 5 mg daily  Nail fungus  Fungal infection of the 1st toe recommend Penlac applied daily for period of 3 months  Remove all residue once a week with a cotton ball and alcohol  Bunion of great toe  Bunions of both great toes of do not recommend any surgical intervention at this point  Comfortable shoes recommended to avoid pressure points  Reactive depression  Reactive depression secondary to the loss of the patient's  approximately 1 year ago she continues to move on in her life and healthy way  She does have occasional tearful episodes but these are short and becoming less frequent  Recommend continued counseling and continued increase in socialization    Cough  Intermittent dry cough assessed today there is no indication of any congestion in the lungs on examination  She has no evidence of sinus congestion or discharge suspect the cough is most likely due to dry air from the heat  Recommend the use of benzonatate Q 8 hours p r n  Diagnoses and all orders for this visit:    Cough    Nail fungus  -     ciclopirox (PENLAC) 8 % solution; Apply topically daily at bedtime    Essential hypertension  -     Comprehensive metabolic panel; Future  -     UA w Reflex to Microscopic w Reflex to Culture -Lab Collect    Hyperlipidemia, unspecified hyperlipidemia type  -     Lipid panel;  Future    Screening for deficiency anemia  -     CBC and differential; Future    Screening for colon cancer  -     Occult Blood, Fecal Immunochemical; Future    Bunion of great toe    Other orders  -     diclofenac sodium (VOLTAREN) 1 %; 2 g 4 (four) times a day   -     fluconazole (DIFLUCAN) 150 mg tablet; TAKE 1 TABLET BY MOUTH FOR 1 DOSE AFTER FINISHING METRONIDAZOLE  MAY REPEAT IN 1 WEEK IF NEEDED  -     metroNIDAZOLE (FLAGYL) 500 mg tablet; TAKE 1 TABLET BY MOUTH TWICE A DAY FOR 7 DAYS        Subjective:      Patient ID: Maricarmen Gutierrez is a 82 y o  female  This is a routine follow-up visit for this 29-year-old female patient  Is now proximally 1 year since her  passed away  We had a long discussion today about this process of grieving and she is showing healthy signs of starting to move on in her life  She is starting to participate in more social activities with friends going out to dinner and participating in regular exercise program at the [facility] where she lives  She still has tearful times but overall seems to be coping well  She has had a dry nonproductive cough which we will evaluate on today's visit she also has concerns about a bunion on her 1st toe of the left foot as well as ingrown toenail which appears to have some fungal growth underneath the nail  The following portions of the patient's history were reviewed and updated as appropriate:   She  has a past medical history of Epistaxis and Saphenous vein thrombophlebitis, right (2012)  She   Patient Active Problem List    Diagnosis Date Noted    Nail fungus 01/17/2020    Varicose veins of both lower extremities 09/30/2019    Fall 09/18/2019    Cough 07/16/2019    Allergy 06/12/2019    Chest wall pain 04/03/2019    Reactive depression 02/28/2019    Bunion of great toe 02/28/2019    Hammertoe of right foot 02/28/2019    Hyperlipidemia 10/31/2018    Glucose intolerance 10/31/2018    Flatulence 10/31/2018    Sleep disturbance 06/29/2018    Anxiety 10/09/2014    Fibromyalgia 10/09/2014    Hypertension 10/09/2014     She  has a past surgical history that includes Gallbladder surgery (2006)  Her family history includes Hypertension in her mother  She  reports that she has never smoked  She has never used smokeless tobacco  Her alcohol and drug histories are not on file    Current Outpatient Medications   Medication Sig Dispense Refill    ALPRAZolam [place]) 0 25 mg tablet Take 1 tablet (0 25 mg total) by mouth 2 (two) times a day 60 tablet 2    ALPRAZolam (XANAX) 0 25 mg tablet TAKE 1 TABLET BY MOUTH TWICE A DAY 60 tablet 2    amLODIPine (NORVASC) 2 5 mg tablet Take 1 tablet (2 5 mg total) by mouth daily 30 tablet 4    amLODIPine (NORVASC) 5 mg tablet TAKE 1 TABLET BY MOUTH EVERY DAY 30 tablet 5    aspirin 81 MG tablet Take 1 tablet by mouth daily      benzonatate (TESSALON PERLES) 100 mg capsule Take 1 capsule (100 mg total) by mouth 3 (three) times a day as needed for cough 30 capsule 1    bimatoprost (LUMIGAN) 0 01 % ophthalmic drops Apply to eye      cholecalciferol (VITAMIN D3) 1,000 units tablet Take by mouth      cycloSPORINE (RESTASIS) 0 05 % ophthalmic emulsion Apply to eye      diclofenac sodium (VOLTAREN) 1 % 2 g 4 (four) times a day   2    ergocalciferol (VITAMIN D2) 50,000 units Take 2,000 Units by mouth daily   5    famotidine (PEPCID) 20 mg tablet Take 1 tablet by mouth daily      fluconazole (DIFLUCAN) 150 mg tablet TAKE 1 TABLET BY MOUTH FOR 1 DOSE AFTER FINISHING METRONIDAZOLE  MAY REPEAT IN 1 WEEK IF NEEDED      fluticasone (FLONASE) 50 mcg/act nasal spray SPRAY 1 SPRAY INTO EACH NOSTRIL TWICE A DAY 48 mL 3    lisinopril (ZESTRIL) 40 mg tablet TAKE 1 TABLET BY MOUTH EVERY DAY 90 tablet 3    loratadine (CLARITIN) 10 mg tablet Take 1 tablet by mouth daily      metroNIDAZOLE (FLAGYL) 500 mg tablet TAKE 1 TABLET BY MOUTH TWICE A DAY FOR 7 DAYS      montelukast (SINGULAIR) 10 mg tablet Take 1 tablet (10 mg total) by mouth daily at bedtime 30 tablet 3    ondansetron (ZOFRAN) 4 mg tablet Take 1 tablet (4 mg total) by mouth every 8 (eight) hours as needed for nausea or vomiting 10 tablet 1    PREMARIN vaginal cream APPLY 1/4 INCH WITH FINGER TO VAGINA MONDAY AND FRIDAY AT BEDTIME  2    prochlorperazine (COMPAZINE) 10 mg tablet Take by mouth      timolol (TIMOPTIC) 0 5 % ophthalmic solution INSTILL 1 DROP INTO RIGHT EYE EVERY DAY 2    timolol (TIMOPTIC-XE) 0 5 % ophthalmic gel-forming Apply to eye      ZIOPTAN 0 0015 % ophthalmic solution ADMINISTER 1 DROP OPHTHALMICALLY AT BEDTIME IN THE RIGHT EYE, BRAND NECESSARY  6    ciclopirox (PENLAC) 8 % solution Apply topically daily at bedtime 6 6 mL 0     No current facility-administered medications for this visit       Review of Systems   Respiratory: Positive for cough  Musculoskeletal: Positive for arthralgias and joint swelling  Psychiatric/Behavioral: Positive for dysphoric mood  All other systems reviewed and are negative  Objective:      /74   Pulse 84   Temp (!) 96 8 °F (36 °C)   Resp 14   Ht 5' 4" (1 626 m)   Wt 75 6 kg (166 lb 9 6 oz)   SpO2 91%   BMI 28 60 kg/m²          Physical Exam   Constitutional: She is oriented to person, place, and time  Vital signs are normal  She appears well-developed and well-nourished  She is cooperative  HENT:   Right Ear: Hearing, tympanic membrane, external ear and ear canal normal    Left Ear: Hearing, tympanic membrane, external ear and ear canal normal    Nose: Nose normal  No mucosal edema  Mouth/Throat: Uvula is midline, oropharynx is clear and moist and mucous membranes are normal    Eyes: Pupils are equal, round, and reactive to light  Conjunctivae and lids are normal    Neck: No JVD present  Carotid bruit is not present  No thyromegaly present  Cardiovascular: Normal rate, regular rhythm, normal heart sounds and intact distal pulses  No murmur heard  Pulmonary/Chest: Effort normal and breath sounds normal  No stridor  No respiratory distress  She has no wheezes  Examination of the lungs reveals no abnormality   Abdominal: Soft  Normal appearance and bowel sounds are normal    Musculoskeletal: Normal range of motion  She exhibits no edema  Arthritic changes to the 1st toe of both feet consistent with bunion formation  Lymphadenopathy:     She has no cervical adenopathy     Neurological: She is alert and oriented to person, place, and time  She has normal reflexes  She displays normal reflexes  Skin: Skin is warm, dry and intact  Fungal infection of the toenail of the 1st toe   Psychiatric: She has a normal mood and affect  Her speech is normal and behavior is normal  Judgment and thought content normal  Cognition and memory are normal    Vitals reviewed

## 2020-01-17 NOTE — ASSESSMENT & PLAN NOTE
Intermittent dry cough assessed today there is no indication of any congestion in the lungs on examination  She has no evidence of sinus congestion or discharge suspect the cough is most likely due to dry air from the heat  Recommend the use of benzonatate Q 8 hours p r n

## 2020-01-17 NOTE — ASSESSMENT & PLAN NOTE
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

## 2020-01-22 ENCOUNTER — TELEPHONE (OUTPATIENT)
Dept: INTERNAL MEDICINE CLINIC | Facility: CLINIC | Age: 85
End: 2020-01-22

## 2020-01-22 DIAGNOSIS — J01.00 ACUTE NON-RECURRENT MAXILLARY SINUSITIS: ICD-10-CM

## 2020-01-22 RX ORDER — AZITHROMYCIN 250 MG/1
TABLET, FILM COATED ORAL
Qty: 6 TABLET | Refills: 0 | Status: SHIPPED | OUTPATIENT
Start: 2020-01-22 | End: 2020-01-26

## 2020-01-22 NOTE — PROGRESS NOTES
Patient called the she is still coughing a lot concerned about the persistent cough has some congestion in her sinuses  The cough produces no mucus from her chest her sinus mucus is clear  She is not sure if she is running any fever but she does feel tired and slightly achy  Will put her on a 5 day course of Zithromax to see if it helps and encouraged her to get extra rest and continue on the benzonatate 100 mg q 8 hours for control the cough

## 2020-01-22 NOTE — TELEPHONE ENCOUNTER
Call returned to the patient we discussed her symptoms and decided to start her on a 5 day Zithromax pack and continue on the Tessalon Perles 100 mg q 8 hours follow-up if symptoms do not improve

## 2020-01-31 ENCOUNTER — TELEPHONE (OUTPATIENT)
Dept: INTERNAL MEDICINE CLINIC | Facility: CLINIC | Age: 85
End: 2020-01-31

## 2020-01-31 NOTE — TELEPHONE ENCOUNTER
Call returned to the patient she did not answer left a message on her voicemail to give us a call for visit in the office next week  She can use Robitussin DM over the weekend    I think she may need a chest x-ray after we see her next week

## 2020-01-31 NOTE — TELEPHONE ENCOUNTER
Pt says that she finished antibiotic prescribed by Dr Baeza last week and is still coughing and congested  Pt would appreciate it if Dr Baeza could give her a call back today with an over the counter medication recommendation? Pt can be reached back at [phone]  Thank you!

## 2020-02-04 ENCOUNTER — OFFICE VISIT (OUTPATIENT)
Dept: INTERNAL MEDICINE CLINIC | Facility: CLINIC | Age: 85
End: 2020-02-04
Payer: MEDICARE

## 2020-02-04 VITALS
TEMPERATURE: 97.6 F | RESPIRATION RATE: 16 BRPM | BODY MASS INDEX: 28.51 KG/M2 | DIASTOLIC BLOOD PRESSURE: 68 MMHG | HEART RATE: 72 BPM | SYSTOLIC BLOOD PRESSURE: 124 MMHG | WEIGHT: 167 LBS | OXYGEN SATURATION: 98 % | HEIGHT: 64 IN

## 2020-02-04 DIAGNOSIS — R05.9 COUGH: Primary | ICD-10-CM

## 2020-02-04 PROCEDURE — 1036F TOBACCO NON-USER: CPT | Performed by: NURSE PRACTITIONER

## 2020-02-04 PROCEDURE — 99213 OFFICE O/P EST LOW 20 MIN: CPT | Performed by: NURSE PRACTITIONER

## 2020-02-04 PROCEDURE — 3074F SYST BP LT 130 MM HG: CPT | Performed by: NURSE PRACTITIONER

## 2020-02-04 PROCEDURE — 3078F DIAST BP <80 MM HG: CPT | Performed by: NURSE PRACTITIONER

## 2020-02-04 PROCEDURE — 1160F RVW MEDS BY RX/DR IN RCRD: CPT | Performed by: NURSE PRACTITIONER

## 2020-02-04 PROCEDURE — 3008F BODY MASS INDEX DOCD: CPT | Performed by: NURSE PRACTITIONER

## 2020-02-04 PROCEDURE — 4040F PNEUMOC VAC/ADMIN/RCVD: CPT | Performed by: NURSE PRACTITIONER

## 2020-02-04 NOTE — PROGRESS NOTES
Assessment/Plan:    Cough  Cough not responsive to treatment with abx, benzonatate, or robitussin  Cough is non-productive, lungs clear, vitals stable  Two possible factors contributing including allergic rhinitis and/or GERD, especially considering sx worse overnight  We discussed 2 further treatment options  She will first try flonase daily in addition to claritin and saline spray to see if nasal sx improve and help her cough  If not effective, next option to change from pepcid to priolosec OTC daily  Pt instructed to call for reevaluation if sx worsen or persist          Diagnoses and all orders for this visit:    Cough          Subjective:      Patient ID: Merissa Kilpatrick is a 82 y o  female  Pt is a 82 y o  y/o female who is seen today for evaluation of cough  She was treated on 1/22 with azithromycin after calling the office to c/o a persistent cough  She completed treatment and she felt the cough did slightly improve but has not gone away  Prior to starting the antibiotic, she was using tessalon which was not helping and since completing the antibiotic she has been using robitussin  She denies fever/chills, sputum production, shortness of breath  She states her cough is worse at night, sometimes waking her from sleep  She has a history of GERD, she takes pepcid daily as well as using align daily  The following portions of the patient's history were reviewed and updated as appropriate: allergies, current medications, past family history, past medical history, past social history, past surgical history and problem list     Review of Systems   Constitutional: Negative for appetite change, chills, fatigue and fever  HENT: Positive for congestion  Negative for ear pain, postnasal drip, sinus pressure and sore throat (irritation from coughing)  Respiratory: Positive for cough  Negative for chest tightness, shortness of breath and wheezing      Cardiovascular: Negative for chest pain and palpitations  Gastrointestinal: Negative for diarrhea, nausea and vomiting  Genitourinary: Negative for dysuria  Neurological: Negative for dizziness and headaches  Hematological: Negative for adenopathy  Psychiatric/Behavioral: Positive for sleep disturbance  Objective:      /68   Pulse 72   Temp 97 6 °F (36 4 °C)   Resp 16   Ht 5' 4" (1 626 m)   Wt 75 8 kg (167 lb)   SpO2 98%   BMI 28 67 kg/m²          Physical Exam   Constitutional: She is oriented to person, place, and time  Vital signs are normal  She appears well-developed and well-nourished  She is cooperative  HENT:   Right Ear: Hearing, tympanic membrane, external ear and ear canal normal  Tympanic membrane is not bulging  No middle ear effusion  Left Ear: Hearing, tympanic membrane, external ear and ear canal normal  Tympanic membrane is not bulging  No middle ear effusion  Nose: Mucosal edema present  No rhinorrhea  Mouth/Throat: Mucous membranes are not dry  No posterior oropharyngeal erythema  Swelling/redness consistent with allergic rhinitis; unable to visualize the posterior oropharynx  Eyes: Conjunctivae are normal    Cardiovascular: Normal rate, regular rhythm, normal heart sounds and intact distal pulses  No murmur heard  Pulmonary/Chest: Effort normal and breath sounds normal  No accessory muscle usage  No respiratory distress  She has no decreased breath sounds  She has no wheezes  She has no rhonchi  She has no rales  Musculoskeletal: She exhibits no edema  Lymphadenopathy:        Head (right side): No submental, no submandibular, no tonsillar, no preauricular, no posterior auricular and no occipital adenopathy present  Head (left side): No submental, no submandibular, no tonsillar, no preauricular, no posterior auricular and no occipital adenopathy present  She has no cervical adenopathy  Neurological: She is alert and oriented to person, place, and time     Skin: Skin is warm, dry and intact  Psychiatric: She has a normal mood and affect  Her speech is normal and behavior is normal  Judgment and thought content normal  Cognition and memory are normal    Vitals reviewed

## 2020-02-04 NOTE — ASSESSMENT & PLAN NOTE
Cough not responsive to treatment with abx, benzonatate, or robitussin  Cough is non-productive, lungs clear, vitals stable  Two possible factors contributing including allergic rhinitis and/or GERD, especially considering sx worse overnight  We discussed 2 further treatment options  She will first try flonase daily in addition to claritin and saline spray to see if nasal sx improve and help her cough  If not effective, next option to change from pepcid to priolosec OTC daily    Pt instructed to call for reevaluation if sx worsen or persist

## 2020-02-25 DIAGNOSIS — F41.9 ANXIETY: ICD-10-CM

## 2020-02-25 RX ORDER — ALPRAZOLAM 0.25 MG/1
0.25 TABLET ORAL 2 TIMES DAILY
Qty: 60 TABLET | Refills: 0 | Status: SHIPPED | OUTPATIENT
Start: 2020-02-25 | End: 2020-03-26 | Stop reason: SDUPTHER

## 2020-03-09 ENCOUNTER — TRANSCRIBE ORDERS (OUTPATIENT)
Dept: ADMINISTRATIVE | Facility: HOSPITAL | Age: 85
End: 2020-03-09

## 2020-03-09 DIAGNOSIS — N95.0 POST-MENOPAUSAL BLEEDING: Primary | ICD-10-CM

## 2020-03-18 ENCOUNTER — OFFICE VISIT (OUTPATIENT)
Dept: INTERNAL MEDICINE CLINIC | Facility: CLINIC | Age: 85
End: 2020-03-18
Payer: MEDICARE

## 2020-03-18 VITALS
DIASTOLIC BLOOD PRESSURE: 76 MMHG | SYSTOLIC BLOOD PRESSURE: 128 MMHG | TEMPERATURE: 97.6 F | HEART RATE: 83 BPM | OXYGEN SATURATION: 98 %

## 2020-03-18 DIAGNOSIS — R39.9 UTI SYMPTOMS: Primary | ICD-10-CM

## 2020-03-18 DIAGNOSIS — I10 ESSENTIAL HYPERTENSION: ICD-10-CM

## 2020-03-18 LAB
SL AMB  POCT GLUCOSE, UA: ABNORMAL
SL AMB LEUKOCYTE ESTERASE,UA: ABNORMAL
SL AMB POCT BILIRUBIN,UA: ABNORMAL
SL AMB POCT BLOOD,UA: ABNORMAL
SL AMB POCT CLARITY,UA: ABNORMAL
SL AMB POCT COLOR,UA: YELLOW
SL AMB POCT KETONES,UA: ABNORMAL
SL AMB POCT NITRITE,UA: ABNORMAL
SL AMB POCT PH,UA: 5
SL AMB POCT SPECIFIC GRAVITY,UA: 1.01
SL AMB POCT URINE PROTEIN: ABNORMAL
SL AMB POCT UROBILINOGEN: ABNORMAL

## 2020-03-18 PROCEDURE — 4040F PNEUMOC VAC/ADMIN/RCVD: CPT | Performed by: INTERNAL MEDICINE

## 2020-03-18 PROCEDURE — 3078F DIAST BP <80 MM HG: CPT | Performed by: INTERNAL MEDICINE

## 2020-03-18 PROCEDURE — 3074F SYST BP LT 130 MM HG: CPT | Performed by: INTERNAL MEDICINE

## 2020-03-18 PROCEDURE — 1160F RVW MEDS BY RX/DR IN RCRD: CPT | Performed by: INTERNAL MEDICINE

## 2020-03-18 PROCEDURE — 81002 URINALYSIS NONAUTO W/O SCOPE: CPT | Performed by: INTERNAL MEDICINE

## 2020-03-18 PROCEDURE — 1036F TOBACCO NON-USER: CPT | Performed by: INTERNAL MEDICINE

## 2020-03-18 PROCEDURE — 99213 OFFICE O/P EST LOW 20 MIN: CPT | Performed by: INTERNAL MEDICINE

## 2020-03-18 RX ORDER — AMOXICILLIN 500 MG/1
500 CAPSULE ORAL EVERY 8 HOURS SCHEDULED
Qty: 21 CAPSULE | Refills: 0 | Status: SHIPPED | OUTPATIENT
Start: 2020-03-18 | End: 2020-03-24

## 2020-03-18 NOTE — ASSESSMENT & PLAN NOTE
assessment of the patient's hypertension today indicates adequate control of her blood pressure recommend that she continue on lisinopril 40 mg daily for management of her hypertension  She has no apparent side effects of the medication nor any symptoms to indicate uncontrolled hypertension or hypotensive episodes

## 2020-03-18 NOTE — PROGRESS NOTES
Assessment/Plan:    Hypertension  assessment of the patient's hypertension today indicates adequate control of her blood pressure recommend that she continue on lisinopril 40 mg daily for management of her hypertension  She has no apparent side effects of the medication nor any symptoms to indicate uncontrolled hypertension or hypotensive episodes  UTI symptoms  Assessment of the patient's chills symptoms included a dipstick urine today it is positive for leukocytes nitrites and red blood cells  Unfortunately she is unable to give us a significant enough volume of urine to send on for culturing  Based upon her symptoms and the findings on this dipstick of her urine I will start her on antibiotic therapy  She is allergic to Cipro nitrofurantoin and sulfur based antibiotics  Will start her on amoxicillin of 500 mg 3 times a day for duration of 7 days  I have advised her to drink extra fluids to try to keep her bladder flushed of follow-up if symptoms persist        Diagnoses and all orders for this visit:    UTI symptoms  -     POCT urine dip  -     amoxicillin (AMOXIL) 500 mg capsule; Take 1 capsule (500 mg total) by mouth every 8 (eight) hours for 7 days        Subjective:      Patient ID: Rachel Zheng is a 82 y o  female  Female patient presents today for evaluation of intermittent chills  She has had these over the past several days  She has been under the care of her urogynecologist with a recent course of Flagyl 500 mg twice a day for 7 days  The patient has been working with her urogynecologist with the use of a pessary  She has noted some occasional spotting vaginally  She has had no documented temperature elevation but does have these intermittent chills  She denies any cough of sinus congestion sore throat myalgias or arthralgias  She has had no GI symptoms of nausea vomiting or diarrhea    She does not note any change in the color or clarity of her urine nor does she seem to have any dysuria type of symptoms  She has not traveled recently and has had no known contacts with anybody that has an active flu case  The following portions of the patient's history were reviewed and updated as appropriate:   She  has a past medical history of Epistaxis and Saphenous vein thrombophlebitis, right (2012)  She   Patient Active Problem List    Diagnosis Date Noted    UTI symptoms 03/18/2020    Nail fungus 01/17/2020    Varicose veins of both lower extremities 09/30/2019    Fall 09/18/2019    Cough 07/16/2019    Allergy 06/12/2019    Chest wall pain 04/03/2019    Reactive depression 02/28/2019    Bunion of great toe 02/28/2019    Hammertoe of right foot 02/28/2019    Hyperlipidemia 10/31/2018    Glucose intolerance 10/31/2018    Flatulence 10/31/2018    Sleep disturbance 06/29/2018    Anxiety 10/09/2014    Fibromyalgia 10/09/2014    Hypertension 10/09/2014     She  has a past surgical history that includes Gallbladder surgery (2006)  Her family history includes Hypertension in her mother  She  reports that she has never smoked  She has never used smokeless tobacco  Her alcohol and drug histories are not on file    Current Outpatient Medications   Medication Sig Dispense Refill    ALPRAZolam (XANAX) 0 25 mg tablet TAKE 1 TABLET BY MOUTH TWICE A DAY 60 tablet 2    ALPRAZolam (XANAX) 0 25 mg tablet Take 1 tablet (0 25 mg total) by mouth 2 (two) times a day 60 tablet 0    amLODIPine (NORVASC) 2 5 mg tablet Take 1 tablet (2 5 mg total) by mouth daily 30 tablet 4    amLODIPine (NORVASC) 5 mg tablet TAKE 1 TABLET BY MOUTH EVERY DAY 30 tablet 5    amoxicillin (AMOXIL) 500 mg capsule Take 1 capsule (500 mg total) by mouth every 8 (eight) hours for 7 days 21 capsule 0    aspirin 81 MG tablet Take 1 tablet by mouth daily      benzonatate (TESSALON PERLES) 100 mg capsule Take 1 capsule (100 mg total) by mouth 3 (three) times a day as needed for cough 30 capsule 1    bimatoprost (LUMIGAN) 0 01 % ophthalmic drops Apply to eye      cholecalciferol (VITAMIN D3) 1,000 units tablet Take by mouth      ciclopirox (PENLAC) 8 % solution Apply topically daily at bedtime 6 6 mL 0    cycloSPORINE (RESTASIS) 0 05 % ophthalmic emulsion Apply to eye      diclofenac sodium (VOLTAREN) 1 % 2 g 4 (four) times a day   2    ergocalciferol (VITAMIN D2) 50,000 units Take 2,000 Units by mouth daily   5    famotidine (PEPCID) 20 mg tablet Take 1 tablet by mouth daily      fluconazole (DIFLUCAN) 150 mg tablet TAKE 1 TABLET BY MOUTH FOR 1 DOSE AFTER FINISHING METRONIDAZOLE  MAY REPEAT IN 1 WEEK IF NEEDED      fluticasone (FLONASE) 50 mcg/act nasal spray SPRAY 1 SPRAY INTO EACH NOSTRIL TWICE A DAY 48 mL 3    lisinopril (ZESTRIL) 40 mg tablet TAKE 1 TABLET BY MOUTH EVERY DAY 90 tablet 3    loratadine (CLARITIN) 10 mg tablet Take 1 tablet by mouth daily      metroNIDAZOLE (FLAGYL) 500 mg tablet TAKE 1 TABLET BY MOUTH TWICE A DAY FOR 7 DAYS      montelukast (SINGULAIR) 10 mg tablet Take 1 tablet (10 mg total) by mouth daily at bedtime 30 tablet 3    ondansetron (ZOFRAN) 4 mg tablet Take 1 tablet (4 mg total) by mouth every 8 (eight) hours as needed for nausea or vomiting 10 tablet 1    PREMARIN vaginal cream APPLY 1/4 INCH WITH FINGER TO VAGINA MONDAY AND FRIDAY AT BEDTIME  2    prochlorperazine (COMPAZINE) 10 mg tablet Take by mouth      timolol (TIMOPTIC) 0 5 % ophthalmic solution INSTILL 1 DROP INTO RIGHT EYE EVERY DAY  2    timolol (TIMOPTIC-XE) 0 5 % ophthalmic gel-forming Apply to eye      ZIOPTAN 0 0015 % ophthalmic solution ADMINISTER 1 DROP OPHTHALMICALLY AT BEDTIME IN THE RIGHT EYE, BRAND NECESSARY  6     No current facility-administered medications for this visit       Review of Systems   Constitutional: Positive for chills  All other systems reviewed and are negative          Objective:      /76   Pulse 83   Temp 97 6 °F (36 4 °C)   SpO2 98%          Physical Exam Constitutional: She is oriented to person, place, and time  Vital signs are normal  She appears well-developed and well-nourished  She is cooperative  HENT:   Right Ear: Hearing, tympanic membrane, external ear and ear canal normal    Left Ear: Hearing, tympanic membrane, external ear and ear canal normal    Nose: Nose normal  No mucosal edema  Mouth/Throat: Uvula is midline, oropharynx is clear and moist and mucous membranes are normal    Eyes: Pupils are equal, round, and reactive to light  Conjunctivae and lids are normal    Neck: No JVD present  Carotid bruit is not present  No thyromegaly present  Cardiovascular: Normal rate, regular rhythm, normal heart sounds and intact distal pulses  No murmur heard  Pulmonary/Chest: Effort normal and breath sounds normal  No stridor  No respiratory distress  She has no wheezes  She has no rales  Abdominal: Soft  Normal appearance and bowel sounds are normal    Musculoskeletal: Normal range of motion  She exhibits no edema  Lymphadenopathy:     She has no cervical adenopathy  Neurological: She is alert and oriented to person, place, and time  She has normal reflexes  She displays normal reflexes  Skin: Skin is warm, dry and intact  Psychiatric: She has a normal mood and affect  Her speech is normal and behavior is normal  Judgment and thought content normal  Cognition and memory are normal    Vitals reviewed

## 2020-03-18 NOTE — ASSESSMENT & PLAN NOTE
Assessment of the patient's chills symptoms included a dipstick urine today it is positive for leukocytes nitrites and red blood cells  Unfortunately she is unable to give us a significant enough volume of urine to send on for culturing  Based upon her symptoms and the findings on this dipstick of her urine I will start her on antibiotic therapy  She is allergic to Cipro nitrofurantoin and sulfur based antibiotics  Will start her on amoxicillin of 500 mg 3 times a day for duration of 7 days    I have advised her to drink extra fluids to try to keep her bladder flushed of follow-up if symptoms persist

## 2020-03-24 ENCOUNTER — TELEMEDICINE (OUTPATIENT)
Dept: INTERNAL MEDICINE CLINIC | Facility: CLINIC | Age: 85
End: 2020-03-24
Payer: MEDICARE

## 2020-03-24 DIAGNOSIS — R39.9 UTI SYMPTOMS: Primary | ICD-10-CM

## 2020-03-24 PROCEDURE — G2012 BRIEF CHECK IN BY MD/QHP: HCPCS | Performed by: INTERNAL MEDICINE

## 2020-03-24 RX ORDER — CEPHALEXIN 500 MG/1
500 CAPSULE ORAL EVERY 12 HOURS SCHEDULED
Qty: 14 CAPSULE | Refills: 0 | Status: SHIPPED | OUTPATIENT
Start: 2020-03-24 | End: 2020-03-31 | Stop reason: SINTOL

## 2020-03-24 NOTE — PROGRESS NOTES
Virtual Regular Visit    Problem List Items Addressed This Visit        Other    UTI symptoms - Primary     The patient the continues to have chills symptoms  At the time of her last visit with us she had a urinalysis which was consistent with a UTI  She was unable to provide us with enough urine to send off for culturing  She was started on amoxicillin which may not be sufficient to cure this infection and based upon a persistence of her symptoms today we have recommended changed to Keflex 500 mg twice a day for duration of 7 days  She is encouraged to get extra rest maintain good nutrition and hydration and report any continuation of her symptoms  Relevant Medications    cephalexin (KEFLEX) 500 mg capsule               Reason for visit is persistent chills and UTI symptoms    Encounter provider Mil Lee MD    Provider located at [address] [id number]      Recent Visits  Date Type Provider Dept   03/18/20 Office Visit Mil Lee MD [facility] Internal Med   Showing recent visits within past 7 days and meeting all other requirements     Future Appointments  No visits were found meeting these conditions  Showing future appointments within next 150 days and meeting all other requirements        After connecting through [employer], the patient was identified by name and date of birth  Efrem Ventura was informed that this is a telemedicine visit and that the visit is being conducted through telephone which may not be secure and therefore, might not be HIPAA-compliant  My office door was closed  No one else was in the room  She acknowledged consent and understanding of privacy and security of the video platform  The patient has agreed to participate and understands they can discontinue the visit at any time      Subjective  Efrem Ventura is a 82 y o  female who presents today for virtual telephonic visit with her consent  She has had persistent symptoms of frequency of urination and chills  During her last visit with us in the office she did have a urinalysis which was consistent with a UTI  She was unable to give us enough urine to send off for culturing  She was placed on amoxicillin but this has not been successful at reversing her symptoms  I recommend that she change to Keflex 500 mg twice a day for 7 days with follow-up if her symptoms fail to resolve         Past Medical History:   Diagnosis Date    Epistaxis     RESOLVED: 2/18/17    Saphenous vein thrombophlebitis, right 2012       Past Surgical History:   Procedure Laterality Date    GALLBLADDER SURGERY  2006       Current Outpatient Medications   Medication Sig Dispense Refill    ALPRAZolam (XANAX) 0 25 mg tablet TAKE 1 TABLET BY MOUTH TWICE A DAY 60 tablet 2    ALPRAZolam (XANAX) 0 25 mg tablet Take 1 tablet (0 25 mg total) by mouth 2 (two) times a day 60 tablet 0    amLODIPine (NORVASC) 2 5 mg tablet Take 1 tablet (2 5 mg total) by mouth daily 30 tablet 4    amLODIPine (NORVASC) 5 mg tablet TAKE 1 TABLET BY MOUTH EVERY DAY 30 tablet 5    aspirin 81 MG tablet Take 1 tablet by mouth daily      benzonatate (TESSALON PERLES) 100 mg capsule Take 1 capsule (100 mg total) by mouth 3 (three) times a day as needed for cough 30 capsule 1    bimatoprost (LUMIGAN) 0 01 % ophthalmic drops Apply to eye      cephalexin (KEFLEX) 500 mg capsule Take 1 capsule (500 mg total) by mouth every 12 (twelve) hours for 7 days 14 capsule 0    cholecalciferol (VITAMIN D3) 1,000 units tablet Take by mouth      ciclopirox (PENLAC) 8 % solution Apply topically daily at bedtime 6 6 mL 0    cycloSPORINE (RESTASIS) 0 05 % ophthalmic emulsion Apply to eye      diclofenac sodium (VOLTAREN) 1 % 2 g 4 (four) times a day   2    ergocalciferol (VITAMIN D2) 50,000 units Take 2,000 Units by mouth daily   5    famotidine (PEPCID) 20 mg tablet Take 1 tablet by mouth daily      fluconazole (DIFLUCAN) 150 mg tablet TAKE 1 TABLET BY MOUTH FOR 1 DOSE AFTER FINISHING METRONIDAZOLE  MAY REPEAT IN 1 WEEK IF NEEDED      fluticasone (FLONASE) 50 mcg/act nasal spray SPRAY 1 SPRAY INTO EACH NOSTRIL TWICE A DAY 48 mL 3    lisinopril (ZESTRIL) 40 mg tablet TAKE 1 TABLET BY MOUTH EVERY DAY 90 tablet 3    loratadine (CLARITIN) 10 mg tablet Take 1 tablet by mouth daily      metroNIDAZOLE (FLAGYL) 500 mg tablet TAKE 1 TABLET BY MOUTH TWICE A DAY FOR 7 DAYS      montelukast (SINGULAIR) 10 mg tablet Take 1 tablet (10 mg total) by mouth daily at bedtime 30 tablet 3    ondansetron (ZOFRAN) 4 mg tablet Take 1 tablet (4 mg total) by mouth every 8 (eight) hours as needed for nausea or vomiting 10 tablet 1    PREMARIN vaginal cream APPLY 1/4 INCH WITH FINGER TO VAGINA MONDAY AND FRIDAY AT BEDTIME  2    prochlorperazine (COMPAZINE) 10 mg tablet Take by mouth      timolol (TIMOPTIC) 0 5 % ophthalmic solution INSTILL 1 DROP INTO RIGHT EYE EVERY DAY  2    timolol (TIMOPTIC-XE) 0 5 % ophthalmic gel-forming Apply to eye      ZIOPTAN 0 0015 % ophthalmic solution ADMINISTER 1 DROP OPHTHALMICALLY AT BEDTIME IN THE RIGHT EYE, BRAND NECESSARY  6     No current facility-administered medications for this visit  Allergies   Allergen Reactions    Acetazolamide     Ciprofloxacin Headache and Nausea Only    Nitrofurantoin     Pollen Extract     Sulfa Antibiotics        Review of Systems frequency of urination burning on urination      I spent 15 minutes with the patient during this visit

## 2020-03-24 NOTE — ASSESSMENT & PLAN NOTE
The patient the continues to have chills symptoms  At the time of her last visit with us she had a urinalysis which was consistent with a UTI  She was unable to provide us with enough urine to send off for culturing  She was started on amoxicillin which may not be sufficient to cure this infection and based upon a persistence of her symptoms today we have recommended changed to Keflex 500 mg twice a day for duration of 7 days  She is encouraged to get extra rest maintain good nutrition and hydration and report any continuation of her symptoms

## 2020-03-26 ENCOUNTER — TELEMEDICINE (OUTPATIENT)
Dept: INTERNAL MEDICINE CLINIC | Facility: CLINIC | Age: 85
End: 2020-03-26
Payer: MEDICARE

## 2020-03-26 ENCOUNTER — TELEPHONE (OUTPATIENT)
Dept: INTERNAL MEDICINE CLINIC | Facility: CLINIC | Age: 85
End: 2020-03-26

## 2020-03-26 DIAGNOSIS — R39.9 UTI SYMPTOMS: Primary | ICD-10-CM

## 2020-03-26 DIAGNOSIS — B35.1 NAIL FUNGUS: ICD-10-CM

## 2020-03-26 DIAGNOSIS — R68.89 SOMATIC COMPLAINTS, MULTIPLE: ICD-10-CM

## 2020-03-26 DIAGNOSIS — F41.9 ANXIETY: ICD-10-CM

## 2020-03-26 PROCEDURE — 99442 PR PHYS/QHP TELEPHONE EVALUATION 11-20 MIN: CPT | Performed by: INTERNAL MEDICINE

## 2020-03-26 RX ORDER — ALPRAZOLAM 0.25 MG/1
0.25 TABLET ORAL 2 TIMES DAILY
Qty: 60 TABLET | Refills: 0 | Status: SHIPPED | OUTPATIENT
Start: 2020-03-26 | End: 2020-04-23

## 2020-03-26 NOTE — ASSESSMENT & PLAN NOTE
Multiple somatic complaints such as nausea of headache and pruritus of the skin  She denies any active rash to indicate a reaction to the Keflex  I indicated is possible that she may have sensitivity to the Keflex and causing the headache and nausea as well will discontinue the antibiotic as she has no ongoing symptoms of UTI at this time I expect it may take up to 48 hours for the Keflex to clear and will see how she feels after that time period rest hydration and good nutrition are recommended in the meantime  If her condition worsen she is encouraged to call us for follow-up  She has no high fever cough chest pain shortness of breath to indicate an active corona virus infection

## 2020-03-26 NOTE — PROGRESS NOTES
Virtual Regular Visit    Problem List Items Addressed This Visit        Other    UTI symptoms - Primary     At this time there are no ongoing urinary tract infections symptoms so I have recommended discontinuing the patient's Keflex  Somatic complaints, multiple     Multiple somatic complaints such as nausea of headache and pruritus of the skin  She denies any active rash to indicate a reaction to the Keflex  I indicated is possible that she may have sensitivity to the Keflex and causing the headache and nausea as well will discontinue the antibiotic as she has no ongoing symptoms of UTI at this time I expect it may take up to 48 hours for the Keflex to clear and will see how she feels after that time period rest hydration and good nutrition are recommended in the meantime  If her condition worsen she is encouraged to call us for follow-up  She has no high fever cough chest pain shortness of breath to indicate an active corona virus infection  Reason for visit is this is a follow-up visit perform telephonically for this 82-year-old female patient  Encounter provider Vivienne Emery MD    Provider located at [address] [address]      Recent Visits  Date Type Provider Dept   03/24/20 Telemedicine Vivienne Emery MD [facility] Internal Med   Showing recent visits within past 7 days and meeting all other requirements     Today's Visits  Date Type Provider Dept   03/26/20 Telephone [facility]  Internal Med   Showing today's visits and meeting all other requirements     Future Appointments  Date Type Provider Dept   03/26/20 Telephone [facility]  Internal Med   Showing future appointments within next 150 days and meeting all other requirements        After connecting through [employer], the patient was identified by name and date of birth   Ruth Ann Davis was informed that this is a telemedicine visit and that the visit is being conducted through telephone which may not be secure and therefore, might not be HIPAA-compliant  My office door was closed  No one else was in the room  She acknowledged consent and understanding of privacy and security of the video platform  The patient has agreed to participate and understands they can discontinue the visit at any time  Carol Duran is a 82 y o  female this 80-year-old female call the office today for advice  She has been treated with initially amoxicillin for presumed urinary tract infection based upon dipstick testing of her urine when she was here in our office little over a week ago  She was unable to provide us with enough urine to do a full culture  She has had persistent symptoms of since transition to Keflex several days ago  She feels nauseated but has no fever she has a headache and occasional skin itching  At this point she does not have any urinary tract symptoms of burning or frequency of urination  She is seeking advice about what to do with this medication as well as her current symptoms         Past Medical History:   Diagnosis Date    Epistaxis     RESOLVED: 2/18/17    Saphenous vein thrombophlebitis, right 2012       Past Surgical History:   Procedure Laterality Date    GALLBLADDER SURGERY  2006       Current Outpatient Medications   Medication Sig Dispense Refill    ALPRAZolam (XANAX) 0 25 mg tablet TAKE 1 TABLET BY MOUTH TWICE A DAY 60 tablet 2    ALPRAZolam (XANAX) 0 25 mg tablet Take 1 tablet (0 25 mg total) by mouth 2 (two) times a day 60 tablet 0    amLODIPine (NORVASC) 2 5 mg tablet Take 1 tablet (2 5 mg total) by mouth daily 30 tablet 4    amLODIPine (NORVASC) 5 mg tablet TAKE 1 TABLET BY MOUTH EVERY DAY 30 tablet 5    aspirin 81 MG tablet Take 1 tablet by mouth daily      benzonatate (TESSALON PERLES) 100 mg capsule Take 1 capsule (100 mg total) by mouth 3 (three) times a day as needed for cough 30 capsule 1    bimatoprost (LUMIGAN) 0 01 % ophthalmic drops Apply to eye      cephalexin (KEFLEX) 500 mg capsule Take 1 capsule (500 mg total) by mouth every 12 (twelve) hours for 7 days 14 capsule 0    cholecalciferol (VITAMIN D3) 1,000 units tablet Take by mouth      ciclopirox (PENLAC) 8 % solution Apply topically daily at bedtime 6 6 mL 2    cycloSPORINE (RESTASIS) 0 05 % ophthalmic emulsion Apply to eye      diclofenac sodium (VOLTAREN) 1 % 2 g 4 (four) times a day   2    ergocalciferol (VITAMIN D2) 50,000 units Take 2,000 Units by mouth daily   5    famotidine (PEPCID) 20 mg tablet Take 1 tablet by mouth daily      fluconazole (DIFLUCAN) 150 mg tablet TAKE 1 TABLET BY MOUTH FOR 1 DOSE AFTER FINISHING METRONIDAZOLE  MAY REPEAT IN 1 WEEK IF NEEDED      fluticasone (FLONASE) 50 mcg/act nasal spray SPRAY 1 SPRAY INTO EACH NOSTRIL TWICE A DAY 48 mL 3    lisinopril (ZESTRIL) 40 mg tablet TAKE 1 TABLET BY MOUTH EVERY DAY 90 tablet 3    loratadine (CLARITIN) 10 mg tablet Take 1 tablet by mouth daily      metroNIDAZOLE (FLAGYL) 500 mg tablet TAKE 1 TABLET BY MOUTH TWICE A DAY FOR 7 DAYS      montelukast (SINGULAIR) 10 mg tablet Take 1 tablet (10 mg total) by mouth daily at bedtime 30 tablet 3    ondansetron (ZOFRAN) 4 mg tablet Take 1 tablet (4 mg total) by mouth every 8 (eight) hours as needed for nausea or vomiting 10 tablet 1    PREMARIN vaginal cream APPLY 1/4 INCH WITH FINGER TO VAGINA MONDAY AND FRIDAY AT BEDTIME  2    prochlorperazine (COMPAZINE) 10 mg tablet Take by mouth      timolol (TIMOPTIC) 0 5 % ophthalmic solution INSTILL 1 DROP INTO RIGHT EYE EVERY DAY  2    timolol (TIMOPTIC-XE) 0 5 % ophthalmic gel-forming Apply to eye      ZIOPTAN 0 0015 % ophthalmic solution ADMINISTER 1 DROP OPHTHALMICALLY AT BEDTIME IN THE RIGHT EYE, BRAND NECESSARY  6     No current facility-administered medications for this visit           Allergies   Allergen Reactions    Acetazolamide  Ciprofloxacin Headache and Nausea Only    Nitrofurantoin     Pollen Extract     Sulfa Antibiotics        Review of Systems nausea headache pruritus of the skin      I spent 15 minutes with the patient during this visit

## 2020-03-26 NOTE — ASSESSMENT & PLAN NOTE
At this time there are no ongoing urinary tract infections symptoms so I have recommended discontinuing the patient's Keflex  back pain/CHEST PAIN

## 2020-03-31 ENCOUNTER — TELEMEDICINE (OUTPATIENT)
Dept: INTERNAL MEDICINE CLINIC | Facility: CLINIC | Age: 85
End: 2020-03-31
Payer: MEDICARE

## 2020-03-31 DIAGNOSIS — R39.9 UTI SYMPTOMS: Primary | ICD-10-CM

## 2020-03-31 DIAGNOSIS — R68.89 SOMATIC COMPLAINTS, MULTIPLE: ICD-10-CM

## 2020-03-31 PROCEDURE — 99442 PR PHYS/QHP TELEPHONE EVALUATION 11-20 MIN: CPT | Performed by: INTERNAL MEDICINE

## 2020-03-31 NOTE — ASSESSMENT & PLAN NOTE
Urinary tract symptoms have resolved at this time she denies any frequency of urination or burning on urination and has no evidence of any hematuria  Will observe at this point she will notify me if she has any new symptoms

## 2020-03-31 NOTE — ASSESSMENT & PLAN NOTE
Somatic complaints present at the time of her last virtual visit have resolved since the discontinuation of Keflex  These include a headache and pruritus of the skin  I suspect most likely she did have a sensitivity to the Keflex medication

## 2020-03-31 NOTE — PROGRESS NOTES
Virtual Brief Visit    Problem List Items Addressed This Visit        Other    UTI symptoms - Primary     Urinary tract symptoms have resolved at this time she denies any frequency of urination or burning on urination and has no evidence of any hematuria  Will observe at this point she will notify me if she has any new symptoms  Somatic complaints, multiple     Somatic complaints present at the time of her last virtual visit have resolved since the discontinuation of Keflex  These include a headache and pruritus of the skin  I suspect most likely she did have a sensitivity to the Keflex medication  Reason for visit is follow-up assessment after discontinuation of antibiotic therapy of Keflex    Encounter provider Shreya Powell MD    Provider located at [place] [id number] [facility] [address]      Recent Visits  Date Type Provider Dept   03/26/20 Telemedicine Shreya Powell MD [facility]  Internal Med   03/26/20 [facility]phone [facility] Med   03/24/20 Telemedicine Shreya Powell MD [facility] Internal Med   Showing recent visits within past 7 days and meeting all other requirements     Today's Visits  Date Type Provider Dept   03/31/20 Telemedicine Shreya Powell MD [facility]  Internal Med   Showing today's visits and meeting all other requirements     Future Appointments  Date Type Provider Dept   03/31/20 Telemedicine Shreya Powell MD [facility]  Internal Med   Showing future appointments within next 150 days and meeting all other requirements        After connecting through telephone, the patient was identified by name and date of birth  Lisa Leanne was informed that this is a telemedicine visit and that the visit is being conducted through telephone  My office door was closed  No one else was in the room    She acknowledged consent and understanding of privacy and security of the video platform  The patient has agreed to participate and understands they can discontinue the visit at any time  Patient is aware this is a billable service  Subjective  Lucía Sood is a 82 y o  female during today's telephonic virtual visit the patient indicates that she is starting to feel better since the discontinuation of her Keflex  She does continue to have some bladder issues which are attributed to prolapse of her bladder  She continues to use a pessary for control of her symptoms  She occasionally gets a chill during the day but denies any ongoing of fevers  Of overall she sounds more upbeat than she did on her last visit with us  I suspect that she will do fine from here on out she does not have any ongoing symptoms that indicate a need to subject her to a COVID-19 risk of exposure to go to the outpatient lab for a follow-up urine culture  At this point will monitor if she has symptoms re-evaluate         Past Medical History:   Diagnosis Date    Epistaxis     RESOLVED: 2/18/17    Saphenous vein thrombophlebitis, right 2012       Past Surgical History:   Procedure Laterality Date    GALLBLADDER SURGERY  2006       Current Outpatient Medications   Medication Sig Dispense Refill    ALPRAZolam (XANAX) 0 25 mg tablet TAKE 1 TABLET BY MOUTH TWICE A DAY 60 tablet 2    ALPRAZolam (XANAX) 0 25 mg tablet Take 1 tablet (0 25 mg total) by mouth 2 (two) times a day 60 tablet 0    amLODIPine (NORVASC) 2 5 mg tablet Take 1 tablet (2 5 mg total) by mouth daily 30 tablet 4    amLODIPine (NORVASC) 5 mg tablet TAKE 1 TABLET BY MOUTH EVERY DAY 30 tablet 5    aspirin 81 MG tablet Take 1 tablet by mouth daily      benzonatate (TESSALON PERLES) 100 mg capsule Take 1 capsule (100 mg total) by mouth 3 (three) times a day as needed for cough 30 capsule 1    bimatoprost (LUMIGAN) 0 01 % ophthalmic drops Apply to eye      cholecalciferol (VITAMIN D3) 1,000 units tablet Take by mouth      ciclopirox (PENLAC) 8 % solution Apply topically daily at bedtime 6 6 mL 2    cycloSPORINE (RESTASIS) 0 05 % ophthalmic emulsion Apply to eye      diclofenac sodium (VOLTAREN) 1 % 2 g 4 (four) times a day   2    ergocalciferol (VITAMIN D2) 50,000 units Take 2,000 Units by mouth daily   5    famotidine (PEPCID) 20 mg tablet Take 1 tablet by mouth daily      fluconazole (DIFLUCAN) 150 mg tablet TAKE 1 TABLET BY MOUTH FOR 1 DOSE AFTER FINISHING METRONIDAZOLE  MAY REPEAT IN 1 WEEK IF NEEDED      fluticasone (FLONASE) 50 mcg/act nasal spray SPRAY 1 SPRAY INTO EACH NOSTRIL TWICE A DAY 48 mL 3    lisinopril (ZESTRIL) 40 mg tablet TAKE 1 TABLET BY MOUTH EVERY DAY 90 tablet 3    loratadine (CLARITIN) 10 mg tablet Take 1 tablet by mouth daily      metroNIDAZOLE (FLAGYL) 500 mg tablet TAKE 1 TABLET BY MOUTH TWICE A DAY FOR 7 DAYS      montelukast (SINGULAIR) 10 mg tablet Take 1 tablet (10 mg total) by mouth daily at bedtime 30 tablet 3    ondansetron (ZOFRAN) 4 mg tablet Take 1 tablet (4 mg total) by mouth every 8 (eight) hours as needed for nausea or vomiting 10 tablet 1    PREMARIN vaginal cream APPLY 1/4 INCH WITH FINGER TO VAGINA MONDAY AND FRIDAY AT BEDTIME  2    prochlorperazine (COMPAZINE) 10 mg tablet Take by mouth      timolol (TIMOPTIC) 0 5 % ophthalmic solution INSTILL 1 DROP INTO RIGHT EYE EVERY DAY  2    timolol (TIMOPTIC-XE) 0 5 % ophthalmic gel-forming Apply to eye      ZIOPTAN 0 0015 % ophthalmic solution ADMINISTER 1 DROP OPHTHALMICALLY AT BEDTIME IN THE RIGHT EYE, BRAND NECESSARY  6     No current facility-administered medications for this visit  Allergies   Allergen Reactions    Acetazolamide     Ciprofloxacin Headache and Nausea Only    Nitrofurantoin     Pollen Extract     Sulfa Antibiotics     Keflex [Cephalexin] Itching       Review of Systems chills      I spent 15 minutes with the patient during this visit

## 2020-04-23 DIAGNOSIS — F41.9 ANXIETY: ICD-10-CM

## 2020-04-23 RX ORDER — ALPRAZOLAM 0.25 MG/1
0.25 TABLET ORAL 2 TIMES DAILY
Qty: 60 TABLET | Refills: 0 | Status: SHIPPED | OUTPATIENT
Start: 2020-04-23 | End: 2020-05-08 | Stop reason: SDUPTHER

## 2020-05-08 ENCOUNTER — OFFICE VISIT (OUTPATIENT)
Dept: INTERNAL MEDICINE CLINIC | Facility: CLINIC | Age: 85
End: 2020-05-08
Payer: MEDICARE

## 2020-05-08 DIAGNOSIS — I10 ESSENTIAL HYPERTENSION: ICD-10-CM

## 2020-05-08 DIAGNOSIS — Z13.1 SCREENING FOR DIABETES MELLITUS: ICD-10-CM

## 2020-05-08 DIAGNOSIS — Z13.29 SCREENING FOR HYPOTHYROIDISM: ICD-10-CM

## 2020-05-08 DIAGNOSIS — M79.7 FIBROMYALGIA: ICD-10-CM

## 2020-05-08 DIAGNOSIS — Z13.0 SCREENING FOR DEFICIENCY ANEMIA: ICD-10-CM

## 2020-05-08 DIAGNOSIS — R53.83 OTHER FATIGUE: Primary | ICD-10-CM

## 2020-05-08 PROBLEM — R39.9 UTI SYMPTOMS: Status: RESOLVED | Noted: 2020-03-18 | Resolved: 2020-05-08

## 2020-05-08 PROBLEM — R68.89 SOMATIC COMPLAINTS, MULTIPLE: Status: RESOLVED | Noted: 2020-03-26 | Resolved: 2020-05-08

## 2020-05-08 PROBLEM — G47.9 SLEEP DISTURBANCE: Status: RESOLVED | Noted: 2018-06-29 | Resolved: 2020-05-08

## 2020-05-08 PROCEDURE — 99214 OFFICE O/P EST MOD 30 MIN: CPT | Performed by: INTERNAL MEDICINE

## 2020-05-08 PROCEDURE — 3078F DIAST BP <80 MM HG: CPT | Performed by: INTERNAL MEDICINE

## 2020-05-08 PROCEDURE — 1160F RVW MEDS BY RX/DR IN RCRD: CPT | Performed by: INTERNAL MEDICINE

## 2020-05-08 PROCEDURE — 3074F SYST BP LT 130 MM HG: CPT | Performed by: INTERNAL MEDICINE

## 2020-05-08 PROCEDURE — 4040F PNEUMOC VAC/ADMIN/RCVD: CPT | Performed by: INTERNAL MEDICINE

## 2020-05-08 PROCEDURE — 1036F TOBACCO NON-USER: CPT | Performed by: INTERNAL MEDICINE

## 2020-05-08 RX ORDER — NEOMYCIN SULFATE, POLYMYXIN B SULFATE, AND DEXAMETHASONE 3.5; 10000; 1 MG/G; [USP'U]/G; MG/G
OINTMENT OPHTHALMIC
COMMUNITY
Start: 2020-03-11 | End: 2020-08-20 | Stop reason: ALTCHOICE

## 2020-05-08 RX ORDER — OMEPRAZOLE 10 MG/1
20 CAPSULE, DELAYED RELEASE ORAL DAILY
COMMUNITY
End: 2020-08-20 | Stop reason: ALTCHOICE

## 2020-05-11 ENCOUNTER — APPOINTMENT (OUTPATIENT)
Dept: LAB | Age: 85
End: 2020-05-11
Payer: MEDICARE

## 2020-05-11 ENCOUNTER — TRANSCRIBE ORDERS (OUTPATIENT)
Dept: ADMINISTRATIVE | Age: 85
End: 2020-05-11

## 2020-05-11 DIAGNOSIS — Z13.0 SCREENING FOR DEFICIENCY ANEMIA: ICD-10-CM

## 2020-05-11 DIAGNOSIS — Z13.29 SCREENING FOR HYPOTHYROIDISM: ICD-10-CM

## 2020-05-11 DIAGNOSIS — Z13.1 SCREENING FOR DIABETES MELLITUS: ICD-10-CM

## 2020-05-11 DIAGNOSIS — E78.5 HYPERLIPIDEMIA, UNSPECIFIED HYPERLIPIDEMIA TYPE: ICD-10-CM

## 2020-05-11 LAB
ALBUMIN SERPL BCP-MCNC: 3.6 G/DL (ref 3.5–5)
ALP SERPL-CCNC: 88 U/L (ref 46–116)
ALT SERPL W P-5'-P-CCNC: 21 U/L (ref 12–78)
ANION GAP SERPL CALCULATED.3IONS-SCNC: 4 MMOL/L (ref 4–13)
AST SERPL W P-5'-P-CCNC: 14 U/L (ref 5–45)
BASOPHILS # BLD AUTO: 0.03 THOUSANDS/ΜL (ref 0–0.1)
BASOPHILS NFR BLD AUTO: 1 % (ref 0–1)
BILIRUB SERPL-MCNC: 0.36 MG/DL (ref 0.2–1)
BUN SERPL-MCNC: 33 MG/DL (ref 5–25)
CALCIUM SERPL-MCNC: 9.5 MG/DL (ref 8.3–10.1)
CHLORIDE SERPL-SCNC: 107 MMOL/L (ref 100–108)
CO2 SERPL-SCNC: 28 MMOL/L (ref 21–32)
CREAT SERPL-MCNC: 0.75 MG/DL (ref 0.6–1.3)
EOSINOPHIL # BLD AUTO: 0.28 THOUSAND/ΜL (ref 0–0.61)
EOSINOPHIL NFR BLD AUTO: 5 % (ref 0–6)
ERYTHROCYTE [DISTWIDTH] IN BLOOD BY AUTOMATED COUNT: 13.3 % (ref 11.6–15.1)
GFR SERPL CREATININE-BSD FRML MDRD: 74 ML/MIN/1.73SQ M
GLUCOSE P FAST SERPL-MCNC: 104 MG/DL (ref 65–99)
HCT VFR BLD AUTO: 44.5 % (ref 34.8–46.1)
HGB BLD-MCNC: 13.7 G/DL (ref 11.5–15.4)
IMM GRANULOCYTES # BLD AUTO: 0.01 THOUSAND/UL (ref 0–0.2)
IMM GRANULOCYTES NFR BLD AUTO: 0 % (ref 0–2)
LYMPHOCYTES # BLD AUTO: 1.98 THOUSANDS/ΜL (ref 0.6–4.47)
LYMPHOCYTES NFR BLD AUTO: 33 % (ref 14–44)
MCH RBC QN AUTO: 29.1 PG (ref 26.8–34.3)
MCHC RBC AUTO-ENTMCNC: 30.8 G/DL (ref 31.4–37.4)
MCV RBC AUTO: 95 FL (ref 82–98)
MONOCYTES # BLD AUTO: 0.64 THOUSAND/ΜL (ref 0.17–1.22)
MONOCYTES NFR BLD AUTO: 11 % (ref 4–12)
NEUTROPHILS # BLD AUTO: 3.14 THOUSANDS/ΜL (ref 1.85–7.62)
NEUTS SEG NFR BLD AUTO: 50 % (ref 43–75)
NRBC BLD AUTO-RTO: 0 /100 WBCS
PLATELET # BLD AUTO: 260 THOUSANDS/UL (ref 149–390)
PMV BLD AUTO: 10.8 FL (ref 8.9–12.7)
POTASSIUM SERPL-SCNC: 4.1 MMOL/L (ref 3.5–5.3)
PROT SERPL-MCNC: 7.3 G/DL (ref 6.4–8.2)
RBC # BLD AUTO: 4.71 MILLION/UL (ref 3.81–5.12)
SODIUM SERPL-SCNC: 139 MMOL/L (ref 136–145)
T4 FREE SERPL-MCNC: 1.01 NG/DL (ref 0.76–1.46)
TSH SERPL DL<=0.05 MIU/L-ACNC: 1.97 UIU/ML (ref 0.36–3.74)
WBC # BLD AUTO: 6.08 THOUSAND/UL (ref 4.31–10.16)

## 2020-05-11 PROCEDURE — 84443 ASSAY THYROID STIM HORMONE: CPT

## 2020-05-11 PROCEDURE — 85025 COMPLETE CBC W/AUTO DIFF WBC: CPT

## 2020-05-11 PROCEDURE — 80053 COMPREHEN METABOLIC PANEL: CPT

## 2020-05-11 PROCEDURE — 36415 COLL VENOUS BLD VENIPUNCTURE: CPT

## 2020-05-11 PROCEDURE — 84439 ASSAY OF FREE THYROXINE: CPT

## 2020-05-13 ENCOUNTER — TELEMEDICINE (OUTPATIENT)
Dept: INTERNAL MEDICINE CLINIC | Facility: CLINIC | Age: 85
End: 2020-05-13
Payer: MEDICARE

## 2020-05-13 VITALS — BODY MASS INDEX: 28.51 KG/M2 | HEIGHT: 64 IN | WEIGHT: 167 LBS

## 2020-05-13 DIAGNOSIS — I10 ESSENTIAL HYPERTENSION: ICD-10-CM

## 2020-05-13 DIAGNOSIS — F41.9 ANXIETY: ICD-10-CM

## 2020-05-13 DIAGNOSIS — R53.83 OTHER FATIGUE: Primary | ICD-10-CM

## 2020-05-13 DIAGNOSIS — M79.7 FIBROMYALGIA: ICD-10-CM

## 2020-05-13 DIAGNOSIS — E74.39 GLUCOSE INTOLERANCE: ICD-10-CM

## 2020-05-13 PROCEDURE — 4040F PNEUMOC VAC/ADMIN/RCVD: CPT | Performed by: INTERNAL MEDICINE

## 2020-05-13 PROCEDURE — 3078F DIAST BP <80 MM HG: CPT | Performed by: INTERNAL MEDICINE

## 2020-05-13 PROCEDURE — 3008F BODY MASS INDEX DOCD: CPT | Performed by: INTERNAL MEDICINE

## 2020-05-13 PROCEDURE — 3074F SYST BP LT 130 MM HG: CPT | Performed by: INTERNAL MEDICINE

## 2020-05-13 PROCEDURE — 1160F RVW MEDS BY RX/DR IN RCRD: CPT | Performed by: INTERNAL MEDICINE

## 2020-05-13 PROCEDURE — 1036F TOBACCO NON-USER: CPT | Performed by: INTERNAL MEDICINE

## 2020-05-13 PROCEDURE — 99214 OFFICE O/P EST MOD 30 MIN: CPT | Performed by: INTERNAL MEDICINE

## 2020-05-21 DIAGNOSIS — F41.9 ANXIETY: ICD-10-CM

## 2020-05-21 RX ORDER — ALPRAZOLAM 0.25 MG/1
0.25 TABLET ORAL 2 TIMES DAILY
Qty: 60 TABLET | Refills: 0 | Status: SHIPPED | OUTPATIENT
Start: 2020-05-21 | End: 2020-06-23 | Stop reason: SDUPTHER

## 2020-06-16 ENCOUNTER — HOSPITAL ENCOUNTER (OUTPATIENT)
Dept: RADIOLOGY | Age: 85
Discharge: HOME | End: 2020-06-16
Payer: MEDICARE

## 2020-06-16 DIAGNOSIS — N95.0 POST-MENOPAUSAL BLEEDING: ICD-10-CM

## 2020-06-16 PROCEDURE — 76856 US EXAM PELVIC COMPLETE: CPT

## 2020-06-23 DIAGNOSIS — F41.9 ANXIETY: ICD-10-CM

## 2020-06-23 RX ORDER — ALPRAZOLAM 0.25 MG/1
0.25 TABLET ORAL 2 TIMES DAILY
Qty: 60 TABLET | Refills: 0 | Status: SHIPPED | OUTPATIENT
Start: 2020-06-25 | End: 2020-07-24 | Stop reason: SDUPTHER

## 2020-07-15 ENCOUNTER — TRANSCRIBE ORDERS (OUTPATIENT)
Dept: ADMINISTRATIVE | Age: 85
End: 2020-07-15

## 2020-07-15 ENCOUNTER — APPOINTMENT (OUTPATIENT)
Dept: LAB | Age: 85
End: 2020-07-15
Payer: MEDICARE

## 2020-07-15 DIAGNOSIS — E55.9 AVITAMINOSIS D: ICD-10-CM

## 2020-07-15 DIAGNOSIS — M79.7 SCAPULOHUMERAL FIBROSITIS: ICD-10-CM

## 2020-07-15 DIAGNOSIS — Z13.0 SCREENING FOR DEFICIENCY ANEMIA: ICD-10-CM

## 2020-07-15 DIAGNOSIS — M35.01 KERATOCONJUNCTIVITIS SICCA (HCC): ICD-10-CM

## 2020-07-15 DIAGNOSIS — M35.01 KERATOCONJUNCTIVITIS SICCA (HCC): Primary | ICD-10-CM

## 2020-07-15 LAB
25(OH)D3 SERPL-MCNC: 17.6 NG/ML (ref 30–100)
ALBUMIN SERPL BCP-MCNC: 3.8 G/DL (ref 3.5–5)
ALP SERPL-CCNC: 81 U/L (ref 46–116)
ALT SERPL W P-5'-P-CCNC: 19 U/L (ref 12–78)
ANION GAP SERPL CALCULATED.3IONS-SCNC: 6 MMOL/L (ref 4–13)
AST SERPL W P-5'-P-CCNC: 12 U/L (ref 5–45)
BACTERIA UR QL AUTO: ABNORMAL /HPF
BACTERIA UR QL AUTO: ABNORMAL /HPF
BASOPHILS # BLD AUTO: 0.03 THOUSANDS/ΜL (ref 0–0.1)
BASOPHILS NFR BLD AUTO: 0 % (ref 0–1)
BILIRUB SERPL-MCNC: 0.51 MG/DL (ref 0.2–1)
BILIRUB UR QL STRIP: NEGATIVE
BILIRUB UR QL STRIP: NEGATIVE
BUN SERPL-MCNC: 36 MG/DL (ref 5–25)
CALCIUM SERPL-MCNC: 9.5 MG/DL (ref 8.3–10.1)
CHLORIDE SERPL-SCNC: 105 MMOL/L (ref 100–108)
CHOLEST SERPL-MCNC: 312 MG/DL (ref 50–200)
CLARITY UR: ABNORMAL
CLARITY UR: ABNORMAL
CO2 SERPL-SCNC: 25 MMOL/L (ref 21–32)
COLOR UR: YELLOW
COLOR UR: YELLOW
CREAT SERPL-MCNC: 0.78 MG/DL (ref 0.6–1.3)
CRP SERPL QL: <3 MG/L
EOSINOPHIL # BLD AUTO: 0.15 THOUSAND/ΜL (ref 0–0.61)
EOSINOPHIL NFR BLD AUTO: 2 % (ref 0–6)
ERYTHROCYTE [DISTWIDTH] IN BLOOD BY AUTOMATED COUNT: 13.2 % (ref 11.6–15.1)
ERYTHROCYTE [SEDIMENTATION RATE] IN BLOOD: 32 MM/HOUR (ref 0–20)
GFR SERPL CREATININE-BSD FRML MDRD: 71 ML/MIN/1.73SQ M
GLUCOSE SERPL-MCNC: 92 MG/DL (ref 65–140)
GLUCOSE UR STRIP-MCNC: NEGATIVE MG/DL
GLUCOSE UR STRIP-MCNC: NEGATIVE MG/DL
HCT VFR BLD AUTO: 45.1 % (ref 34.8–46.1)
HDLC SERPL-MCNC: 65 MG/DL
HGB BLD-MCNC: 14.2 G/DL (ref 11.5–15.4)
HGB UR QL STRIP.AUTO: NEGATIVE
HGB UR QL STRIP.AUTO: NEGATIVE
HYALINE CASTS #/AREA URNS LPF: ABNORMAL /LPF
HYALINE CASTS #/AREA URNS LPF: ABNORMAL /LPF
IMM GRANULOCYTES # BLD AUTO: 0.02 THOUSAND/UL (ref 0–0.2)
IMM GRANULOCYTES NFR BLD AUTO: 0 % (ref 0–2)
KETONES UR STRIP-MCNC: NEGATIVE MG/DL
KETONES UR STRIP-MCNC: NEGATIVE MG/DL
LDLC SERPL CALC-MCNC: 217 MG/DL (ref 0–100)
LEUKOCYTE ESTERASE UR QL STRIP: ABNORMAL
LEUKOCYTE ESTERASE UR QL STRIP: ABNORMAL
LYMPHOCYTES # BLD AUTO: 1.81 THOUSANDS/ΜL (ref 0.6–4.47)
LYMPHOCYTES NFR BLD AUTO: 20 % (ref 14–44)
MCH RBC QN AUTO: 29.5 PG (ref 26.8–34.3)
MCHC RBC AUTO-ENTMCNC: 31.5 G/DL (ref 31.4–37.4)
MCV RBC AUTO: 94 FL (ref 82–98)
MONOCYTES # BLD AUTO: 0.8 THOUSAND/ΜL (ref 0.17–1.22)
MONOCYTES NFR BLD AUTO: 9 % (ref 4–12)
NEUTROPHILS # BLD AUTO: 6.15 THOUSANDS/ΜL (ref 1.85–7.62)
NEUTS SEG NFR BLD AUTO: 69 % (ref 43–75)
NITRITE UR QL STRIP: NEGATIVE
NITRITE UR QL STRIP: NEGATIVE
NON-SQ EPI CELLS URNS QL MICRO: ABNORMAL /HPF
NON-SQ EPI CELLS URNS QL MICRO: ABNORMAL /HPF
NONHDLC SERPL-MCNC: 247 MG/DL
NRBC BLD AUTO-RTO: 0 /100 WBCS
PH UR STRIP.AUTO: 6.5 [PH]
PH UR STRIP.AUTO: 6.5 [PH]
PLATELET # BLD AUTO: 261 THOUSANDS/UL (ref 149–390)
PMV BLD AUTO: 10.6 FL (ref 8.9–12.7)
POTASSIUM SERPL-SCNC: 4.4 MMOL/L (ref 3.5–5.3)
PROT SERPL-MCNC: 7.2 G/DL (ref 6.4–8.2)
PROT UR STRIP-MCNC: NEGATIVE MG/DL
PROT UR STRIP-MCNC: NEGATIVE MG/DL
RBC # BLD AUTO: 4.81 MILLION/UL (ref 3.81–5.12)
RBC #/AREA URNS AUTO: ABNORMAL /HPF
RBC #/AREA URNS AUTO: ABNORMAL /HPF
SODIUM SERPL-SCNC: 136 MMOL/L (ref 136–145)
SP GR UR STRIP.AUTO: 1.01 (ref 1–1.03)
SP GR UR STRIP.AUTO: 1.01 (ref 1–1.03)
TRIGL SERPL-MCNC: 149 MG/DL
URATE SERPL-MCNC: 4.8 MG/DL (ref 2–6.8)
UROBILINOGEN UR QL STRIP.AUTO: 0.2 E.U./DL
UROBILINOGEN UR QL STRIP.AUTO: 0.2 E.U./DL
WBC # BLD AUTO: 8.96 THOUSAND/UL (ref 4.31–10.16)
WBC #/AREA URNS AUTO: ABNORMAL /HPF
WBC #/AREA URNS AUTO: ABNORMAL /HPF

## 2020-07-15 PROCEDURE — 81001 URINALYSIS AUTO W/SCOPE: CPT | Performed by: INTERNAL MEDICINE

## 2020-07-15 PROCEDURE — 86038 ANTINUCLEAR ANTIBODIES: CPT

## 2020-07-15 PROCEDURE — 82306 VITAMIN D 25 HYDROXY: CPT

## 2020-07-15 PROCEDURE — 86140 C-REACTIVE PROTEIN: CPT

## 2020-07-15 PROCEDURE — 84550 ASSAY OF BLOOD/URIC ACID: CPT

## 2020-07-15 PROCEDURE — 86618 LYME DISEASE ANTIBODY: CPT

## 2020-07-15 PROCEDURE — 80053 COMPREHEN METABOLIC PANEL: CPT

## 2020-07-15 PROCEDURE — 80061 LIPID PANEL: CPT

## 2020-07-15 PROCEDURE — 36415 COLL VENOUS BLD VENIPUNCTURE: CPT

## 2020-07-15 PROCEDURE — 86430 RHEUMATOID FACTOR TEST QUAL: CPT

## 2020-07-15 PROCEDURE — 85652 RBC SED RATE AUTOMATED: CPT | Performed by: INTERNAL MEDICINE

## 2020-07-15 PROCEDURE — 85025 COMPLETE CBC W/AUTO DIFF WBC: CPT

## 2020-07-15 PROCEDURE — 86200 CCP ANTIBODY: CPT

## 2020-07-16 LAB — RHEUMATOID FACT SER QL LA: NEGATIVE

## 2020-07-17 LAB
B BURGDOR IGG+IGM SER-ACNC: <0.91 ISR (ref 0–0.9)
CCP IGA+IGG SERPL IA-ACNC: 13 UNITS (ref 0–19)
RYE IGE QN: NEGATIVE

## 2020-07-21 ENCOUNTER — TELEPHONE (OUTPATIENT)
Dept: INTERNAL MEDICINE CLINIC | Facility: CLINIC | Age: 85
End: 2020-07-21

## 2020-07-21 ENCOUNTER — OFFICE VISIT (OUTPATIENT)
Dept: INTERNAL MEDICINE CLINIC | Facility: CLINIC | Age: 85
End: 2020-07-21
Payer: MEDICARE

## 2020-07-21 VITALS
HEIGHT: 64 IN | WEIGHT: 168 LBS | SYSTOLIC BLOOD PRESSURE: 128 MMHG | BODY MASS INDEX: 28.68 KG/M2 | HEART RATE: 105 BPM | TEMPERATURE: 98 F | DIASTOLIC BLOOD PRESSURE: 72 MMHG | OXYGEN SATURATION: 93 %

## 2020-07-21 DIAGNOSIS — Z00.00 HEALTHCARE MAINTENANCE: ICD-10-CM

## 2020-07-21 DIAGNOSIS — I10 ESSENTIAL HYPERTENSION: Primary | ICD-10-CM

## 2020-07-21 DIAGNOSIS — I83.93 VARICOSE VEINS OF BOTH LOWER EXTREMITIES, UNSPECIFIED WHETHER COMPLICATED: ICD-10-CM

## 2020-07-21 DIAGNOSIS — R14.3 FLATULENCE: ICD-10-CM

## 2020-07-21 DIAGNOSIS — F41.9 ANXIETY: ICD-10-CM

## 2020-07-21 DIAGNOSIS — M79.7 FIBROMYALGIA: ICD-10-CM

## 2020-07-21 DIAGNOSIS — F32.9 REACTIVE DEPRESSION: ICD-10-CM

## 2020-07-21 PROBLEM — R05.9 COUGH: Status: RESOLVED | Noted: 2019-07-16 | Resolved: 2020-07-21

## 2020-07-21 PROBLEM — R07.89 CHEST WALL PAIN: Status: RESOLVED | Noted: 2019-04-03 | Resolved: 2020-07-21
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
## 2020-07-21 PROCEDURE — 1170F FXNL STATUS ASSESSED: CPT | Performed by: INTERNAL MEDICINE

## 2020-07-21 PROCEDURE — G0439 PPPS, SUBSEQ VISIT: HCPCS | Performed by: INTERNAL MEDICINE

## 2020-07-21 PROCEDURE — 1036F TOBACCO NON-USER: CPT | Performed by: INTERNAL MEDICINE

## 2020-07-21 PROCEDURE — 4040F PNEUMOC VAC/ADMIN/RCVD: CPT | Performed by: INTERNAL MEDICINE

## 2020-07-21 PROCEDURE — 1125F AMNT PAIN NOTED PAIN PRSNT: CPT | Performed by: INTERNAL MEDICINE

## 2020-07-21 PROCEDURE — 3078F DIAST BP <80 MM HG: CPT | Performed by: INTERNAL MEDICINE

## 2020-07-21 PROCEDURE — 3008F BODY MASS INDEX DOCD: CPT | Performed by: INTERNAL MEDICINE

## 2020-07-21 PROCEDURE — 1160F RVW MEDS BY RX/DR IN RCRD: CPT | Performed by: INTERNAL MEDICINE

## 2020-07-21 PROCEDURE — 1123F ACP DISCUSS/DSCN MKR DOCD: CPT | Performed by: INTERNAL MEDICINE

## 2020-07-21 PROCEDURE — 3074F SYST BP LT 130 MM HG: CPT | Performed by: INTERNAL MEDICINE

## 2020-07-21 PROCEDURE — 99215 OFFICE O/P EST HI 40 MIN: CPT | Performed by: INTERNAL MEDICINE

## 2020-07-21 RX ORDER — FLUCONAZOLE 150 MG/1
TABLET ORAL
COMMUNITY
Start: 2020-06-10 | End: 2020-07-21 | Stop reason: SDUPTHER

## 2020-07-21 RX ORDER — TIMOLOL MALEATE 6.8 MG/ML
SOLUTION/ DROPS OPHTHALMIC
COMMUNITY
Start: 2020-04-27 | End: 2020-07-21 | Stop reason: SDUPTHER

## 2020-07-21 RX ORDER — LISINOPRIL 40 MG/1
TABLET ORAL
COMMUNITY
Start: 2020-06-03 | End: 2020-07-21 | Stop reason: SDUPTHER

## 2020-07-21 RX ORDER — ALPRAZOLAM 0.25 MG/1
TABLET ORAL
COMMUNITY
Start: 2020-05-27 | End: 2020-07-21 | Stop reason: SDUPTHER

## 2020-07-21 RX ORDER — TAFLUPROST 0 MG/.3ML
SOLUTION/ DROPS OPHTHALMIC
COMMUNITY
Start: 2020-05-22 | End: 2020-07-21 | Stop reason: SDUPTHER

## 2020-07-21 RX ORDER — FLUTICASONE PROPIONATE 50 MCG
SPRAY, SUSPENSION (ML) NASAL
COMMUNITY
Start: 2020-05-10 | End: 2020-07-21 | Stop reason: SDUPTHER

## 2020-07-21 RX ORDER — METRONIDAZOLE 500 MG/1
TABLET ORAL
COMMUNITY
Start: 2020-05-14 | End: 2020-08-20 | Stop reason: ALTCHOICE

## 2020-07-21 RX ORDER — METRONIDAZOLE 500 MG/1
TABLET ORAL
COMMUNITY
Start: 2020-05-14 | End: 2020-07-21 | Stop reason: SDUPTHER

## 2020-07-21 NOTE — TELEPHONE ENCOUNTER
Called patient to schedule 4 month follow up, no answer left message asking the patient to give the office a call back

## 2020-07-21 NOTE — ASSESSMENT & PLAN NOTE
The patient commented today about increased flatulence and abdominal distension  She does not seem to have any constipation or change in overall bowel habits  We discussed in detail the foods that are likely to be flatulent producing and I have requested that she decrease these foods in her diet  Also recommended that she get gas -x which comes over-the-counter and take 1 tablet at each meal and at bedtime the ingredients and Gas-X are simethicone

## 2020-07-21 NOTE — ASSESSMENT & PLAN NOTE
Chronic anxiety symptoms recommend the continued use of alprazolam 0 25 mg q 8 hours p r n  for anxiety symptoms  No indication of abuse or dependence on this medication at the present time

## 2020-07-21 NOTE — ASSESSMENT & PLAN NOTE
Assessment of the patient's hypertension today indicates good blood pressure control with a reading 128/72  I have encouraged her to continue her amlodipine 7 5 mg daily a follow-up assessment of her hypertension is requested in a period of 4 months

## 2020-07-21 NOTE — PROGRESS NOTES
Assessment and Plan:     Problem List Items Addressed This Visit     None           Preventive health issues were discussed with patient, and age appropriate screening tests were ordered as noted in patient's After Visit Summary  Personalized health advice and appropriate referrals for health education or preventive services given if needed, as noted in patient's After Visit Summary       History of Present Illness:     Patient presents for Medicare Annual Wellness visit    Patient Care Team:  Madeline Freed MD as PCP - General  MD Lori Spaulding PA-C     Problem List:     Patient Active Problem List   Diagnosis    Anxiety    Fibromyalgia    Hypertension    Hyperlipidemia    Glucose intolerance    Flatulence    Reactive depression    Bunion of great toe    Hammertoe of right foot    Chest wall pain    Allergy    Cough    Fall    Varicose veins of both lower extremities    Nail fungus    Other fatigue      Past Medical and Surgical History:     Past Medical History:   Diagnosis Date    Epistaxis     RESOLVED: 2/18/17    Saphenous vein thrombophlebitis, right 2012     Past Surgical History:   Procedure Laterality Date    GALLBLADDER SURGERY  2006      Family History:     Family History   Problem Relation Age of Onset    Hypertension Mother       Social History:        Social History     Socioeconomic History    Marital status: /Civil Union     Spouse name: Not on file    Number of children: Not on file    Years of education: Not on file    Highest education level: Not on file   Occupational History    Not on file   Social Needs    Financial resource strain: Not on file    Food insecurity:     Worry: Not on file     Inability: Not on file    Transportation needs:     Medical: Not on file     Non-medical: Not on file   Tobacco Use    Smoking status: Never Smoker    Smokeless tobacco: Never Used   Substance and Sexual Activity    Alcohol use: Not on file   Ricky Kendall Drug use: Not on file    Sexual activity: Not on file   Lifestyle    Physical activity:     Days per week: Not on file     Minutes per session: Not on file    Stress: Not on file   Relationships    Social connections:     Talks on phone: Not on file     Gets together: Not on file     Attends Zoroastrianism service: Not on file     Active member of club or organization: Not on file     Attends meetings of clubs or organizations: Not on file     Relationship status: Not on file    Intimate partner violence:     Fear of current or ex partner: Not on file     Emotionally abused: Not on file     Physically abused: Not on file     Forced sexual activity: Not on file   Other Topics Concern    Not on file   Social History Narrative    Not on file      Medications and Allergies:     Current Outpatient Medications   Medication Sig Dispense Refill    ALPRAZolam (XANAX) 0 25 mg tablet Take 1 tablet (0 25 mg total) by mouth 2 (two) times a day 60 tablet 0    amLODIPine (NORVASC) 2 5 mg tablet Take 1 tablet (2 5 mg total) by mouth daily 30 tablet 4    amLODIPine (NORVASC) 5 mg tablet TAKE 1 TABLET BY MOUTH EVERY DAY 30 tablet 5    aspirin 81 MG tablet Take 1 tablet by mouth daily 3 times a week      bimatoprost (LUMIGAN) 0 01 % ophthalmic drops Apply to eye      cholecalciferol (VITAMIN D3) 1,000 units tablet Take by mouth      ciclopirox (PENLAC) 8 % solution Apply topically daily at bedtime 6 6 mL 2    cycloSPORINE (RESTASIS) 0 05 % ophthalmic emulsion Apply to eye      diclofenac sodium (VOLTAREN) 1 % 2 g 4 (four) times a day   2    famotidine (PEPCID) 20 mg tablet Take 1 tablet by mouth daily      fluconazole (DIFLUCAN) 150 mg tablet TAKE 1 TABLET BY MOUTH FOR 1 DOSE AFTER FINISHING METRONIDAZOLE  MAY REPEAT IN 1 WEEK IF NEEDED      fluticasone (FLONASE) 50 mcg/act nasal spray SPRAY 1 SPRAY INTO EACH NOSTRIL TWICE A DAY 48 mL 3    lisinopril (ZESTRIL) 40 mg tablet TAKE 1 TABLET BY MOUTH EVERY DAY 90 tablet 3  loratadine (CLARITIN) 10 mg tablet Take 1 tablet by mouth daily      neomycin-polymyxin-dexamethasone (MAXITROL) 0 35%-10,000 units/g-0 1% APPLY TO LIDS OF BOTH EYES 2 TIMES DAILY      omeprazole (PriLOSEC) 10 mg delayed release capsule Take 20 mg by mouth daily      PREMARIN vaginal cream APPLY 1/4 INCH WITH FINGER TO VAGINA MONDAY AND FRIDAY AT BEDTIME  2    prochlorperazine (COMPAZINE) 10 mg tablet Take by mouth      timolol (TIMOPTIC) 0 5 % ophthalmic solution INSTILL 1 DROP INTO RIGHT EYE EVERY DAY  2    ZIOPTAN 0 0015 % ophthalmic solution ADMINISTER 1 DROP OPHTHALMICALLY AT BEDTIME IN THE RIGHT EYE, BRAND NECESSARY  6     No current facility-administered medications for this visit  Allergies   Allergen Reactions    Acetazolamide     Ciprofloxacin Headache and Nausea Only    Nitrofurantoin     Pollen Extract     Sulfa Antibiotics     Keflex [Cephalexin] Itching      Immunizations:     Immunization History   Administered Date(s) Administered    Influenza Split High Dose Preservative Free IM 10/18/2016, 10/16/2017    Influenza, high dose seasonal 0 5 mL 10/31/2018, 09/30/2019    Pneumococcal Conjugate 13-Valent 02/17/2017      Health Maintenance: There are no preventive care reminders to display for this patient  Topic Date Due    DTaP,Tdap,and Td Vaccines (1 - Tdap) 04/08/1946    Pneumococcal Vaccine: 65+ Years (2 of 2 - PPSV23) 02/17/2018    Influenza Vaccine  07/01/2020      Medicare Health Risk Assessment:     There were no vitals taken for this visit  Mahendra Goodrich is here for her Subsequent Wellness visit  Last Medicare Wellness visit information reviewed, patient interviewed and updates made to the record today  Health Risk Assessment:   Patient rates overall health as good  Patient feels that their physical health rating is same  Eyesight was rated as same  Hearing was rated as same  Patient feels that their emotional and mental health rating is slightly better   Pain experienced in the last 7 days has been some  Patient's pain rating has been 4/10  Patient states that she has experienced no weight loss or gain in last 6 months  Depression Screening:   PHQ-2 Score: 2  PHQ-9 Score: 2      Fall Risk Screening: In the past year, patient has experienced: no history of falling in past year      Urinary Incontinence Screening:   Patient has leaked urine accidently in the last six months  Home Safety:  Patient does not have trouble with stairs inside or outside of their home  Patient has working smoke alarms and has working carbon monoxide detector  Home safety hazards include: none  Nutrition:   Current diet is Regular  Medications:   Patient is currently taking over-the-counter supplements  OTC medications include: see medication list  Patient is able to manage medications  Activities of Daily Living (ADLs)/Instrumental Activities of Daily Living (IADLs):   Walk and transfer into and out of bed and chair?: Yes  Dress and groom yourself?: Yes    Bathe or shower yourself?: Yes    Feed yourself?  Yes  Do your laundry/housekeeping?: Yes  Manage your money, pay your bills and track your expenses?: Yes  Make your own meals?: Yes    Do your own shopping?: Yes    Previous Hospitalizations:   Any hospitalizations or ED visits within the last 12 months?: No      Advance Care Planning:   Living will: Yes    Durable POA for healthcare: No    Advanced directive: Yes      Cognitive Screening:   Provider or family/friend/caregiver concerned regarding cognition?: No    PREVENTIVE SCREENINGS      Cardiovascular Screening:    General: Screening Not Indicated and History Lipid Disorder      Diabetes Screening:     General: Screening Current      Breast Cancer Screening:     General: Screening Current      Cervical Cancer Screening:    General: Screening Not Indicated      Osteoporosis Screening:    General: Screening Not Indicated      Abdominal Aortic Aneurysm (AAA) Screening: General: Screening Not Indicated      Lung Cancer Screening:     General: Screening Not Indicated      Hepatitis C Screening:    General: Screening Not Indicated      Hammad Argueta MD

## 2020-07-21 NOTE — PATIENT INSTRUCTIONS
Medicare Preventive Visit Patient Instructions  Thank you for completing your Welcome to Medicare Visit or Medicare Annual Wellness Visit today  Your next wellness visit will be due in one year (7/21/2021)  The screening/preventive services that you may require over the next 5-10 years are detailed below  Some tests may not apply to you based off risk factors and/or age  Screening tests ordered at today's visit but not completed yet may show as past due  Also, please note that scanned in results may not display below  Preventive Screenings:  Service Recommendations Previous Testing/Comments   Colorectal Cancer Screening  * Colonoscopy    * Fecal Occult Blood Test (FOBT)/Fecal Immunochemical Test (FIT)  * Fecal DNA/Cologuard Test  * Flexible Sigmoidoscopy Age: 54-65 years old   Colonoscopy: every 10 years (may be performed more frequently if at higher risk)  OR  FOBT/FIT: every 1 year  OR  Cologuard: every 3 years  OR  Sigmoidoscopy: every 5 years  Screening may be recommended earlier than age 48 if at higher risk for colorectal cancer  Also, an individualized decision between you and your healthcare provider will decide whether screening between the ages of 74-80 would be appropriate  Colonoscopy: Not on file  FOBT/FIT: Not on file  Cologuard: Not on file  Sigmoidoscopy: Not on file         Breast Cancer Screening Age: 36 years old  Frequency: every 1-2 years  Not required if history of left and right mastectomy Mammogram: 02/14/2019    Screening Current   Cervical Cancer Screening Between the ages of 21-29, pap smear recommended once every 3 years  Between the ages of 33-67, can perform pap smear with HPV co-testing every 5 years     Recommendations may differ for women with a history of total hysterectomy, cervical cancer, or abnormal pap smears in past  Pap Smear: Not on file    Screening Not Indicated   Hepatitis C Screening Once for adults born between 1945 and 1965  More frequently in patients at high risk for Hepatitis C Hep C Antibody: Not on file       Diabetes Screening 1-2 times per year if you're at risk for diabetes or have pre-diabetes Fasting glucose: 104 mg/dL   A1C: No results in last 5 years    Screening Current   Cholesterol Screening Once every 5 years if you don't have a lipid disorder  May order more often based on risk factors  Lipid panel: 07/15/2020    Screening Not Indicated  History Lipid Disorder     Other Preventive Screenings Covered by Medicare:  1  Abdominal Aortic Aneurysm (AAA) Screening: covered once if your at risk  You're considered to be at risk if you have a family history of AAA  2  Lung Cancer Screening: covers low dose CT scan once per year if you meet all of the following conditions: (1) Age 50-69; (2) No signs or symptoms of lung cancer; (3) Current smoker or have quit smoking within the last 15 years; (4) You have a tobacco smoking history of at least 30 pack years (packs per day multiplied by number of years you smoked); (5) You get a written order from a healthcare provider  3  Glaucoma Screening: covered annually if you're considered high risk: (1) You have diabetes OR (2) Family history of glaucoma OR (3)  aged 48 and older OR (3)  American aged 72 and older  3  Osteoporosis Screening: covered every 2 years if you meet one of the following conditions: (1) You're estrogen deficient and at risk for osteoporosis based off medical history and other findings; (2) Have a vertebral abnormality; (3) On glucocorticoid therapy for more than 3 months; (4) Have primary hyperparathyroidism; (5) On osteoporosis medications and need to assess response to drug therapy  · Last bone density test (DXA Scan): Not on file  5  HIV Screening: covered annually if you're between the age of 12-76  Also covered annually if you are younger than 13 and older than 72 with risk factors for HIV infection   For pregnant patients, it is covered up to 3 times per pregnancy  Immunizations:  Immunization Recommendations   Influenza Vaccine Annual influenza vaccination during flu season is recommended for all persons aged >= 6 months who do not have contraindications   Pneumococcal Vaccine (Prevnar and Pneumovax)  * Prevnar = PCV13  * Pneumovax = PPSV23   Adults 25-60 years old: 1-3 doses may be recommended based on certain risk factors  Adults 72 years old: Prevnar (PCV13) vaccine recommended followed by Pneumovax (PPSV23) vaccine  If already received PPSV23 since turning 65, then PCV13 recommended at least one year after PPSV23 dose  Hepatitis B Vaccine 3 dose series if at intermediate or high risk (ex: diabetes, end stage renal disease, liver disease)   Tetanus (Td) Vaccine - COST NOT COVERED BY MEDICARE PART B Following completion of primary series, a booster dose should be given every 10 years to maintain immunity against tetanus  Td may also be given as tetanus wound prophylaxis  Tdap Vaccine - COST NOT COVERED BY MEDICARE PART B Recommended at least once for all adults  For pregnant patients, recommended with each pregnancy  Shingles Vaccine (Shingrix) - COST NOT COVERED BY MEDICARE PART B  2 shot series recommended in those aged 48 and above     Health Maintenance Due:  There are no preventive care reminders to display for this patient  Immunizations Due:      Topic Date Due    DTaP,Tdap,and Td Vaccines (1 - Tdap) 04/08/1946    Pneumococcal Vaccine: 65+ Years (2 of 2 - PPSV23) 02/17/2018    Influenza Vaccine  07/01/2020     Advance Directives   What are advance directives? Advance directives are legal documents that state your wishes and plans for medical care  These plans are made ahead of time in case you lose your ability to make decisions for yourself  Advance directives can apply to any medical decision, such as the treatments you want, and if you want to donate organs  What are the types of advance directives?   There are many types of advance directives, and each state has rules about how to use them  You may choose a combination of any of the following:  · Living will: This is a written record of the treatment you want  You can also choose which treatments you do not want, which to limit, and which to stop at a certain time  This includes surgery, medicine, IV fluid, and tube feedings  · Durable power of  for healthcare [facility]): This is a written record that states who you want to make healthcare choices for you when you are unable to make them for yourself  This person, called a proxy, is usually a family member or a friend  You may choose more than 1 proxy  · Do not resuscitate (DNR) order:  A DNR order is used in case your heart stops beating or you stop breathing  It is a request not to have certain forms of treatment, such as CPR  A DNR order may be included in other types of advance directives  · Medical directive: This covers the care that you want if you are in a coma, near death, or unable to make decisions for yourself  You can list the treatments you want for each condition  Treatment may include pain medicine, surgery, blood transfusions, dialysis, IV or tube feedings, and a ventilator (breathing machine)  · Values history: This document has questions about your views, beliefs, and how you feel and think about life  This information can help others choose the care that you would choose  Why are advance directives important? An advance directive helps you control your care  Although spoken wishes may be used, it is better to have your wishes written down  Spoken wishes can be misunderstood, or not followed  Treatments may be given even if you do not want them  An advance directive may make it easier for your family to make difficult choices about your care  Urinary Incontinence   Urinary incontinence (UI)  is when you lose control of your bladder  UI develops because your bladder cannot store or empty urine properly   The 3 most common types of UI are stress incontinence, urge incontinence, or both  Medicines:   · May be given to help strengthen your bladder control  Report any side effects of medication to your healthcare provider  Do pelvic muscle exercises often:  Your pelvic muscles help you stop urinating  Squeeze these muscles tight for 5 seconds, then relax for 5 seconds  Gradually work up to squeezing for 10 seconds  Do 3 sets of 15 repetitions a day, or as directed  This will help strengthen your pelvic muscles and improve bladder control  Train your bladder:  Go to the bathroom at set times, such as every 2 hours, even if you do not feel the urge to go  You can also try to hold your urine when you feel the urge to go  For example, hold your urine for 5 minutes when you feel the urge to go  As that becomes easier, hold your urine for 10 minutes  Self-care:   · Keep a UI record  Write down how often you leak urine and how much you leak  Make a note of what you were doing when you leaked urine  · Drink liquids as directed  You may need to limit the amount of liquid you drink to help control your urine leakage  Do not drink any liquid right before you go to bed  Limit or do not have drinks that contain caffeine or alcohol  · Prevent constipation  Eat a variety of high-fiber foods  Good examples are high-fiber cereals, beans, vegetables, and whole-grain breads  Walking is the best way to trigger your intestines to have a bowel movement  · Exercise regularly and maintain a healthy weight  Weight loss and exercise will decrease pressure on your bladder and help you control your leakage  · Use a catheter as directed  to help empty your bladder  A catheter is a tiny, plastic tube that is put into your bladder to drain your urine  · Go to behavior therapy as directed  Behavior therapy may be used to help you learn to control your urge to urinate      Weight Management   Why it is important to manage your weight: Being overweight increases your risk of health conditions such as heart disease, high blood pressure, type 2 diabetes, and certain types of cancer  It can also increase your risk for osteoarthritis, sleep apnea, and other respiratory problems  Aim for a slow, steady weight loss  Even a small amount of weight loss can lower your risk of health problems  How to lose weight safely:  A safe and healthy way to lose weight is to eat fewer calories and get regular exercise  You can lose up about 1 pound a week by decreasing the number of calories you eat by 500 calories each day  Healthy meal plan for weight management:  A healthy meal plan includes a variety of foods, contains fewer calories, and helps you stay healthy  A healthy meal plan includes the following:  · Eat whole-grain foods more often  A healthy meal plan should contain fiber  Fiber is the part of grains, fruits, and vegetables that is not broken down by your body  Whole-grain foods are healthy and provide extra fiber in your diet  Some examples of whole-grain foods are whole-wheat breads and pastas, oatmeal, brown rice, and bulgur  · Eat a variety of vegetables every day  Include dark, leafy greens such as spinach, kale, prashant greens, and mustard greens  Eat yellow and orange vegetables such as carrots, sweet potatoes, and winter squash  · Eat a variety of fruits every day  Choose fresh or canned fruit (canned in its own juice or light syrup) instead of juice  Fruit juice has very little or no fiber  · Eat low-fat dairy foods  Drink fat-free (skim) milk or 1% milk  Eat fat-free yogurt and low-fat cottage cheese  Try low-fat cheeses such as mozzarella and other reduced-fat cheeses  · Choose meat and other protein foods that are low in fat  Choose beans or other legumes such as split peas or lentils  Choose fish, skinless poultry (chicken or turkey), or lean cuts of red meat (beef or pork)   Before you cook meat or poultry, cut off any visible fat    · Use less fat and oil  Try baking foods instead of frying them  Add less fat, such as margarine, sour cream, regular salad dressing and mayonnaise to foods  Eat fewer high-fat foods  Some examples of high-fat foods include french fries, doughnuts, ice cream, and cakes  · Eat fewer sweets  Limit foods and drinks that are high in sugar  This includes candy, cookies, regular soda, and sweetened drinks  Exercise:  Exercise at least 30 minutes per day on most days of the week  Some examples of exercise include walking, biking, dancing, and swimming  You can also fit in more physical activity by taking the stairs instead of the elevator or parking farther away from stores  Ask your healthcare provider about the best exercise plan for you  © Copyright "[employer] 2018 Information is for End User's use only and may not be sold, redistributed or otherwise used for commercial purposes   All illustrations and images included in CareNotes® are the copyrighted property of [employer]  or [address] [place] [address]

## 2020-07-21 NOTE — ASSESSMENT & PLAN NOTE
Varicose veins of both lower extremities are noted  I have recommended the patient consider wearing support stockings to decrease venous congestion  She indicates that during the summer months the stockings are too warm and uncomfortable but will consider wearing them through the fall winter and spring seasons    Also recommend that she elevate her legs when possible during the day

## 2020-07-21 NOTE — ASSESSMENT & PLAN NOTE
Patient continues to have some signs and symptoms of mild depression which date back to the loss of her  now over 1 year ago  Her she does not have any family is living locally  She was looking forward to seeing her daughter's this summer but unfortunately the COVID-19 has limited their ability to travel  One of her daughters will be moving back to [place] in the near future and will be living in [place] so should be able to see her more often  Patient has some occasional difficulties falling asleep    Suggested that she could use either melatonin 5 mg or can take a dose of her alprazolam 0 25 at bedtime to assist with falling asleep

## 2020-07-21 NOTE — PROGRESS NOTES
Assessment/Plan:    Varicose veins of both lower extremities  Varicose veins of both lower extremities are noted  I have recommended the patient consider wearing support stockings to decrease venous congestion  She indicates that during the summer months the stockings are too warm and uncomfortable but will consider wearing them through the fall winter and spring seasons  Also recommend that she elevate her legs when possible during the day    Hypertension  Assessment of the patient's hypertension today indicates good blood pressure control with a reading 128/72  I have encouraged her to continue her amlodipine 7 5 mg daily a follow-up assessment of her hypertension is requested in a period of 4 months  Reactive depression  Patient continues to have some signs and symptoms of mild depression which date back to the loss of her  now over 1 year ago  Her she does not have any family is living locally  She was looking forward to seeing her daughter's this summer but unfortunately the COVID-19 has limited their ability to travel  One of her daughters will be moving back to [place] in the near future and will be living in [place] so should be able to see her more often  Patient has some occasional difficulties falling asleep  Suggested that she could use either melatonin 5 mg or can take a dose of her alprazolam 0 25 at bedtime to assist with falling asleep    Flatulence  The patient commented today about increased flatulence and abdominal distension  She does not seem to have any constipation or change in overall bowel habits  We discussed in detail the foods that are likely to be flatulent producing and I have requested that she decrease these foods in her diet  Also recommended that she get gas -x which comes over-the-counter and take 1 tablet at each meal and at bedtime the ingredients and Gas-X are simethicone      Fibromyalgia  Fibromyalgia condition continues I suspect it flares up when she is under periods of stress or mild depression  We reviewed in detail the tests that were requested by her rheumatologist to evaluate for Lyme disease and connective tissue disease/autoimmune disease we see no evidence of active Lyme infection nor any evidence of autoimmune disease  Anxiety  Chronic anxiety symptoms recommend the continued use of alprazolam 0 25 mg q 8 hours p r n  for anxiety symptoms  No indication of abuse or dependence on this medication at the present time  Diagnoses and all orders for this visit:    Essential hypertension    Varicose veins of both lower extremities, unspecified whether complicated    Reactive depression    Flatulence    Fibromyalgia    Anxiety    Other orders  -     Discontinue: ALPRAZolam (XANAX) 0 25 mg tablet  -     Discontinue: fluconazole (DIFLUCAN) 150 mg tablet  -     Discontinue: fluticasone (FLONASE) 50 mcg/act nasal spray  -     Discontinue: lisinopril (ZESTRIL) 40 mg tablet  -     Discontinue: metroNIDAZOLE (FLAGYL) 500 mg tablet  -     Discontinue: Timolol Maleate 0 5 % (DAILY) SOLN  -     Discontinue: ZIOPTAN 0 0015 % SOLN  -     metroNIDAZOLE (FLAGYL) 500 mg tablet; TAKE 1 TABLET BY MOUTH TWICE A DAY FOR 7 DAYS        Subjective:      Patient ID: Cheikh Cantrell is a 82 y o  female  This 26-year-old female patient presents today for an annual complete physical examination and review of her current blood work  She has been able to avoid any exposure to COVID-19 and has no signs or symptoms of active infection  She understands the principles of social distancing and the use of a mask any time she leaves her home  She also understands good hand washing hygiene  She continues to struggle with depression symptoms at times since her  passed away  She has been more isolated with the COVID-19 quarantine situation  She also has not been able see her daughter's due to travel restrictions imposed by the COVID-19 outbreak      Patient continues to have generalized aches and pains especially in her back at this time  She has definite symptoms consistent with fibromyalgia  We did review with the patient today recent blood work requested by her rheumatologist indicating no evidence of autoimmune disease  She was also tested for Lyme infection and the testing was negative  The following portions of the patient's history were reviewed and updated as appropriate:   She  has a past medical history of Epistaxis and Saphenous vein thrombophlebitis, right (2012)  She   Patient Active Problem List    Diagnosis Date Noted    Other fatigue 05/08/2020    Nail fungus 01/17/2020    Varicose veins of both lower extremities 09/30/2019    Allergy 06/12/2019    Reactive depression 02/28/2019    Bunion of great toe 02/28/2019    Hammertoe of right foot 02/28/2019    Hyperlipidemia 10/31/2018    Glucose intolerance 10/31/2018    Flatulence 10/31/2018    Anxiety 10/09/2014    Fibromyalgia 10/09/2014    Hypertension 10/09/2014     She  has a past surgical history that includes Gallbladder surgery (2006)  Her family history includes Hypertension in her mother  She  reports that she has never smoked  She has never used smokeless tobacco  Her alcohol and drug histories are not on file    Current Outpatient Medications   Medication Sig Dispense Refill    ALPRAZolam (XANAX) 0 25 mg tablet Take 1 tablet (0 25 mg total) by mouth 2 (two) times a day 60 tablet 0    amLODIPine (NORVASC) 2 5 mg tablet Take 1 tablet (2 5 mg total) by mouth daily 30 tablet 4    amLODIPine (NORVASC) 5 mg tablet TAKE 1 TABLET BY MOUTH EVERY DAY 30 tablet 5    aspirin 81 MG tablet Take 1 tablet by mouth daily 3 times a week      cholecalciferol (VITAMIN D3) 1,000 units tablet Take by mouth      ciclopirox (PENLAC) 8 % solution Apply topically daily at bedtime 6 6 mL 2    cycloSPORINE (RESTASIS) 0 05 % ophthalmic emulsion Apply to eye      famotidine (PEPCID) 20 mg tablet Take 1 tablet by mouth daily      fluconazole (DIFLUCAN) 150 mg tablet TAKE 1 TABLET BY MOUTH FOR 1 DOSE AFTER FINISHING METRONIDAZOLE  MAY REPEAT IN 1 WEEK IF NEEDED      fluticasone (FLONASE) 50 mcg/act nasal spray SPRAY 1 SPRAY INTO EACH NOSTRIL TWICE A DAY 48 mL 3    lisinopril (ZESTRIL) 40 mg tablet TAKE 1 TABLET BY MOUTH EVERY DAY 90 tablet 3    loratadine (CLARITIN) 10 mg tablet Take 1 tablet by mouth daily      neomycin-polymyxin-dexamethasone (MAXITROL) 0 35%-10,000 units/g-0 1% APPLY TO LIDS OF BOTH EYES 2 TIMES DAILY      PREMARIN vaginal cream APPLY 1/4 INCH WITH FINGER TO VAGINA MONDAY AND FRIDAY AT BEDTIME  2    timolol (TIMOPTIC) 0 5 % ophthalmic solution INSTILL 1 DROP INTO RIGHT EYE EVERY DAY  2    ZIOPTAN 0 0015 % ophthalmic solution ADMINISTER 1 DROP OPHTHALMICALLY AT BEDTIME IN THE RIGHT EYE, BRAND NECESSARY  6    diclofenac sodium (VOLTAREN) 1 % 2 g 4 (four) times a day   2    metroNIDAZOLE (FLAGYL) 500 mg tablet TAKE 1 TABLET BY MOUTH TWICE A DAY FOR 7 DAYS      omeprazole (PriLOSEC) 10 mg delayed release capsule Take 20 mg by mouth daily      prochlorperazine (COMPAZINE) 10 mg tablet Take by mouth       No current facility-administered medications for this visit       Review of Systems   Constitutional: Positive for fatigue  Gastrointestinal: Positive for abdominal distention  Intestinal bloating   Musculoskeletal: Positive for arthralgias, back pain and myalgias  Psychiatric/Behavioral: Positive for dysphoric mood and sleep disturbance  The patient is nervous/anxious  All other systems reviewed and are negative  Objective:      /72   Pulse 105   Temp 98 °F (36 7 °C)   Ht 5' 4" (1 626 m)   Wt 76 2 kg (168 lb)   SpO2 93%   BMI 28 84 kg/m²          Physical Exam   Constitutional: She is oriented to person, place, and time  Vital signs are normal  She appears well-developed and well-nourished  She is cooperative  No distress     HENT:   Right Ear: Hearing, tympanic membrane, external ear and ear canal normal    Left Ear: Hearing, tympanic membrane, external ear and ear canal normal    Nose: Nose normal  No mucosal edema  Mouth/Throat: Uvula is midline, oropharynx is clear and moist and mucous membranes are normal  No oropharyngeal exudate  Eyes: Pupils are equal, round, and reactive to light  Conjunctivae and lids are normal  Right eye exhibits no discharge  Left eye exhibits no discharge  No scleral icterus  Neck: No JVD present  Carotid bruit is not present  No thyromegaly present  Cardiovascular: Normal rate, regular rhythm, normal heart sounds and intact distal pulses  No murmur heard  Pulmonary/Chest: Effort normal and breath sounds normal  No stridor  No respiratory distress  She has no wheezes  Abdominal: Soft  Normal appearance and bowel sounds are normal  She exhibits distension  She exhibits no mass  There is no tenderness  There is no rebound and no guarding  Increased tympanic percussion of the abdomen consistent with increased intestinal gas   Musculoskeletal: Normal range of motion  She exhibits deformity  She exhibits no edema  Arthritic changes of the hands and feet are noted   Lymphadenopathy:     She has no cervical adenopathy  Neurological: She is alert and oriented to person, place, and time  She has normal reflexes  She displays normal reflexes  No cranial nerve deficit  Coordination normal    Skin: Skin is warm, dry and intact  No rash noted  She is not diaphoretic  No erythema  No pallor  Psychiatric: She has a normal mood and affect  Her speech is normal and behavior is normal  Judgment and thought content normal  Cognition and memory are normal    Vitals reviewed  BMI Counseling: Body mass index is 28 84 kg/m²  The BMI is above normal  Nutrition recommendations include reducing portion sizes, consuming healthier snacks and moderation in carbohydrate intake

## 2020-07-21 NOTE — ASSESSMENT & PLAN NOTE
Fibromyalgia condition continues I suspect it flares up when she is under periods of stress or mild depression  We reviewed in detail the tests that were requested by her rheumatologist to evaluate for Lyme disease and connective tissue disease/autoimmune disease we see no evidence of active Lyme infection nor any evidence of autoimmune disease

## 2020-07-24 DIAGNOSIS — F41.9 ANXIETY: ICD-10-CM

## 2020-07-24 RX ORDER — ALPRAZOLAM 0.25 MG/1
0.25 TABLET ORAL 2 TIMES DAILY
Qty: 60 TABLET | Refills: 0 | Status: SHIPPED | OUTPATIENT
Start: 2020-07-24 | End: 2020-08-25 | Stop reason: SDUPTHER

## 2020-08-09 DIAGNOSIS — I10 ESSENTIAL HYPERTENSION: ICD-10-CM

## 2020-08-11 ENCOUNTER — OFFICE VISIT (OUTPATIENT)
Dept: INTERNAL MEDICINE CLINIC | Facility: CLINIC | Age: 85
End: 2020-08-11
Payer: MEDICARE

## 2020-08-11 VITALS
TEMPERATURE: 98.1 F | RESPIRATION RATE: 16 BRPM | DIASTOLIC BLOOD PRESSURE: 82 MMHG | HEIGHT: 64 IN | HEART RATE: 103 BPM | OXYGEN SATURATION: 96 % | BODY MASS INDEX: 28.84 KG/M2 | SYSTOLIC BLOOD PRESSURE: 136 MMHG

## 2020-08-11 DIAGNOSIS — I83.93 VARICOSE VEINS OF BOTH LOWER EXTREMITIES, UNSPECIFIED WHETHER COMPLICATED: Primary | ICD-10-CM

## 2020-08-11 PROCEDURE — 1170F FXNL STATUS ASSESSED: CPT | Performed by: NURSE PRACTITIONER

## 2020-08-11 PROCEDURE — 99213 OFFICE O/P EST LOW 20 MIN: CPT | Performed by: NURSE PRACTITIONER

## 2020-08-11 PROCEDURE — 1160F RVW MEDS BY RX/DR IN RCRD: CPT | Performed by: NURSE PRACTITIONER

## 2020-08-11 PROCEDURE — 1036F TOBACCO NON-USER: CPT | Performed by: NURSE PRACTITIONER

## 2020-08-11 PROCEDURE — 3075F SYST BP GE 130 - 139MM HG: CPT | Performed by: NURSE PRACTITIONER

## 2020-08-11 PROCEDURE — 4040F PNEUMOC VAC/ADMIN/RCVD: CPT | Performed by: NURSE PRACTITIONER

## 2020-08-11 PROCEDURE — 3079F DIAST BP 80-89 MM HG: CPT | Performed by: NURSE PRACTITIONER

## 2020-08-11 RX ORDER — ERGOCALCIFEROL 1.25 MG/1
CAPSULE ORAL
COMMUNITY
Start: 2020-07-22

## 2020-08-11 NOTE — ASSESSMENT & PLAN NOTE
Pt has documented history of varicose veins of both legs, though today on exam only the R leg presents with findings  She has tried to manage with compression stockings and elevation but continues with intermittent pain  Will refer to vascular surgery to discuss additional treatment options

## 2020-08-11 NOTE — PROGRESS NOTES
Assessment/Plan:    Varicose veins of both lower extremities  Pt has documented history of varicose veins of both legs, though today on exam only the R leg presents with findings  She has tried to manage with compression stockings and elevation but continues with intermittent pain  Will refer to vascular surgery to discuss additional treatment options  Diagnoses and all orders for this visit:    Varicose veins of both lower extremities, unspecified whether complicated  -     Ambulatory referral to Vascular Surgery; Future    Other orders  -     ergocalciferol (VITAMIN D2) 50,000 units; TAKE 1 CAPSULE BY MOUTH ONE TIME PER WEEK          Subjective:      Patient ID: Lucy Lozano is a 82 y o  female  Pt is a 82 y o  y/o female who is seen today for evaluation of her varicose veins  She has had these for several years in the RLE and she has tried to manage with compression stockings and elevation  She continues to complain of intermittent pain, sometimes causing a problem with ambulation  She would like to know what other treatment options are available to her  The following portions of the patient's history were reviewed and updated as appropriate: allergies, current medications, past family history, past medical history, past social history, past surgical history and problem list     Review of Systems   Constitutional: Negative for activity change, appetite change, chills and fever  Respiratory: Negative for chest tightness and shortness of breath  Cardiovascular: Negative for chest pain and leg swelling  Skin: Negative for color change, rash and wound  Varicose veins RLE   Neurological: Negative for weakness and numbness  Objective:      /82   Pulse 103   Temp 98 1 °F (36 7 °C)   Resp 16   Ht 5' 4" (1 626 m)   SpO2 96%   BMI 28 84 kg/m²          Physical Exam  Vitals signs reviewed  Constitutional:       Appearance: She is well-developed   She is not diaphoretic  Eyes:      General: Lids are normal       Conjunctiva/sclera: Conjunctivae normal    Cardiovascular:      Pulses: Normal pulses  Comments: Varicose veins noted on the R lower leg  Pulmonary:      Effort: Pulmonary effort is normal    Musculoskeletal: Normal range of motion  Right lower leg: No edema  Left lower leg: No edema  Comments: R second toe deformity noted  Skin:     General: Skin is dry  Neurological:      Mental Status: She is alert and oriented to person, place, and time  Sensory: Sensation is intact  Motor: Motor function is intact  Psychiatric:         Attention and Perception: Attention normal          Mood and Affect: Mood normal          Speech: Speech normal          Behavior: Behavior normal  Behavior is cooperative  Thought Content:  Thought content normal          Cognition and Memory: Cognition normal          Judgment: Judgment normal

## 2020-08-13 DIAGNOSIS — I10 ESSENTIAL HYPERTENSION: ICD-10-CM

## 2020-08-13 RX ORDER — AMLODIPINE BESYLATE 2.5 MG/1
2.5 TABLET ORAL DAILY
Qty: 30 TABLET | Refills: 4
Start: 2020-08-13 | End: 2020-12-23 | Stop reason: SDUPTHER

## 2020-08-16 RX ORDER — AMLODIPINE BESYLATE 5 MG/1
TABLET ORAL
Qty: 30 TABLET | Refills: 5 | Status: SHIPPED | OUTPATIENT
Start: 2020-08-16 | End: 2020-08-20 | Stop reason: DRUGHIGH

## 2020-08-19 ENCOUNTER — TELEPHONE (OUTPATIENT)
Dept: VASCULAR SURGERY | Facility: CLINIC | Age: 85
End: 2020-08-19

## 2020-08-19 NOTE — TELEPHONE ENCOUNTER

## 2020-08-19 NOTE — PATIENT INSTRUCTIONS
We discussed the pathophysiology of varicose veins and venous insufficiency  Will start with conservative measures to aid in symptom relief and progression of disease  PLAN:  -compression stockings 20-30mmHg Rx given, to be worn during waking hours and removed at bedtime  -elevate legs throughout the day as able  -exercise daily as tolerated  -continue healthy lifestyle including low-fat low-chol, low-sodium diet  -apply moisturizer daily to both legs/feet  -LEVDR in 3 months  -return for f/u with Vascular Surgeon after testing to discuss treatment plan  -if you have any questions or concerns, please call our office to discuss      Varicose Veins   AMBULATORY CARE:   Varicose veins  are veins that become large, twisted, and swollen  They are common on the back of the calves, knees, and thighs  Varicose veins are caused by valves in your veins that do not work properly  This causes blood to collect and increase pressure in the veins of your legs  The increased pressure causes your veins to stretch, get larger, swell, and twist        Common symptoms include the following: Your symptoms may be worse after you stand or sit for long periods of time  You may have any of the following:  · Blue, purple, or bulging veins in your legs     · Pain, swelling, or muscle cramps in your legs    · Feeling of fatigue or heaviness in your legs    · Cramping in your legs  Seek care immediately if:   · You have a wound that does not heal or is infected  · You have an injury that has broken your skin and caused your varicose veins to bleed  · Your leg is swollen and hard  · You notice that your legs or feet are turning blue or black  · Your leg feels warm, tender, and painful  It may look swollen and red  Contact your healthcare provider if:   · You have pain in your leg that does not go away or gets worse  · You notice sudden large bruising on your legs  · You have a rash on your leg       · Your symptoms keep you from doing your daily activities  · You have questions or concerns about your condition or care  Treatment of varicose veins  aims to decrease symptoms, improve appearance, and prevent further problems  Treatment will depend on which veins are affected and how severe your condition is  You may need procedures to treat or remove your varicose veins  For example, your healthcare provider may inject a solution or use a laser to close the varicose veins  Surgery to remove long veins may also be done  Ask your healthcare provider for more information about procedures used to treat varicose veins  Manage varicose veins:   · Do not sit or stand for long periods of time  This can cause the blood to collect in your legs and make your symptoms worse  Bend or rotate your ankles several times every hour  Walk around for a few minutes every hour to get blood moving in your legs  · Do not cross your legs when you sit  This decreases blood flow to your feet and can make your symptoms worse  · Do not wear tight clothing or shoes  Do not wear high-heeled shoes  Do not wear clothes that are tight around the waist or knees  · Maintain a healthy weight  Being overweight or obese can make your varicose veins worse  Ask your healthcare provider how much you should weigh  Ask him or her to help you create a weight loss plan if you are overweight  · Wear pressure stockings as directed  The stockings are tight and put pressure on your legs  They improve blood flow and help prevent clots  · Elevate your legs  Keep them above the level of your heart for 15 to 30 minutes several times a day  You can also prop the end of your bed up slightly to elevate your legs while you sleep  This will help blood to flow back to your heart  · Get regular exercise  Talk to your healthcare provider about the best exercise plan for you  Exercise can improve blood flow to your legs and feet    Follow up with your healthcare provider as directed:  Write down your questions so you remember to ask them during your visits  © 2017 [employer] Information is for End User's use only and may not be sold, redistributed or otherwise used for commercial purposes  All illustrations and images included in CareNotes® are the copyrighted property of [employer]  or [employer]  The above information is an  only  It is not intended as medical advice for individual conditions or treatments  Talk to your doctor, nurse or pharmacist before following any medical regimen to see if it is safe and effective for you

## 2020-08-20 ENCOUNTER — CONSULT (OUTPATIENT)
Dept: VASCULAR SURGERY | Facility: CLINIC | Age: 85
End: 2020-08-20
Payer: MEDICARE

## 2020-08-20 VITALS
BODY MASS INDEX: 29.02 KG/M2 | SYSTOLIC BLOOD PRESSURE: 106 MMHG | TEMPERATURE: 97.1 F | DIASTOLIC BLOOD PRESSURE: 64 MMHG | HEART RATE: 50 BPM | HEIGHT: 64 IN | WEIGHT: 170 LBS

## 2020-08-20 DIAGNOSIS — I83.813 VARICOSE VEINS OF BOTH LOWER EXTREMITIES WITH PAIN: ICD-10-CM

## 2020-08-20 DIAGNOSIS — M79.7 FIBROMYALGIA: ICD-10-CM

## 2020-08-20 DIAGNOSIS — I10 ESSENTIAL HYPERTENSION: Primary | ICD-10-CM

## 2020-08-20 PROCEDURE — 99204 OFFICE O/P NEW MOD 45 MIN: CPT | Performed by: NURSE PRACTITIONER

## 2020-08-20 NOTE — PROGRESS NOTES
Assessment/Plan:    Varicose veins of both lower extremities  81yo F, overweight with BMI 29 18kg/m2, pregnancy hx, HTN, presents to the vascular office for evaluation of right lower extremity symptomatic varicose veins  + aching, tiredness, heaviness and pain to the right lower extremity  Some relief with leg elevation ibuprofen  We discussed the pathophysiology of varicose veins and venous insufficiency  Patient expressed understanding  Will start with conservative measures to aid in symptom relief and progression of disease  PLAN:  -compression stockings 20-30mmHg Rx given, to be worn during waking hours and removed at bedtime  -elevate legs throughout the day as able  -exercise daily as tolerated  -continue healthy lifestyle including low-fat low-chol, low-sodium diet  -apply moisturizer daily to both legs/feet  -continue with optimal BP control per PCP  -LEVDR in 3 months  -return for f/u with Vascular Surgeon after testing to discuss treatment plan  -if you have any questions or concerns, please call our office to discuss         Diagnoses and all orders for this visit:    Essential hypertension    Varicose veins of both lower extremities with pain  -     Compression Stocking  -     Ambulatory referral to Vascular Surgery  -     VAS reflux lower limb venous duplex study with reflux assesment, unilateral; Future    Fibromyalgia          Subjective:      Patient ID: Narinder Clark is a 82 y o  female  New patient, referred by PCP for VV  Patient reports RLE bulging veins w/ associated pain, aching, tiredness and heaviness  She does get some relief w/ leg elevation  Patient does not wear compression and has not had any testing done  Joe Schrader is a 81yo F, overweight with BMI 29 18kg/m2, pregnancy hx, HTN, presents to the vascular office for evaluation of right lower extremity symptomatic varicose veins      She reports varicose veins have been present for several years but recently within the past year she has had an increase in aching, tiredness, heaviness and pain to the right lower extremity  She reports a fairly sedentary life, with COVID she has been less active  She does reports some relief to the leg discomfort with leg elevation and or ibuprofen  She is not currently wearing any compression stockings and does not do much in the activity or exercise  She denies any itching, burning or cramping to the lower extremities, denies any dermatitis or leg swelling  She denies any history of leg injury or surgery denies any history of blood clots  She does have a family history, her father and daughter both with varicose veins  The following portions of the patient's history were reviewed and updated as appropriate: allergies, current medications, past family history, past medical history, past social history, past surgical history and problem list     Review of Systems   Constitutional: Negative  HENT: Negative  Eyes: Negative  Respiratory: Negative  Cardiovascular:        Painful veins   Gastrointestinal: Negative  Endocrine: Negative  Genitourinary: Negative  Musculoskeletal:        Leg pain   Skin: Negative  Allergic/Immunologic: Negative  Neurological: Negative  Hematological: Negative  Psychiatric/Behavioral: Negative  Objective:      /64 (BP Location: Right arm, Patient Position: Sitting)   Pulse (!) 50   Temp (!) 97 1 °F (36 2 °C) (Tympanic)   Ht 5' 4" (1 626 m)   Wt 77 1 kg (170 lb)   BMI 29 18 kg/m²          Physical Exam  Vitals signs and nursing note reviewed  Constitutional:       Appearance: Normal appearance  She is well-developed and normal weight  HENT:      Head: Normocephalic and atraumatic  Eyes:      General: No scleral icterus  Pupils: Pupils are equal, round, and reactive to light  Neck:      Musculoskeletal: Normal range of motion     Cardiovascular:      Rate and Rhythm: Normal rate and regular rhythm  Pulses: Normal pulses  Heart sounds: Normal heart sounds  Pulmonary:      Effort: Pulmonary effort is normal       Breath sounds: Normal breath sounds  Abdominal:      General: Abdomen is flat  Bowel sounds are normal       Palpations: Abdomen is soft  Musculoskeletal: Normal range of motion  Skin:     General: Skin is warm and dry  Capillary Refill: Capillary refill takes less than 2 seconds  Neurological:      General: No focal deficit present  Mental Status: She is alert and oriented to person, place, and time  Motor: Motor function is intact  Psychiatric:         Attention and Perception: Attention normal          Mood and Affect: Mood and affect normal          Speech: Speech normal          Behavior: Behavior normal  Behavior is cooperative  Thought Content: Thought content normal          Cognition and Memory: Cognition normal          Judgment: Judgment normal          RIGHT LEG:              I have reviewed and made appropriate changes to the review of systems input by the medical assistant      Vitals:    08/20/20 1305   BP: 106/64   BP Location: Right arm   Patient Position: Sitting   Pulse: (!) 50   Temp: (!) 97 1 °F (36 2 °C)   TempSrc: Tympanic   Weight: 77 1 kg (170 lb)   Height: 5' 4" (1 626 m)       Patient Active Problem List   Diagnosis    Anxiety    Fibromyalgia    Hypertension    Hyperlipidemia    Glucose intolerance    Flatulence    Reactive depression    Bunion of great toe    Hammertoe of right foot    Allergy    Varicose veins of both lower extremities    Nail fungus    Other fatigue       Past Surgical History:   Procedure Laterality Date    GALLBLADDER SURGERY  2006       Family History   Problem Relation Age of Onset    Hypertension Mother        Social History     Socioeconomic History    Marital status: /Civil Union     Spouse name: Not on file    Number of children: Not on file    Years of education: Not on file    Highest education level: Not on file   Occupational History    Not on file   Social Needs    Financial resource strain: Not on file    Food insecurity     Worry: Not on file     Inability: Not on file    Transportation needs     Medical: Not on file     Non-medical: Not on file   Tobacco Use    Smoking status: Never Smoker    Smokeless tobacco: Never Used   Substance and Sexual Activity    Alcohol use: Not on file    Drug use: Not on file    Sexual activity: Not on file   Lifestyle    Physical activity     Days per week: Not on file     Minutes per session: Not on file    Stress: Not on file   Relationships    Social connections     Talks on phone: Not on file     Gets together: Not on file     Attends Holiness service: Not on file     Active member of club or organization: Not on file     Attends meetings of clubs or organizations: Not on file     Relationship status: Not on file    Intimate partner violence     Fear of current or ex partner: Not on file     Emotionally abused: Not on file     Physically abused: Not on file     Forced sexual activity: Not on file   Other Topics Concern    Not on file   Social History Narrative    Not on file       Allergies   Allergen Reactions    Acetazolamide     Ciprofloxacin Headache and Nausea Only    Nitrofurantoin     Pollen Extract     Sulfa Antibiotics     Keflex [Cephalexin] Itching         Current Outpatient Medications:     ALPRAZolam (XANAX) 0 25 mg tablet, Take 1 tablet (0 25 mg total) by mouth 2 (two) times a day, Disp: 60 tablet, Rfl: 0    amLODIPine (NORVASC) 2 5 mg tablet, Take 1 tablet (2 5 mg total) by mouth daily, Disp: 30 tablet, Rfl: 4    aspirin 81 MG tablet, Take 1 tablet by mouth daily 3 times a week, Disp: , Rfl:     cholecalciferol (VITAMIN D3) 1,000 units tablet, Take by mouth, Disp: , Rfl:     ciclopirox (PENLAC) 8 % solution, Apply topically daily at bedtime, Disp: 6 6 mL, Rfl: 2    cycloSPORINE (RESTASIS) 0 05 % ophthalmic emulsion, Apply to eye, Disp: , Rfl:     diclofenac sodium (VOLTAREN) 1 %, 2 g 4 (four) times a day , Disp: , Rfl: 2    ergocalciferol (VITAMIN D2) 50,000 units, TAKE 1 CAPSULE BY MOUTH ONE TIME PER WEEK, Disp: , Rfl:     famotidine (PEPCID) 20 mg tablet, Take 1 tablet by mouth daily, Disp: , Rfl:     fluticasone (FLONASE) 50 mcg/act nasal spray, SPRAY 1 SPRAY INTO EACH NOSTRIL TWICE A DAY, Disp: 48 mL, Rfl: 3    lisinopril (ZESTRIL) 40 mg tablet, TAKE 1 TABLET BY MOUTH EVERY DAY, Disp: 90 tablet, Rfl: 3    loratadine (CLARITIN) 10 mg tablet, Take 1 tablet by mouth daily, Disp: , Rfl:     PREMARIN vaginal cream, APPLY 1/4 INCH WITH FINGER TO VAGINA MONDAY AND FRIDAY AT BEDTIME, Disp: , Rfl: 2    timolol (TIMOPTIC) 0 5 % ophthalmic solution, INSTILL 1 DROP INTO RIGHT EYE EVERY DAY, Disp: , Rfl: 2    ZIOPTAN 0 0015 % ophthalmic solution, ADMINISTER 1 DROP OPHTHALMICALLY AT BEDTIME IN THE RIGHT EYE, BRAND NECESSARY, Disp: , Rfl: 6

## 2020-08-20 NOTE — ASSESSMENT & PLAN NOTE
83yo F, overweight with BMI 29 18kg/m2, pregnancy hx, HTN, presents to the vascular office for evaluation of right lower extremity symptomatic varicose veins  + aching, tiredness, heaviness and pain to the right lower extremity  Some relief with leg elevation ibuprofen  We discussed the pathophysiology of varicose veins and venous insufficiency  Patient expressed understanding  Will start with conservative measures to aid in symptom relief and progression of disease      PLAN:  -compression stockings 20-30mmHg Rx given, to be worn during waking hours and removed at bedtime  -elevate legs throughout the day as able  -exercise daily as tolerated  -continue healthy lifestyle including low-fat low-chol, low-sodium diet  -apply moisturizer daily to both legs/feet  -continue with optimal BP control per PCP  -LEVDR in 3 months  -return for f/u with Vascular Surgeon after testing to discuss treatment plan  -if you have any questions or concerns, please call our office to discuss

## 2020-08-25 DIAGNOSIS — F41.9 ANXIETY: ICD-10-CM

## 2020-08-25 RX ORDER — ALPRAZOLAM 0.25 MG/1
0.25 TABLET ORAL 2 TIMES DAILY
Qty: 60 TABLET | Refills: 0 | Status: SHIPPED | OUTPATIENT
Start: 2020-08-25 | End: 2020-09-23 | Stop reason: SDUPTHER

## 2020-09-23 DIAGNOSIS — F41.9 ANXIETY: ICD-10-CM

## 2020-09-23 RX ORDER — ALPRAZOLAM 0.25 MG/1
0.25 TABLET ORAL 2 TIMES DAILY
Qty: 60 TABLET | Refills: 0 | Status: SHIPPED | OUTPATIENT
Start: 2020-09-23 | End: 2020-10-23 | Stop reason: SDUPTHER

## 2020-09-23 NOTE — TELEPHONE ENCOUNTER
Call returned to the patient regarding the discomfort in her varicose veins we reviewed options for treatment including the use of compression stockings and Voltaren gel as well as elevating her leg for 20 minutes 3 times a day

## 2020-09-23 NOTE — TELEPHONE ENCOUNTER
Pt wanted to know if she could get some advice for pain she is having in her Left lower leg (the varicose vein )t is swollen and hurts  She can be reached at [phone]

## 2020-10-13 ENCOUNTER — CLINICAL SUPPORT (OUTPATIENT)
Dept: INTERNAL MEDICINE CLINIC | Facility: CLINIC | Age: 85
End: 2020-10-13
Payer: MEDICARE

## 2020-10-13 DIAGNOSIS — Z23 ENCOUNTER FOR IMMUNIZATION: Primary | ICD-10-CM

## 2020-10-13 PROCEDURE — G0008 ADMIN INFLUENZA VIRUS VAC: HCPCS

## 2020-10-13 PROCEDURE — 90662 IIV NO PRSV INCREASED AG IM: CPT

## 2020-10-23 DIAGNOSIS — F41.9 ANXIETY: ICD-10-CM

## 2020-10-23 RX ORDER — ALPRAZOLAM 0.25 MG/1
0.25 TABLET ORAL 2 TIMES DAILY
Qty: 60 TABLET | Refills: 0 | Status: SHIPPED | OUTPATIENT
Start: 2020-10-23 | End: 2020-11-20 | Stop reason: SDUPTHER

## 2020-10-27 ENCOUNTER — TELEPHONE (OUTPATIENT)
Dept: INTERNAL MEDICINE CLINIC | Facility: CLINIC | Age: 85
End: 2020-10-27

## 2020-10-27 DIAGNOSIS — N30.00 ACUTE CYSTITIS WITHOUT HEMATURIA: Primary | ICD-10-CM

## 2020-10-27 RX ORDER — AMPICILLIN 500 MG/1
500 CAPSULE ORAL 4 TIMES DAILY
Qty: 28 CAPSULE | Refills: 0 | Status: SHIPPED | OUTPATIENT
Start: 2020-10-27 | End: 2021-10-18

## 2020-10-29 DIAGNOSIS — T78.40XD ALLERGY, SUBSEQUENT ENCOUNTER: ICD-10-CM

## 2020-10-29 RX ORDER — FLUTICASONE PROPIONATE 50 MCG
1 SPRAY, SUSPENSION (ML) NASAL 2 TIMES DAILY
Qty: 1 BOTTLE | Refills: 3 | Status: SHIPPED | OUTPATIENT
Start: 2020-10-29 | End: 2020-11-16

## 2020-11-02 ENCOUNTER — TELEPHONE (OUTPATIENT)
Dept: INTERNAL MEDICINE CLINIC | Facility: CLINIC | Age: 85
End: 2020-11-02

## 2020-11-05 ENCOUNTER — OFFICE VISIT (OUTPATIENT)
Dept: INTERNAL MEDICINE CLINIC | Facility: CLINIC | Age: 85
End: 2020-11-05
Payer: MEDICARE

## 2020-11-05 VITALS
DIASTOLIC BLOOD PRESSURE: 72 MMHG | WEIGHT: 171 LBS | TEMPERATURE: 98.4 F | BODY MASS INDEX: 29.19 KG/M2 | OXYGEN SATURATION: 98 % | HEART RATE: 97 BPM | HEIGHT: 64 IN | SYSTOLIC BLOOD PRESSURE: 128 MMHG

## 2020-11-05 DIAGNOSIS — M25.512 CHRONIC LEFT SHOULDER PAIN: ICD-10-CM

## 2020-11-05 DIAGNOSIS — G89.29 CHRONIC LEFT SHOULDER PAIN: ICD-10-CM

## 2020-11-05 DIAGNOSIS — I83.813 VARICOSE VEINS OF BOTH LOWER EXTREMITIES WITH PAIN: ICD-10-CM

## 2020-11-05 DIAGNOSIS — I10 ESSENTIAL HYPERTENSION: Primary | ICD-10-CM

## 2020-11-05 DIAGNOSIS — M79.7 FIBROMYALGIA: ICD-10-CM

## 2020-11-05 DIAGNOSIS — F41.9 ANXIETY: ICD-10-CM

## 2020-11-05 PROCEDURE — 99214 OFFICE O/P EST MOD 30 MIN: CPT | Performed by: INTERNAL MEDICINE

## 2020-11-05 RX ORDER — TIMOLOL 5.12 MG/ML
SOLUTION/ DROPS OPHTHALMIC
COMMUNITY

## 2020-11-09 ENCOUNTER — APPOINTMENT (OUTPATIENT)
Dept: RADIOLOGY | Age: 85
End: 2020-11-09
Payer: MEDICARE

## 2020-11-09 DIAGNOSIS — M25.512 CHRONIC LEFT SHOULDER PAIN: ICD-10-CM

## 2020-11-09 DIAGNOSIS — G89.29 CHRONIC LEFT SHOULDER PAIN: ICD-10-CM

## 2020-11-09 PROCEDURE — 73030 X-RAY EXAM OF SHOULDER: CPT

## 2020-11-16 DIAGNOSIS — T78.40XD ALLERGY, SUBSEQUENT ENCOUNTER: ICD-10-CM

## 2020-11-16 RX ORDER — FLUTICASONE PROPIONATE 50 MCG
SPRAY, SUSPENSION (ML) NASAL
Qty: 48 ML | Refills: 3 | Status: SHIPPED | OUTPATIENT
Start: 2020-11-16 | End: 2021-12-06

## 2020-11-20 DIAGNOSIS — F41.9 ANXIETY: ICD-10-CM

## 2020-11-20 RX ORDER — ALPRAZOLAM 0.25 MG/1
0.25 TABLET ORAL 2 TIMES DAILY
Qty: 60 TABLET | Refills: 0 | Status: SHIPPED | OUTPATIENT
Start: 2020-11-20 | End: 2020-12-21 | Stop reason: SDUPTHER

## 2020-11-29 ENCOUNTER — APPOINTMENT (EMERGENCY)
Dept: RADIOLOGY | Facility: HOSPITAL | Age: 85
End: 2020-11-29
Payer: MEDICARE

## 2020-11-29 ENCOUNTER — OFFICE VISIT (OUTPATIENT)
Dept: URGENT CARE | Age: 85
End: 2020-11-29
Payer: MEDICARE

## 2020-11-29 ENCOUNTER — APPOINTMENT (OUTPATIENT)
Dept: RADIOLOGY | Age: 85
End: 2020-11-29
Attending: PHYSICIAN ASSISTANT
Payer: MEDICARE

## 2020-11-29 ENCOUNTER — HOSPITAL ENCOUNTER (EMERGENCY)
Facility: HOSPITAL | Age: 85
Discharge: HOME | End: 2020-11-30
Attending: EMERGENCY MEDICINE
Payer: MEDICARE

## 2020-11-29 VITALS
HEIGHT: 64 IN | HEART RATE: 118 BPM | OXYGEN SATURATION: 96 % | BODY MASS INDEX: 29.02 KG/M2 | TEMPERATURE: 98.6 F | DIASTOLIC BLOOD PRESSURE: 85 MMHG | SYSTOLIC BLOOD PRESSURE: 160 MMHG | WEIGHT: 170 LBS | RESPIRATION RATE: 17 BRPM

## 2020-11-29 DIAGNOSIS — S43.014A ANTERIOR DISLOCATION OF RIGHT SHOULDER, INITIAL ENCOUNTER: Primary | ICD-10-CM

## 2020-11-29 DIAGNOSIS — M25.511 ACUTE PAIN OF RIGHT SHOULDER: ICD-10-CM

## 2020-11-29 DIAGNOSIS — I10 ESSENTIAL HYPERTENSION: ICD-10-CM

## 2020-11-29 PROCEDURE — 99213 OFFICE O/P EST LOW 20 MIN: CPT | Performed by: PHYSICIAN ASSISTANT

## 2020-11-29 PROCEDURE — 99284 EMERGENCY DEPT VISIT MOD MDM: CPT | Performed by: EMERGENCY MEDICINE

## 2020-11-29 PROCEDURE — 23650 CLTX SHO DSLC W/MNPJ WO ANES: CPT | Performed by: EMERGENCY MEDICINE

## 2020-11-29 PROCEDURE — 99152 MOD SED SAME PHYS/QHP 5/>YRS: CPT | Performed by: EMERGENCY MEDICINE

## 2020-11-29 PROCEDURE — 99283 EMERGENCY DEPT VISIT LOW MDM: CPT

## 2020-11-29 PROCEDURE — 73030 X-RAY EXAM OF SHOULDER: CPT

## 2020-11-29 PROCEDURE — G0463 HOSPITAL OUTPT CLINIC VISIT: HCPCS | Performed by: PHYSICIAN ASSISTANT

## 2020-11-29 PROCEDURE — 73020 X-RAY EXAM OF SHOULDER: CPT

## 2020-11-29 RX ORDER — LIDOCAINE HYDROCHLORIDE 10 MG/ML
10 INJECTION, SOLUTION EPIDURAL; INFILTRATION; INTRACAUDAL; PERINEURAL ONCE
Status: COMPLETED | OUTPATIENT
Start: 2020-11-29 | End: 2020-11-29

## 2020-11-29 RX ORDER — PROPOFOL 10 MG/ML
150 INJECTION, EMULSION INTRAVENOUS ONCE
Status: COMPLETED | OUTPATIENT
Start: 2020-11-29 | End: 2020-11-29

## 2020-11-29 RX ORDER — DIAZEPAM 5 MG/1
5 TABLET ORAL ONCE
Status: DISCONTINUED | OUTPATIENT
Start: 2020-11-29 | End: 2020-11-29

## 2020-11-29 RX ORDER — LISINOPRIL 40 MG/1
TABLET ORAL
Qty: 90 TABLET | Refills: 3 | Status: SHIPPED | OUTPATIENT
Start: 2020-11-29 | End: 2021-12-06

## 2020-11-29 RX ORDER — ACETAMINOPHEN 325 MG/1
650 TABLET ORAL ONCE
Status: COMPLETED | OUTPATIENT
Start: 2020-11-29 | End: 2020-11-29

## 2020-11-29 RX ADMIN — LIDOCAINE HYDROCHLORIDE 10 ML: 10 INJECTION, SOLUTION EPIDURAL; INFILTRATION; INTRACAUDAL; PERINEURAL at 21:09

## 2020-11-29 RX ADMIN — PROPOFOL 150 MG: 10 INJECTION, EMULSION INTRAVENOUS at 23:14

## 2020-11-29 RX ADMIN — ACETAMINOPHEN 650 MG: 325 TABLET, FILM COATED ORAL at 21:09

## 2020-11-30 VITALS
SYSTOLIC BLOOD PRESSURE: 144 MMHG | OXYGEN SATURATION: 97 % | DIASTOLIC BLOOD PRESSURE: 67 MMHG | TEMPERATURE: 98 F | HEART RATE: 89 BPM | RESPIRATION RATE: 16 BRPM

## 2020-12-01 ENCOUNTER — OFFICE VISIT (OUTPATIENT)
Dept: OBGYN CLINIC | Facility: CLINIC | Age: 85
End: 2020-12-01
Payer: MEDICARE

## 2020-12-01 VITALS
WEIGHT: 170 LBS | HEIGHT: 64 IN | BODY MASS INDEX: 29.02 KG/M2 | SYSTOLIC BLOOD PRESSURE: 144 MMHG | DIASTOLIC BLOOD PRESSURE: 77 MMHG | HEART RATE: 99 BPM

## 2020-12-01 DIAGNOSIS — S43.084A CLOSED DISLOCATION OF RIGHT GLENOHUMERAL JOINT, INITIAL ENCOUNTER: Primary | ICD-10-CM

## 2020-12-01 PROCEDURE — 99203 OFFICE O/P NEW LOW 30 MIN: CPT | Performed by: ORTHOPAEDIC SURGERY

## 2020-12-04 ENCOUNTER — TELEPHONE (OUTPATIENT)
Dept: OBGYN CLINIC | Facility: HOSPITAL | Age: 85
End: 2020-12-04

## 2020-12-04 NOTE — TELEPHONE ENCOUNTER
Patient is calling wondering how long she has to wear the sling for  She says that it is uncomfortable and is wondering if a traditional sling would be better  I see that in the note it says to use for comfort

## 2020-12-15 ENCOUNTER — OFFICE VISIT (OUTPATIENT)
Dept: OBGYN CLINIC | Facility: CLINIC | Age: 85
End: 2020-12-15
Payer: MEDICARE

## 2020-12-15 VITALS
DIASTOLIC BLOOD PRESSURE: 77 MMHG | HEIGHT: 64 IN | HEART RATE: 92 BPM | WEIGHT: 170 LBS | SYSTOLIC BLOOD PRESSURE: 156 MMHG | BODY MASS INDEX: 29.02 KG/M2

## 2020-12-15 DIAGNOSIS — S43.084A CLOSED DISLOCATION OF RIGHT GLENOHUMERAL JOINT, INITIAL ENCOUNTER: Primary | ICD-10-CM

## 2020-12-15 PROCEDURE — 99213 OFFICE O/P EST LOW 20 MIN: CPT | Performed by: ORTHOPAEDIC SURGERY

## 2020-12-18 ENCOUNTER — TELEMEDICINE (OUTPATIENT)
Dept: INTERNAL MEDICINE CLINIC | Facility: CLINIC | Age: 85
End: 2020-12-18
Payer: MEDICARE

## 2020-12-18 DIAGNOSIS — S49.90XA RECENT SHOULDER INJURY: ICD-10-CM

## 2020-12-18 DIAGNOSIS — R68.83 CHILLS WITHOUT FEVER: Primary | ICD-10-CM

## 2020-12-18 PROCEDURE — 99441 PR PHYS/QHP TELEPHONE EVALUATION 5-10 MIN: CPT | Performed by: NURSE PRACTITIONER

## 2020-12-21 ENCOUNTER — TELEPHONE (OUTPATIENT)
Dept: OBGYN CLINIC | Facility: HOSPITAL | Age: 85
End: 2020-12-21

## 2020-12-21 DIAGNOSIS — F41.9 ANXIETY: ICD-10-CM

## 2020-12-21 RX ORDER — ALPRAZOLAM 0.25 MG/1
0.25 TABLET ORAL 2 TIMES DAILY
Qty: 60 TABLET | Refills: 0 | Status: SHIPPED | OUTPATIENT
Start: 2020-12-21 | End: 2021-01-22

## 2020-12-23 ENCOUNTER — OFFICE VISIT (OUTPATIENT)
Dept: INTERNAL MEDICINE CLINIC | Facility: CLINIC | Age: 85
End: 2020-12-23
Payer: MEDICARE

## 2020-12-23 VITALS
TEMPERATURE: 97.6 F | SYSTOLIC BLOOD PRESSURE: 120 MMHG | DIASTOLIC BLOOD PRESSURE: 74 MMHG | HEART RATE: 97 BPM | OXYGEN SATURATION: 98 %

## 2020-12-23 DIAGNOSIS — F41.9 ANXIETY: ICD-10-CM

## 2020-12-23 DIAGNOSIS — I10 ESSENTIAL HYPERTENSION: ICD-10-CM

## 2020-12-23 DIAGNOSIS — F32.A DEPRESSION, UNSPECIFIED DEPRESSION TYPE: Primary | ICD-10-CM

## 2020-12-23 DIAGNOSIS — S43.084A CLOSED DISLOCATION OF RIGHT GLENOHUMERAL JOINT, INITIAL ENCOUNTER: ICD-10-CM

## 2020-12-23 DIAGNOSIS — M19.90 ARTHRITIS: ICD-10-CM

## 2020-12-23 PROBLEM — R68.83 CHILLS WITHOUT FEVER: Status: RESOLVED | Noted: 2020-12-18 | Resolved: 2020-12-23

## 2020-12-23 PROBLEM — S49.90XA: Status: RESOLVED | Noted: 2020-12-18 | Resolved: 2020-12-23

## 2020-12-23 PROCEDURE — 99214 OFFICE O/P EST MOD 30 MIN: CPT | Performed by: INTERNAL MEDICINE

## 2020-12-23 RX ORDER — AMLODIPINE BESYLATE 2.5 MG/1
2.5 TABLET ORAL DAILY
Qty: 90 TABLET | Refills: 2 | Status: SHIPPED | OUTPATIENT
Start: 2020-12-23 | End: 2021-08-13

## 2020-12-23 RX ORDER — ESCITALOPRAM OXALATE 5 MG/1
5 TABLET ORAL DAILY
Qty: 30 TABLET | Refills: 2 | Status: SHIPPED | OUTPATIENT
Start: 2020-12-23 | End: 2021-01-28 | Stop reason: ALTCHOICE

## 2021-01-15 ENCOUNTER — TELEPHONE (OUTPATIENT)
Dept: INTERNAL MEDICINE CLINIC | Facility: CLINIC | Age: 86
End: 2021-01-15

## 2021-01-15 NOTE — TELEPHONE ENCOUNTER
Call returned to the patient we discussed her progress with recovery from shoulder dislocation period

## 2021-01-15 NOTE — TELEPHONE ENCOUNTER
Pt left a voicemail requesting a call back from [address] [place] [address] in regards to her shoulder pain and questions about continuation of care with an orthopaedic doctor  Pt can be reached back at [phone]      Thank you

## 2021-01-22 DIAGNOSIS — F41.9 ANXIETY: ICD-10-CM

## 2021-01-22 RX ORDER — ALPRAZOLAM 0.25 MG/1
0.25 TABLET ORAL 2 TIMES DAILY PRN
Qty: 60 TABLET | Refills: 0 | Status: SHIPPED | OUTPATIENT
Start: 2021-01-22 | End: 2021-02-19 | Stop reason: SDUPTHER

## 2021-01-28 ENCOUNTER — OFFICE VISIT (OUTPATIENT)
Dept: INTERNAL MEDICINE CLINIC | Facility: CLINIC | Age: 86
End: 2021-01-28
Payer: MEDICARE

## 2021-01-28 VITALS
OXYGEN SATURATION: 98 % | HEIGHT: 64 IN | SYSTOLIC BLOOD PRESSURE: 126 MMHG | HEART RATE: 88 BPM | TEMPERATURE: 97.9 F | BODY MASS INDEX: 28.71 KG/M2 | WEIGHT: 168.2 LBS | DIASTOLIC BLOOD PRESSURE: 66 MMHG

## 2021-01-28 DIAGNOSIS — S43.084D CLOSED DISLOCATION OF RIGHT GLENOHUMERAL JOINT, SUBSEQUENT ENCOUNTER: ICD-10-CM

## 2021-01-28 DIAGNOSIS — M20.41 HAMMER TOE OF RIGHT FOOT: ICD-10-CM

## 2021-01-28 DIAGNOSIS — M20.41 HAMMERTOE OF RIGHT FOOT: ICD-10-CM

## 2021-01-28 DIAGNOSIS — M79.7 FIBROMYALGIA: ICD-10-CM

## 2021-01-28 DIAGNOSIS — I10 ESSENTIAL HYPERTENSION: ICD-10-CM

## 2021-01-28 DIAGNOSIS — M21.619 BUNION OF GREAT TOE: ICD-10-CM

## 2021-01-28 DIAGNOSIS — F41.9 ANXIETY: ICD-10-CM

## 2021-01-28 DIAGNOSIS — F32.A DEPRESSION, UNSPECIFIED DEPRESSION TYPE: Primary | ICD-10-CM

## 2021-01-28 PROCEDURE — 99215 OFFICE O/P EST HI 40 MIN: CPT | Performed by: INTERNAL MEDICINE

## 2021-01-28 RX ORDER — LATANOPROST 50 UG/ML
SOLUTION/ DROPS OPHTHALMIC
COMMUNITY
Start: 2021-01-21

## 2021-01-28 NOTE — ASSESSMENT & PLAN NOTE
Ongoing anxiety condition for which the patient has been using alprazolam 0 25 mg of twice a day as needed for anxiety relief  Of this medication does seem to beneficial at treating and controlling her anxiety  Unfortunately trial of Lexapro was not successful    Continue alprazolam 0 25 twice a day as needed for anxiety really no signs or symptoms of dependence or abuse

## 2021-01-28 NOTE — ASSESSMENT & PLAN NOTE
Patient continues to  display symptoms and signs of depression  Unfortunately her trial on Lexapro did not go well and she experience side effects resulting in termination of the medication  She has had a very difficult time dealing with the loss of her  over the past year  Of the COVID-19 isolation has compounded this situation for the patient  Of we did discuss trying a 2nd antidepressant medication possibly amitriptyline which could address depression symptoms along with her fibromyalgia  She is not interested in a new medication trial at the present time

## 2021-01-28 NOTE — PROGRESS NOTES
Assessment/Plan:    Hypertension   A review and assessment of the patient's blood pressure today indicates a reading of 126/66 representing good control of her blood pressure I recommend continuation of her current therapy which includes amlodipine 2 5 mg daily along with lisinopril 40 mg daily  She reports no signs or symptoms of uncontrolled hypertension or hypotension nor any side effects of these medications recommend continued follow-up with re check in 1 month    Hammertoe of right foot   Hammertoe of the right foot recommend podiatry evaluation referral has been placed for the patient today    Closed dislocation of right glenohumeral joint   Closed dislocation of the right shoulder joint reviewed with the patient today she has still a lot of discomfort in movement of the shoulder  She has discomfort in the tricep muscle as well  I have limited access to her physical therapy notes but it appears that she is making some progress with physical therapy  Still not driving due to limited mobility of her right shoulder  Follow-up with orthopedic services recommended if physical therapy progress does not resulted in complete resolution of the manifestations of her injury  Bunion of great toe    Podiatrist E consultation and referral placed for the patient for further evaluation and treatment of the patient's bunion period    Fibromyalgia   Reviewed the patient's fibromyalgia symptoms  Indicated that since her trial of Lexapro was not successful if she wished to try a different antidepressant I would most likely going the direction of amitriptyline due to its potential benefit for her fibromyalgia as well as depression symptoms  At the present time patient declines a trial of amitriptyline  Most recent evaluation by rheumatology was reviewed today recommend continued follow-up with Rheumatology for management of fibromyalgia    I have recommend the patient trial Voltaren gel 2 g 4 times a day applied to the area of the knees and slightly below the knees  Will follow up with the patient in 1 month    Depression    Patient continues to  display symptoms and signs of depression  Unfortunately her trial on Lexapro did not go well and she experience side effects resulting in termination of the medication  She has had a very difficult time dealing with the loss of her  over the past year  Of the COVID-19 isolation has compounded this situation for the patient  Of we did discuss trying a 2nd antidepressant medication possibly amitriptyline which could address depression symptoms along with her fibromyalgia  She is not interested in a new medication trial at the present time  Anxiety   Ongoing anxiety condition for which the patient has been using alprazolam 0 25 mg of twice a day as needed for anxiety relief  Of this medication does seem to beneficial at treating and controlling her anxiety  Unfortunately trial of Lexapro was not successful  Continue alprazolam 0 25 twice a day as needed for anxiety really no signs or symptoms of dependence or abuse       Diagnoses and all orders for this visit:    Hammer toe of right foot  -     Ambulatory referral to Podiatry; Future    Essential hypertension    Hammertoe of right foot    Closed dislocation of right glenohumeral joint, subsequent encounter    Anxiety    Depression, unspecified depression type    Fibromyalgia    Other orders  -     latanoprost (XALATAN) 0 005 % ophthalmic solution; ADMINISTER 1 DROP IN [facility] EYE AT BEDTIME        Subjective:      Patient ID: Saqib Nguyen is a 82 y o  female  This 72-year-old female patient returns today for a follow-up visit    During her last visit with us we discussed her symptoms of depression and agree to start Lexapro medication to address her depression symptoms period of patient indicates that after taking the medication for several days she began to feel flu like symptoms of from the medication and decided to stop the medication  This included feeling aches and pains as well as feeling like she was a dissociated  These symptoms resolved with the discontinuation of the Lexapro  Patient described today her progress with physical therapy far her previous dislocation of the right shoulder  She continues with physical therapy twice a day  Unfortunately because these therapy sessions are through [address] [place] [address] we do not have the actual physical therapy notes to review  Patient indicates she is gradually making progress but still has some discomfort in the triceps muscle and decreased strength and rotational range of motion in the shoulder as well  She is encouraged to continue with physical therapy  Patient also expressed that she has been  Experiencing more pain in the legs below her knees of over the past several weeks  This pain seems to be consistent with her previous diagnosis of fibro myalgia  Patient is also indicated a desire to see a podiatrist regarding her hammertoe condition as well as calluses and foot care in general     She has had no chest pain palpitations shortness of breath nor does she have any signs or symptoms of a COVID-19 infection  The following portions of the patient's history were reviewed and updated as appropriate:   She  has a past medical history of Epistaxis and Saphenous vein thrombophlebitis, right (2012)    She   Patient Active Problem List    Diagnosis Date Noted    Arthritis 12/23/2020    Closed dislocation of right glenohumeral joint 12/01/2020    Chronic left shoulder pain 11/05/2020    Other fatigue 05/08/2020    Nail fungus 01/17/2020    Varicose veins of both lower extremities 09/30/2019    Allergy 06/12/2019    Depression 02/28/2019    Bunion of great toe 02/28/2019    Hammertoe of right foot 02/28/2019    Hyperlipidemia 10/31/2018    Glucose intolerance 10/31/2018    Flatulence 10/31/2018    Anxiety 10/09/2014    Fibromyalgia 10/09/2014    Hypertension 10/09/2014     She  has a past surgical history that includes Gallbladder surgery (2006)  Her family history includes Hypertension in her mother  She  reports that she has never smoked  She has never used smokeless tobacco  She reports previous alcohol use  She reports that she does not use drugs    Current Outpatient Medications   Medication Sig Dispense Refill    ALPRAZolam (XANAX) 0 25 mg tablet Take 1 tablet (0 25 mg total) by mouth 2 (two) times a day as needed for anxiety 60 tablet 0    amLODIPine (NORVASC) 2 5 mg tablet Take 1 tablet (2 5 mg total) by mouth daily 90 tablet 2    aspirin 81 MG tablet Take 1 tablet by mouth daily 3 times a week      cholecalciferol (VITAMIN D3) 1,000 units tablet Take by mouth      ciclopirox (PENLAC) 8 % solution Apply topically daily at bedtime 6 6 mL 2    cycloSPORINE (RESTASIS) 0 05 % ophthalmic emulsion Apply to eye      diclofenac sodium (VOLTAREN) 1 % 2 g 4 (four) times a day   2    ergocalciferol (VITAMIN D2) 50,000 units TAKE 1 CAPSULE BY MOUTH ONE TIME PER WEEK      famotidine (PEPCID) 20 mg tablet Take 1 tablet by mouth daily      fluticasone (FLONASE) 50 mcg/act nasal spray SPRAY 1 SPRAY INTO EACH NOSTRIL TWICE A DAY 48 mL 3    latanoprost (XALATAN) 0 005 % ophthalmic solution ADMINISTER 1 DROP IN EACH EYE AT BEDTIME      lisinopril (ZESTRIL) 40 mg tablet TAKE 1 TABLET BY MOUTH EVERY DAY 90 tablet 3    loratadine (CLARITIN) 10 mg tablet Take 1 tablet by mouth daily      PREMARIN vaginal cream APPLY 1/4 INCH WITH FINGER TO VAGINA MONDAY AND FRIDAY AT BEDTIME  2    timolol (Betimol) 0 5 % ophthalmic solution Timolol Maleate 0 5 % Ophthalmic Solution  INSTILL DROP Once    Refills: 0       Active      timolol (TIMOPTIC) 0 5 % ophthalmic solution INSTILL 1 DROP INTO RIGHT EYE EVERY DAY  2    ZIOPTAN 0 0015 % ophthalmic solution ADMINISTER 1 DROP OPHTHALMICALLY AT BEDTIME IN THE RIGHT EYE, BRAND NECESSARY  6     No current facility-administered medications for this visit       Review of Systems   Musculoskeletal: Positive for arthralgias and myalgias  Right shoulder pain   Psychiatric/Behavioral: Positive for dysphoric mood  The patient is nervous/anxious  All other systems reviewed and are negative  Objective:      /66   Pulse 88   Temp 97 9 °F (36 6 °C) (Tympanic)   Ht 5' 4" (1 626 m)   Wt 76 3 kg (168 lb 3 2 oz)   SpO2 98%   BMI 28 87 kg/m²          Physical Exam  Vitals signs reviewed  Constitutional:       General: She is not in acute distress  Appearance: Normal appearance  She is well-developed  She is not ill-appearing  HENT:      Right Ear: Hearing, tympanic membrane, ear canal and external ear normal       Left Ear: Hearing, tympanic membrane, ear canal and external ear normal       Nose: Nose normal  No mucosal edema  Mouth/Throat:      Pharynx: Uvula midline  Eyes:      General: Lids are normal       Conjunctiva/sclera: Conjunctivae normal       Pupils: Pupils are equal, round, and reactive to light  Neck:      Thyroid: No thyromegaly  Vascular: No carotid bruit or JVD  Cardiovascular:      Rate and Rhythm: Normal rate and regular rhythm  Heart sounds: Normal heart sounds  No murmur  Pulmonary:      Effort: Pulmonary effort is normal  No respiratory distress  Breath sounds: Normal breath sounds  No wheezing, rhonchi or rales  Abdominal:      General: Bowel sounds are normal       Palpations: Abdomen is soft  Musculoskeletal: Normal range of motion  General: Tenderness present  Lymphadenopathy:      Cervical: No cervical adenopathy  Skin:     General: Skin is warm and dry  Neurological:      Mental Status: She is alert and oriented to person, place, and time  Deep Tendon Reflexes: Reflexes are normal and symmetric   Reflexes normal    Psychiatric:         Speech: Speech normal          Behavior: Behavior normal  Behavior is cooperative  Thought Content:  Thought content normal          Judgment: Judgment normal       Comments:  Mildly depressed affect

## 2021-01-28 NOTE — ASSESSMENT & PLAN NOTE
Podiatrist E consultation and referral placed for the patient for further evaluation and treatment of the patient's bunion period

## 2021-01-28 NOTE — ASSESSMENT & PLAN NOTE
Reviewed the patient's fibromyalgia symptoms  Indicated that since her trial of Lexapro was not successful if she wished to try a different antidepressant I would most likely going the direction of amitriptyline due to its potential benefit for her fibromyalgia as well as depression symptoms  At the present time patient declines a trial of amitriptyline  Most recent evaluation by rheumatology was reviewed today recommend continued follow-up with Rheumatology for management of fibromyalgia  I have recommend the patient trial Voltaren gel 2 g 4 times a day applied to the area of the knees and slightly below the knees    Will follow up with the patient in 1 month

## 2021-01-28 NOTE — ASSESSMENT & PLAN NOTE
A review and assessment of the patient's blood pressure today indicates a reading of 126/66 representing good control of her blood pressure I recommend continuation of her current therapy which includes amlodipine 2 5 mg daily along with lisinopril 40 mg daily    She reports no signs or symptoms of uncontrolled hypertension or hypotension nor any side effects of these medications recommend continued follow-up with re check in 1 month

## 2021-01-28 NOTE — ASSESSMENT & PLAN NOTE
Closed dislocation of the right shoulder joint reviewed with the patient today she has still a lot of discomfort in movement of the shoulder  She has discomfort in the tricep muscle as well  I have limited access to her physical therapy notes but it appears that she is making some progress with physical therapy  Still not driving due to limited mobility of her right shoulder  Follow-up with orthopedic services recommended if physical therapy progress does not resulted in complete resolution of the manifestations of her injury

## 2021-02-03 ENCOUNTER — IMMUNIZATIONS (OUTPATIENT)
Dept: FAMILY MEDICINE CLINIC | Facility: HOSPITAL | Age: 86
End: 2021-02-03

## 2021-02-03 DIAGNOSIS — Z23 ENCOUNTER FOR IMMUNIZATION: Primary | ICD-10-CM

## 2021-02-03 PROCEDURE — 0001A SARS-COV-2 / COVID-19 MRNA VACCINE (PFIZER-BIONTECH) 30 MCG: CPT

## 2021-02-03 PROCEDURE — 91300 SARS-COV-2 / COVID-19 MRNA VACCINE (PFIZER-BIONTECH) 30 MCG: CPT

## 2021-02-11 ENCOUNTER — TELEPHONE (OUTPATIENT)
Dept: INTERNAL MEDICINE CLINIC | Facility: CLINIC | Age: 86
End: 2021-02-11

## 2021-02-11 DIAGNOSIS — F32.A DEPRESSION, UNSPECIFIED DEPRESSION TYPE: Primary | ICD-10-CM

## 2021-02-11 RX ORDER — AMITRIPTYLINE HYDROCHLORIDE 10 MG/1
10 TABLET, FILM COATED ORAL
Qty: 30 TABLET | Refills: 1 | Status: SHIPPED | OUTPATIENT
Start: 2021-02-11 | End: 2021-03-01 | Stop reason: SDDI

## 2021-02-11 NOTE — PROGRESS NOTES
Patient called the of she is continuing to experience some anxiety and depression symptoms and is now willing to try a 2nd medication for her depression  We discussed amitriptyline due to its ability to treat depression and also address her fibromyalgia symptoms  She will started  5 mg at bedtime for the 1st 6 nights and then go to 10 mg daily she does have a follow-up visit with me in early March in will discuss progress with medication at that time

## 2021-02-12 ENCOUNTER — TELEPHONE (OUTPATIENT)
Dept: INTERNAL MEDICINE CLINIC | Facility: CLINIC | Age: 86
End: 2021-02-12

## 2021-02-12 NOTE — TELEPHONE ENCOUNTER
[facility] on [address] is calling because the amitriptyline 10 mg tab sent yesterday is on the [facility] List  [facility] to proceed or a substitution  The pharmacy can be reached back at [phone]      Thank you

## 2021-02-16 ENCOUNTER — TELEPHONE (OUTPATIENT)
Dept: INTERNAL MEDICINE CLINIC | Facility: CLINIC | Age: 86
End: 2021-02-16

## 2021-02-16 NOTE — TELEPHONE ENCOUNTER
"Subjective:   Olive Easley is a 52 y.o. male here today for right, ring finger pain.       Trigger ring finger of right hand  Approximately one month ago he started noticing pain to his right, ring finger. It also has been \"locking up\" and getting stuck in the flexed position, worse in the morning. This has never happened before. He is right hand dominant.          Current medicines (including changes today)  Current Outpatient Medications   Medication Sig Dispense Refill   • sildenafil citrate (VIAGRA) 100 MG tablet Take 1 Tab by mouth 1 time daily as needed for Erectile Dysfunction. 9 Tab 5   • hydrocortisone 2.5 % Cream topical cream Apply 1 Application to affected area(s) 2 times a day as needed (itching apply to affected area). 30 g 3   • ergocalciferol (DRISDOL) 53543 UNIT capsule Take 1 Cap by mouth every 7 days. 12 Cap 6     No current facility-administered medications for this visit.      He  has no past medical history on file.    ROS   No fever       Objective:     /84 (BP Location: Right arm, Patient Position: Sitting)   Pulse (!) 110   Temp (!) 35.8 °C (96.4 °F)   Ht 1.6 m (5' 3\")   Wt 79.8 kg (176 lb)   SpO2 94%  Body mass index is 31.18 kg/m².   Physical Exam:  Constitutional: Alert, no distress.  Skin: Warm, dry, good turgor, no rashes in visible areas.  Psych: Alert and oriented x3, normal affect and mood.  Right hand: 4th finger snapping with flexion.    Assessment and Plan:   The following treatment plan was discussed    1. Trigger ring finger of right hand  New problem. Referral to hand specialist for significant triggering. Discussed massaging and hand exercises.  - REFERRAL TO HAND SURGERY    2. Need for vaccination  - Influenza Vaccine Quad Injection (PF)          Followup: Return if symptoms worsen or fail to improve.         " [place] called from [facility]  to see if you could fax a scipt over for patient to have physical therapy for right shoulder  It should be faxed to [phone]

## 2021-02-19 DIAGNOSIS — F41.9 ANXIETY: ICD-10-CM

## 2021-02-19 RX ORDER — ALPRAZOLAM 0.25 MG/1
0.25 TABLET ORAL 2 TIMES DAILY PRN
Qty: 60 TABLET | Refills: 0 | Status: SHIPPED | OUTPATIENT
Start: 2021-02-19 | End: 2021-03-22 | Stop reason: SDUPTHER

## 2021-02-26 ENCOUNTER — IMMUNIZATIONS (OUTPATIENT)
Dept: FAMILY MEDICINE CLINIC | Facility: HOSPITAL | Age: 86
End: 2021-02-26

## 2021-02-26 DIAGNOSIS — Z23 ENCOUNTER FOR IMMUNIZATION: Primary | ICD-10-CM

## 2021-02-26 PROCEDURE — 0002A SARS-COV-2 / COVID-19 MRNA VACCINE (PFIZER-BIONTECH) 30 MCG: CPT

## 2021-02-26 PROCEDURE — 91300 SARS-COV-2 / COVID-19 MRNA VACCINE (PFIZER-BIONTECH) 30 MCG: CPT

## 2021-03-01 ENCOUNTER — OFFICE VISIT (OUTPATIENT)
Dept: INTERNAL MEDICINE CLINIC | Facility: CLINIC | Age: 86
End: 2021-03-01
Payer: MEDICARE

## 2021-03-01 VITALS
HEART RATE: 85 BPM | DIASTOLIC BLOOD PRESSURE: 72 MMHG | SYSTOLIC BLOOD PRESSURE: 136 MMHG | OXYGEN SATURATION: 98 % | HEIGHT: 64 IN | BODY MASS INDEX: 28.75 KG/M2 | WEIGHT: 168.4 LBS | TEMPERATURE: 95.8 F

## 2021-03-01 DIAGNOSIS — M19.90 ARTHRITIS: ICD-10-CM

## 2021-03-01 DIAGNOSIS — I10 ESSENTIAL HYPERTENSION: ICD-10-CM

## 2021-03-01 DIAGNOSIS — F32.A DEPRESSION, UNSPECIFIED DEPRESSION TYPE: Primary | ICD-10-CM

## 2021-03-01 DIAGNOSIS — F41.9 ANXIETY: ICD-10-CM

## 2021-03-01 PROCEDURE — 99214 OFFICE O/P EST MOD 30 MIN: CPT | Performed by: INTERNAL MEDICINE

## 2021-03-01 RX ORDER — MELOXICAM 7.5 MG/1
7.5 TABLET ORAL DAILY
Qty: 14 TABLET | Refills: 1 | Status: SHIPPED | OUTPATIENT
Start: 2021-03-01 | End: 2021-08-27 | Stop reason: ALTCHOICE

## 2021-03-01 NOTE — ASSESSMENT & PLAN NOTE
I have reviewed the report from the x-ray study of the patient's left knee performed by her rheumatologist it does confirm the presence of advanced arthritis with bone-on-bone in the lateral compartment and advanced arthritis in the medial compartment explained these findings to the patient today in indicated that most likely she will need some form of anti-inflammatory medication for relief  Previous injection of steroid was temporarily beneficial but symptoms did return  We discussed options and the patient is comfortable with a trial of meloxicam 7 5 mg daily period of Voltaren gel was tried but not effective at relieving her symptoms I have urged her to take the meloxicam on a daily basis but also take it always with food    A follow-up visit in 2 weeks to assess the benefits of this medication have been arranged

## 2021-03-01 NOTE — ASSESSMENT & PLAN NOTE
Assessment of the patient's hypertension on today's visit indicates that a slightly upward trending blood pressure reading of 136/72 but the patient is experiencing discomfort in her knee today  At the present time recommend continuation of lisinopril at 40 mg daily for management of her hypertension along with 2 5 mg of amlodipine daily a follow-up assessment in 2 weeks is been requested

## 2021-03-01 NOTE — PROGRESS NOTES
Assessment/Plan:    Hypertension   Assessment of the patient's hypertension on today's visit indicates that a slightly upward trending blood pressure reading of 136/72 but the patient is experiencing discomfort in her knee today  At the present time recommend continuation of lisinopril at 40 mg daily for management of her hypertension along with 2 5 mg of amlodipine daily a follow-up assessment in 2 weeks is been requested  Arthritis    I have reviewed the report from the x-ray study of the patient's left knee performed by her rheumatologist it does confirm the presence of advanced arthritis with bone-on-bone in the lateral compartment and advanced arthritis in the medial compartment explained these findings to the patient today in indicated that most likely she will need some form of anti-inflammatory medication for relief  Previous injection of steroid was temporarily beneficial but symptoms did return  We discussed options and the patient is comfortable with a trial of meloxicam 7 5 mg daily period of Voltaren gel was tried but not effective at relieving her symptoms I have urged her to take the meloxicam on a daily basis but also take it always with food  A follow-up visit in 2 weeks to assess the benefits of this medication have been arranged    Depression   Patient continues to  Displaced symptoms of mild depression  She decided against a trial of amitriptyline she purchase the medication but never initiated it  At the present time a believe she would benefit from a mild antidepressant medication but she is reluctant to start any this medication  We will re-evaluate in 2 weeks    Anxiety   Chronic anxiety condition continues  The patient does have alprazolam 0 25 minute mg and takes this medication twice a day for control of her anxiety symptoms  Will watch this use of medication closely as I am concerned about long-term potential for dependence      Closed dislocation of right glenohumeral joint Patient continues to experience discomfort in her right shoulder I reviewed with her the updated performance notes from her physical therapist she continues to have physical therapy twice a week for her right shoulder  Meloxicam recommended for her knee may be beneficial for her shoulder as well       Diagnoses and all orders for this visit:    Depression, unspecified depression type    Arthritis  -     meloxicam (MOBIC) 7 5 mg tablet; Take 1 tablet (7 5 mg total) by mouth daily    Anxiety    Essential hypertension        Subjective:      Patient ID: Heydi Nair is a 82 y o  female  This 61-year-old female patient returns today for a follow-up assessment  On her last visit we discussed symptoms of depression and had recommended the initiation of amitriptyline 10 mg at bedtime  Patient picked up the prescription but decided not to fill the prescription  Indicated that this is fine if she feels less of depression symptoms than she did previously there is no need to initiate the amitriptyline  Did indicate that I thought it would be a good choice to address her depression symptoms along with her ongoing fibromyalgia  Patient today continues to experience symptoms of pain in her left knee and her rheumatologist has previously x-rayed this knee through [address]  Review of the x-ray report indicates advanced arthritis with bone-on-bone in the lateral compartment and advanced arthritis in the medial compartment  Reviewed these results with the patient indicating that most likely for relief she will need an anti inflammatory medication  She has been using ibuprofen over-the-counter with some 6s finds that Voltaren gel does not seem to be very beneficial   Her rheumatologist had recommended  meloxicam 15 mg daily patient did not pursue this treatment in the past   She is willing to accept treatment with meloxicam at 7 5 mg daily    Indicated that most likely because of the advanced arthritis in her knee joint she will need some form of anti-inflammatory medication on a daily basis for comfort  Also recommended she consider the injection of glucosamine or highly again into the joint  This could be done through her rheumatologist or an orthopedic surgeon  Period of 30 minutes was spent with the patient today greater than 50% of this time was spent in counseling and coordination of care      The following portions of the patient's history were reviewed and updated as appropriate:   She  has a past medical history of Epistaxis and Saphenous vein thrombophlebitis, right (2012)  She   Patient Active Problem List    Diagnosis Date Noted    Arthritis 12/23/2020    Closed dislocation of right glenohumeral joint 12/01/2020    Chronic left shoulder pain 11/05/2020    Other fatigue 05/08/2020    Nail fungus 01/17/2020    Varicose veins of both lower extremities 09/30/2019    Allergy 06/12/2019    Depression 02/28/2019    Bunion of great toe 02/28/2019    Hammertoe of right foot 02/28/2019    Hyperlipidemia 10/31/2018    Glucose intolerance 10/31/2018    Flatulence 10/31/2018    Anxiety 10/09/2014    Fibromyalgia 10/09/2014    Hypertension 10/09/2014     She  has a past surgical history that includes Gallbladder surgery (2006)  Her family history includes Hypertension in her mother  She  reports that she has never smoked  She has never used smokeless tobacco  She reports previous alcohol use  She reports that she does not use drugs    Current Outpatient Medications   Medication Sig Dispense Refill    ALPRAZolam (XANAX) 0 25 mg tablet Take 1 tablet (0 25 mg total) by mouth 2 (two) times a day as needed for anxiety 60 tablet 0    amLODIPine (NORVASC) 2 5 mg tablet Take 1 tablet (2 5 mg total) by mouth daily 90 tablet 2    aspirin 81 MG tablet Take 1 tablet by mouth daily 3 times a week      cholecalciferol (VITAMIN D3) 1,000 units tablet Take by mouth      ciclopirox (PENLAC) 8 % solution Apply topically daily at bedtime 6 6 mL 2    cycloSPORINE (RESTASIS) 0 05 % ophthalmic emulsion Apply to eye      ergocalciferol (VITAMIN D2) 50,000 units TAKE 1 CAPSULE BY MOUTH ONE TIME PER WEEK      famotidine (PEPCID) 20 mg tablet Take 1 tablet by mouth daily      fluticasone (FLONASE) 50 mcg/act nasal spray SPRAY 1 SPRAY INTO EACH NOSTRIL TWICE A DAY 48 mL 3    latanoprost (XALATAN) 0 005 % ophthalmic solution ADMINISTER 1 DROP IN EACH EYE AT BEDTIME      lisinopril (ZESTRIL) 40 mg tablet TAKE 1 TABLET BY MOUTH EVERY DAY 90 tablet 3    loratadine (CLARITIN) 10 mg tablet Take 1 tablet by mouth daily      PREMARIN vaginal cream APPLY 1/4 INCH WITH FINGER TO VAGINA MONDAY AND FRIDAY AT BEDTIME  2    timolol (Betimol) 0 5 % ophthalmic solution Timolol Maleate 0 5 % Ophthalmic Solution  INSTILL DROP Once    Refills: 0       Active      timolol (TIMOPTIC) 0 5 % ophthalmic solution INSTILL 1 DROP INTO RIGHT EYE EVERY DAY  2    ZIOPTAN 0 0015 % ophthalmic solution ADMINISTER 1 DROP OPHTHALMICALLY AT BEDTIME IN THE RIGHT EYE, BRAND NECESSARY  6    meloxicam (MOBIC) 7 5 mg tablet Take 1 tablet (7 5 mg total) by mouth daily 14 tablet 1     No current facility-administered medications for this visit       Review of Systems   Musculoskeletal: Positive for arthralgias, joint swelling and myalgias  Psychiatric/Behavioral: Positive for dysphoric mood  The patient is nervous/anxious  All other systems reviewed and are negative  Objective:      /72   Pulse 85   Temp (!) 95 8 °F (35 4 °C) (Tympanic)   Ht 5' 4" (1 626 m)   Wt 76 4 kg (168 lb 6 4 oz)   SpO2 98%   BMI 28 91 kg/m²          Physical Exam  Vitals signs reviewed  Constitutional:       General: She is not in acute distress  Appearance: Normal appearance  She is well-developed     HENT:      Right Ear: Hearing and external ear normal       Left Ear: Hearing and external ear normal       Nose: Nose normal  No mucosal edema       Mouth/Throat:      Pharynx: Uvula midline  Eyes:      General: Lids are normal       Conjunctiva/sclera: Conjunctivae normal       Pupils: Pupils are equal, round, and reactive to light  Neck:      Thyroid: No thyromegaly  Vascular: No carotid bruit or JVD  Cardiovascular:      Rate and Rhythm: Normal rate and regular rhythm  Heart sounds: Normal heart sounds  No murmur  Pulmonary:      Effort: Pulmonary effort is normal  No respiratory distress  Breath sounds: Normal breath sounds  No wheezing, rhonchi or rales  Abdominal:      General: Bowel sounds are normal       Palpations: Abdomen is soft  Musculoskeletal: Normal range of motion  Right lower leg: No edema  Left lower leg: No edema  Lymphadenopathy:      Cervical: No cervical adenopathy  Skin:     General: Skin is warm and dry  Neurological:      Mental Status: She is alert and oriented to person, place, and time  Deep Tendon Reflexes: Reflexes are normal and symmetric  Reflexes normal    Psychiatric:         Speech: Speech normal          Behavior: Behavior normal  Behavior is cooperative  Thought Content: Thought content normal          Judgment: Judgment normal        BMI Counseling: Body mass index is 28 91 kg/m²  The BMI is above normal  Nutrition recommendations include reducing portion sizes, decreasing overall calorie intake, 3-5 servings of fruits/vegetables daily and moderation in carbohydrate intake

## 2021-03-01 NOTE — ASSESSMENT & PLAN NOTE
Patient continues to experience discomfort in her right shoulder I reviewed with her the updated performance notes from her physical therapist she continues to have physical therapy twice a week for her right shoulder    Meloxicam recommended for her knee may be beneficial for her shoulder as well

## 2021-03-01 NOTE — ASSESSMENT & PLAN NOTE
Patient continues to  Displaced symptoms of mild depression  She decided against a trial of amitriptyline she purchase the medication but never initiated it  At the present time a believe she would benefit from a mild antidepressant medication but she is reluctant to start any this medication    We will re-evaluate in 2 weeks

## 2021-03-01 NOTE — ASSESSMENT & PLAN NOTE
Chronic anxiety condition continues  The patient does have alprazolam 0 25 minute mg and takes this medication twice a day for control of her anxiety symptoms  Will watch this use of medication closely as I am concerned about long-term potential for dependence

## 2021-03-08 ENCOUNTER — TELEPHONE (OUTPATIENT)
Dept: INTERNAL MEDICINE CLINIC | Facility: CLINIC | Age: 86
End: 2021-03-08

## 2021-03-08 NOTE — TELEPHONE ENCOUNTER
She was taking tylenol due to not being able to take ibruprofen with the meloxicam   Can she go back to the ibruprofen now that shes stopping the meloxicam?

## 2021-03-08 NOTE — TELEPHONE ENCOUNTER
Yes she can go back on the ibuprofen but it to can cause some stomach irritation so she may want away today before she goes back on that medication thank you

## 2021-03-08 NOTE — TELEPHONE ENCOUNTER
last week you gave her meloxicam, its really making her nauseous  She wants to know if she can just stop it? And is there something else you can recommend?

## 2021-03-08 NOTE — TELEPHONE ENCOUNTER
Please call patient back have her discontinue the meloxicam would not start another medication at this time until her stomach irritation resolves

## 2021-03-16 ENCOUNTER — TELEPHONE (OUTPATIENT)
Dept: INTERNAL MEDICINE CLINIC | Facility: CLINIC | Age: 86
End: 2021-03-16

## 2021-03-16 NOTE — TELEPHONE ENCOUNTER
Pt would like to speak to you today regarding medication she was taking for her knee  She can be reached at [phone]

## 2021-03-17 NOTE — TELEPHONE ENCOUNTER
Call returned recommend rheumatology or orthopedic consultation for consideration of steroid injection into her knee

## 2021-03-22 DIAGNOSIS — F41.9 ANXIETY: ICD-10-CM

## 2021-03-22 RX ORDER — ALPRAZOLAM 0.25 MG/1
0.25 TABLET ORAL 2 TIMES DAILY PRN
Qty: 60 TABLET | Refills: 0 | Status: SHIPPED | OUTPATIENT
Start: 2021-03-22 | End: 2021-04-22 | Stop reason: SDUPTHER

## 2021-04-20 ENCOUNTER — TRANSCRIBE ORDERS (OUTPATIENT)
Dept: ADMINISTRATIVE | Age: 86
End: 2021-04-20

## 2021-04-20 ENCOUNTER — APPOINTMENT (OUTPATIENT)
Dept: LAB | Age: 86
End: 2021-04-20
Payer: MEDICARE

## 2021-04-20 DIAGNOSIS — M79.7 SCAPULOHUMERAL FIBROSITIS: Primary | ICD-10-CM

## 2021-04-20 DIAGNOSIS — M79.7 SCAPULOHUMERAL FIBROSITIS: ICD-10-CM

## 2021-04-20 LAB
25(OH)D3 SERPL-MCNC: 57.5 NG/ML (ref 30–100)
ALBUMIN SERPL BCP-MCNC: 3.8 G/DL (ref 3.5–5)
ALP SERPL-CCNC: 83 U/L (ref 46–116)
ALT SERPL W P-5'-P-CCNC: 22 U/L (ref 12–78)
ANION GAP SERPL CALCULATED.3IONS-SCNC: 5 MMOL/L (ref 4–13)
AST SERPL W P-5'-P-CCNC: 14 U/L (ref 5–45)
BASOPHILS # BLD AUTO: 0.02 THOUSANDS/ΜL (ref 0–0.1)
BASOPHILS NFR BLD AUTO: 0 % (ref 0–1)
BILIRUB SERPL-MCNC: 0.49 MG/DL (ref 0.2–1)
BUN SERPL-MCNC: 30 MG/DL (ref 5–25)
CALCIUM SERPL-MCNC: 9.8 MG/DL (ref 8.3–10.1)
CHLORIDE SERPL-SCNC: 106 MMOL/L (ref 100–108)
CK SERPL-CCNC: 34 U/L (ref 26–192)
CO2 SERPL-SCNC: 27 MMOL/L (ref 21–32)
CREAT SERPL-MCNC: 0.77 MG/DL (ref 0.6–1.3)
CRP SERPL QL: <3 MG/L
EOSINOPHIL # BLD AUTO: 0.17 THOUSAND/ΜL (ref 0–0.61)
EOSINOPHIL NFR BLD AUTO: 2 % (ref 0–6)
ERYTHROCYTE [DISTWIDTH] IN BLOOD BY AUTOMATED COUNT: 13.2 % (ref 11.6–15.1)
ERYTHROCYTE [SEDIMENTATION RATE] IN BLOOD: 35 MM/HOUR (ref 0–29)
GFR SERPL CREATININE-BSD FRML MDRD: 71 ML/MIN/1.73SQ M
GLUCOSE P FAST SERPL-MCNC: 85 MG/DL (ref 65–99)
HCT VFR BLD AUTO: 45.8 % (ref 34.8–46.1)
HGB BLD-MCNC: 14.6 G/DL (ref 11.5–15.4)
IMM GRANULOCYTES # BLD AUTO: 0.04 THOUSAND/UL (ref 0–0.2)
IMM GRANULOCYTES NFR BLD AUTO: 0 % (ref 0–2)
LYMPHOCYTES # BLD AUTO: 2.03 THOUSANDS/ΜL (ref 0.6–4.47)
LYMPHOCYTES NFR BLD AUTO: 21 % (ref 14–44)
MAGNESIUM SERPL-MCNC: 2.3 MG/DL (ref 1.6–2.6)
MCH RBC QN AUTO: 29.6 PG (ref 26.8–34.3)
MCHC RBC AUTO-ENTMCNC: 31.9 G/DL (ref 31.4–37.4)
MCV RBC AUTO: 93 FL (ref 82–98)
MONOCYTES # BLD AUTO: 1.07 THOUSAND/ΜL (ref 0.17–1.22)
MONOCYTES NFR BLD AUTO: 11 % (ref 4–12)
NEUTROPHILS # BLD AUTO: 6.52 THOUSANDS/ΜL (ref 1.85–7.62)
NEUTS SEG NFR BLD AUTO: 66 % (ref 43–75)
NRBC BLD AUTO-RTO: 0 /100 WBCS
PHOSPHATE SERPL-MCNC: 3.7 MG/DL (ref 2.3–4.1)
PLATELET # BLD AUTO: 293 THOUSANDS/UL (ref 149–390)
PMV BLD AUTO: 10.9 FL (ref 8.9–12.7)
POTASSIUM SERPL-SCNC: 4.6 MMOL/L (ref 3.5–5.3)
PROT SERPL-MCNC: 7.4 G/DL (ref 6.4–8.2)
RBC # BLD AUTO: 4.93 MILLION/UL (ref 3.81–5.12)
SODIUM SERPL-SCNC: 138 MMOL/L (ref 136–145)
TSH SERPL DL<=0.05 MIU/L-ACNC: 1.82 UIU/ML (ref 0.36–3.74)
WBC # BLD AUTO: 9.85 THOUSAND/UL (ref 4.31–10.16)

## 2021-04-20 PROCEDURE — 86430 RHEUMATOID FACTOR TEST QUAL: CPT

## 2021-04-20 PROCEDURE — 36415 COLL VENOUS BLD VENIPUNCTURE: CPT

## 2021-04-20 PROCEDURE — 82306 VITAMIN D 25 HYDROXY: CPT

## 2021-04-20 PROCEDURE — 86803 HEPATITIS C AB TEST: CPT

## 2021-04-20 PROCEDURE — 85025 COMPLETE CBC W/AUTO DIFF WBC: CPT

## 2021-04-20 PROCEDURE — 84443 ASSAY THYROID STIM HORMONE: CPT

## 2021-04-20 PROCEDURE — 86038 ANTINUCLEAR ANTIBODIES: CPT

## 2021-04-20 PROCEDURE — 83735 ASSAY OF MAGNESIUM: CPT

## 2021-04-20 PROCEDURE — 86140 C-REACTIVE PROTEIN: CPT

## 2021-04-20 PROCEDURE — 80053 COMPREHEN METABOLIC PANEL: CPT

## 2021-04-20 PROCEDURE — 85652 RBC SED RATE AUTOMATED: CPT

## 2021-04-20 PROCEDURE — 86618 LYME DISEASE ANTIBODY: CPT

## 2021-04-20 PROCEDURE — 84100 ASSAY OF PHOSPHORUS: CPT

## 2021-04-20 PROCEDURE — 82550 ASSAY OF CK (CPK): CPT

## 2021-04-21 LAB
B BURGDOR IGG+IGM SER-ACNC: 17
HCV AB SER QL: NORMAL
RHEUMATOID FACT SER QL LA: NEGATIVE
RYE IGE QN: NEGATIVE

## 2021-04-22 ENCOUNTER — OFFICE VISIT (OUTPATIENT)
Dept: INTERNAL MEDICINE CLINIC | Facility: CLINIC | Age: 86
End: 2021-04-22
Payer: MEDICARE

## 2021-04-22 VITALS
RESPIRATION RATE: 16 BRPM | OXYGEN SATURATION: 97 % | HEART RATE: 84 BPM | BODY MASS INDEX: 28.91 KG/M2 | HEIGHT: 64 IN | TEMPERATURE: 96.5 F | DIASTOLIC BLOOD PRESSURE: 76 MMHG | SYSTOLIC BLOOD PRESSURE: 138 MMHG

## 2021-04-22 DIAGNOSIS — I10 ESSENTIAL HYPERTENSION: ICD-10-CM

## 2021-04-22 DIAGNOSIS — M25.512 CHRONIC LEFT SHOULDER PAIN: ICD-10-CM

## 2021-04-22 DIAGNOSIS — G89.29 CHRONIC LEFT SHOULDER PAIN: ICD-10-CM

## 2021-04-22 DIAGNOSIS — F32.A DEPRESSION, UNSPECIFIED DEPRESSION TYPE: ICD-10-CM

## 2021-04-22 DIAGNOSIS — F41.9 ANXIETY: Primary | ICD-10-CM

## 2021-04-22 PROCEDURE — 99214 OFFICE O/P EST MOD 30 MIN: CPT | Performed by: INTERNAL MEDICINE

## 2021-04-22 RX ORDER — ALPRAZOLAM 0.25 MG/1
0.25 TABLET ORAL 3 TIMES DAILY PRN
Qty: 90 TABLET | Refills: 0 | Status: SHIPPED | OUTPATIENT
Start: 2021-04-22 | End: 2021-04-26
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
## 2021-04-23 NOTE — ASSESSMENT & PLAN NOTE
Blood pressure remains in a safe range reading is 138/76 recommend continuation of lisinopril at 40 mg daily and amlodipine at 2 5 mg daily  Please advise on mychart encounter
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
## 2021-04-26 ENCOUNTER — TELEPHONE (OUTPATIENT)
Dept: INTERNAL MEDICINE CLINIC | Facility: CLINIC | Age: 86
End: 2021-04-26

## 2021-04-26 DIAGNOSIS — F41.9 ANXIETY: ICD-10-CM

## 2021-04-26 RX ORDER — ALPRAZOLAM 0.25 MG/1
0.25 TABLET ORAL 2 TIMES DAILY PRN
Qty: 90 TABLET | Refills: 0
Start: 2021-04-26 | End: 2021-05-28 | Stop reason: SDUPTHER

## 2021-04-26 NOTE — PROGRESS NOTES
Patient called indicates that she did not tolerate the alprazolam 3 times a day meter feel drowsy and lightheaded will go back to twice a day dosing

## 2021-04-26 NOTE — TELEPHONE ENCOUNTER
Patient called and states the alprazolam that was increased to 3x a day has been making her feel dizzy and drowsy all weekend

## 2021-04-26 NOTE — TELEPHONE ENCOUNTER
Pt left a voicemail requesting a call back to discuss medication change made last week and adverse side effects  She can be reached back at [phone]      Thank you

## 2021-04-27 ENCOUNTER — TELEPHONE (OUTPATIENT)
Dept: INTERNAL MEDICINE CLINIC | Facility: CLINIC | Age: 86
End: 2021-04-27

## 2021-04-27 NOTE — TELEPHONE ENCOUNTER
Call returned physical therapists feels the patient has plateaued of refer back to Orthopedics for further evaluation possibly more injury than originally diagnosed

## 2021-04-27 NOTE — TELEPHONE ENCOUNTER
Vaughn Downs from [facility] would like to speak to you regarding Physical therapy for this patient  Vaughn Downs can be reached at [phone]

## 2021-05-17 ENCOUNTER — OFFICE VISIT (OUTPATIENT)
Dept: OBGYN CLINIC | Facility: OTHER | Age: 86
End: 2021-05-17
Payer: MEDICARE

## 2021-05-17 ENCOUNTER — TELEPHONE (OUTPATIENT)
Dept: OBGYN CLINIC | Facility: HOSPITAL | Age: 86
End: 2021-05-17

## 2021-05-17 VITALS
BODY MASS INDEX: 28.17 KG/M2 | DIASTOLIC BLOOD PRESSURE: 78 MMHG | HEIGHT: 64 IN | WEIGHT: 165 LBS | SYSTOLIC BLOOD PRESSURE: 148 MMHG | HEART RATE: 86 BPM

## 2021-05-17 DIAGNOSIS — S43.004A SHOULDER DISLOCATION, RIGHT, INITIAL ENCOUNTER: ICD-10-CM

## 2021-05-17 DIAGNOSIS — M24.811 INTERNAL DERANGEMENT OF SHOULDER, RIGHT: Primary | ICD-10-CM

## 2021-05-17 PROCEDURE — 99214 OFFICE O/P EST MOD 30 MIN: CPT | Performed by: ORTHOPAEDIC SURGERY

## 2021-05-17 RX ORDER — LORAZEPAM 1 MG/1
1 TABLET ORAL
Qty: 2 TABLET | Refills: 0 | Status: CANCELLED | OUTPATIENT
Start: 2021-05-17
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
## 2021-05-17 NOTE — PROGRESS NOTES
Assessment  Diagnoses and all orders for this visit:    Internal derangement of shoulder, right    Shoulder dislocation, right, initial encounter      Discussion and Plan:    Patient has an examination worrisome for rotator cuff pathology s/p shoulder dislocation, given their lack of improvement with physical therapy she is indicated this time for an MRI scan to evaluate for surgical pathology  We will review the results of the study when it has been obtained and discuss further treatment options  Patient was concerned about the claustrophobic effect of an MRI scan so I did offer her an axial lytic but upon reviewing the [facility] controlled substance database she is receiving Xanax from her primary care physician which would preclude her from receiving any more benzodiazepines so we have recommended if she has anxiety with the MRI she take her normal dose of Xanax to help relax her  She could also address this with her primary care physician to see if he has any other suggestions help her anxiety/claustrophobia with an MRI      Subjective:   Patient ID: Kailee Cassidy is a 82 y o  female      HPI  The patient presents with a chief complaint of right shoulder pain  The pain began 6 month(s) ago and is associated with an acute injury  Patient reports on 11/29/21 she slipped on the hardwood floor at home resulting in a shoulder dislocation  She was seen in the ED where it took several attempts to reduce the shoulder  Patient then followed up with Dr Griffin Joseph who provided with the patient with a script for PT  Patient has been attending PT at [place] [address]  without benefit  The patient describes the pain as aching and dull in intensity,  intermittent in timing, and localizes the pain to the  right globally  The pain is worse with movement and relieved by rest   The pain is not associated with numbness and tingling  The pain is not associated with constitutional symptoms   The patient is awoken at night by the pain  The patient reports she has difficulty doing her hair and is unable drive  The following portions of the patient's history were reviewed and updated as appropriate: allergies, current medications, past family history, past medical history, past social history, past surgical history and problem list     Review of Systems   Constitutional: Negative for chills and fever  HENT: Negative for drooling and sneezing  Eyes: Negative for redness  Respiratory: Negative for cough and wheezing  Gastrointestinal: Negative for nausea and vomiting  Musculoskeletal:        Please see ortho exam   Psychiatric/Behavioral: Negative for behavioral problems  The patient is not nervous/anxious  Objective:  /78   Pulse 86   Ht 5' 4" (1 626 m)   Wt 74 8 kg (165 lb)   BMI 28 32 kg/m²       Right Shoulder Exam     Tenderness   The patient is experiencing no tenderness  Range of Motion   External rotation: 0   Forward flexion: 90   Internal rotation 0 degrees: Lumbar     Muscle Strength   Abduction: 3/5   External rotation: 3/5     Other   Erythema: absent  Sensation: normal  Pulse: present            Physical Exam  Vitals signs reviewed  Constitutional:       Appearance: She is well-developed  Eyes:      Pupils: Pupils are equal, round, and reactive to light  Pulmonary:      Effort: Pulmonary effort is normal       Breath sounds: Normal breath sounds  Skin:     General: Skin is warm and dry  Neurological:      Mental Status: She is alert and oriented to person, place, and time  Psychiatric:         Behavior: Behavior normal          Thought Content: Thought content normal          Judgment: Judgment normal        I have personally reviewed pertinent films in PACS and my interpretation is as follows  Right shoulder x-rays from 11/29/21 demonstrate well reduced satisfactory alignment        Scribe Attestation    I,:  Lg Pandya am acting as a scribe while in the presence of the attending physician :       I,:  Harlene Kanner, MD personally performed the services described in this documentation    as scribed in my presence :

## 2021-05-17 NOTE — TELEPHONE ENCOUNTER
Patient sees Dr Isidoro Nissen    Patient would like to know if there is any harm or danger in her driving      Call back # [phone]

## 2021-05-18 ENCOUNTER — TELEPHONE (OUTPATIENT)
Dept: INTERNAL MEDICINE CLINIC | Facility: CLINIC | Age: 86
End: 2021-05-18

## 2021-05-18 NOTE — TELEPHONE ENCOUNTER
Called and spoke to patient  Message related She understood, and stated she would even take someone with her out on her first drive just to be safe and make sure she can handle it      She stated an MRI was ordered and was wondering if she should continue PT?

## 2021-05-18 NOTE — TELEPHONE ENCOUNTER
Hold PT until we have MRI, but continue with the exercises she learned on her own at home      Thanks Afia Dumont

## 2021-05-18 NOTE — TELEPHONE ENCOUNTER
Pt would like to speak to you regarding a Mri that was ordered by the ortho doctor  She can be reached at [phone]

## 2021-05-18 NOTE — TELEPHONE ENCOUNTER
Call returned to the patient this evening we reviewed the consultation she had with Dr Arabella Bai regarding her shoulder injury  She will be having an MRI to evaluate for possible muscle or tendon injury    She will take alprazolam 0 25 mg prior to the study and have somebody drive her there and back period

## 2021-05-28 DIAGNOSIS — F41.9 ANXIETY: ICD-10-CM

## 2021-05-28 RX ORDER — ALPRAZOLAM 0.25 MG/1
0.25 TABLET ORAL 3 TIMES DAILY PRN
Qty: 90 TABLET | Refills: 0 | Status: SHIPPED | OUTPATIENT
Start: 2021-05-28 | End: 2021-07-08 | Stop reason: SDUPTHER

## 2021-06-07 ENCOUNTER — HOSPITAL ENCOUNTER (OUTPATIENT)
Dept: RADIOLOGY | Age: 86
Discharge: HOME | End: 2021-06-07
Payer: MEDICARE

## 2021-06-07 DIAGNOSIS — S43.004A SHOULDER DISLOCATION, RIGHT, INITIAL ENCOUNTER: ICD-10-CM

## 2021-06-07 DIAGNOSIS — M24.811 INTERNAL DERANGEMENT OF SHOULDER, RIGHT: ICD-10-CM

## 2021-06-07 PROCEDURE — 73221 MRI JOINT UPR EXTREM W/O DYE: CPT

## 2021-06-07 PROCEDURE — G1004 CDSM NDSC: HCPCS

## 2021-06-08 ENCOUNTER — TELEPHONE (OUTPATIENT)
Dept: INTERNAL MEDICINE CLINIC | Facility: CLINIC | Age: 86
End: 2021-06-08

## 2021-06-08 ENCOUNTER — TELEPHONE (OUTPATIENT)
Dept: OBGYN CLINIC | Facility: OTHER | Age: 86
End: 2021-06-08

## 2021-06-08 NOTE — TELEPHONE ENCOUNTER
She can take Tylenol 1000 mg every 8 hours  Ice 20 min on and 20 min off  We will review MRI at her appointment

## 2021-06-08 NOTE — TELEPHONE ENCOUNTER
Patient sees Nilo Nina  Patient had MRI done yesterday and is experiencing pain in her R shoulder  She would like to know what can she do? Patient has a follow up on 6/10 145p         Please advise,     Cb#: [phone]

## 2021-06-08 NOTE — TELEPHONE ENCOUNTER
Pt had MRI of shoulder but she was wondering in the mean time if she  can take something else,christiano is not working  She does not see the ortho doctor until Thursday  She can be reached at [phone]

## 2021-06-10 ENCOUNTER — OFFICE VISIT (OUTPATIENT)
Dept: OBGYN CLINIC | Facility: OTHER | Age: 86
End: 2021-06-10
Payer: MEDICARE

## 2021-06-10 VITALS
HEART RATE: 86 BPM | DIASTOLIC BLOOD PRESSURE: 77 MMHG | WEIGHT: 170 LBS | BODY MASS INDEX: 29.18 KG/M2 | SYSTOLIC BLOOD PRESSURE: 153 MMHG

## 2021-06-10 DIAGNOSIS — M12.811 ROTATOR CUFF TEAR ARTHROPATHY OF RIGHT SHOULDER: Primary | ICD-10-CM

## 2021-06-10 DIAGNOSIS — M75.101 ROTATOR CUFF TEAR ARTHROPATHY OF RIGHT SHOULDER: Primary | ICD-10-CM

## 2021-06-10 PROCEDURE — 99214 OFFICE O/P EST MOD 30 MIN: CPT | Performed by: ORTHOPAEDIC SURGERY

## 2021-06-10 PROCEDURE — 20610 DRAIN/INJ JOINT/BURSA W/O US: CPT | Performed by: ORTHOPAEDIC SURGERY

## 2021-06-10 RX ORDER — BETAMETHASONE SODIUM PHOSPHATE AND BETAMETHASONE ACETATE 3; 3 MG/ML; MG/ML
6 INJECTION, SUSPENSION INTRA-ARTICULAR; INTRALESIONAL; INTRAMUSCULAR; SOFT TISSUE
Status: COMPLETED | OUTPATIENT
Start: 2021-06-10 | End: 2021-06-10

## 2021-06-10 RX ORDER — BUPIVACAINE HYDROCHLORIDE 2.5 MG/ML
2 INJECTION, SOLUTION INFILTRATION; PERINEURAL
Status: COMPLETED | OUTPATIENT
Start: 2021-06-10 | End: 2021-06-10

## 2021-06-10 RX ADMIN — BETAMETHASONE SODIUM PHOSPHATE AND BETAMETHASONE ACETATE 6 MG: 3; 3 INJECTION, SUSPENSION INTRA-ARTICULAR; INTRALESIONAL; INTRAMUSCULAR; SOFT TISSUE at 14:06

## 2021-06-10 RX ADMIN — BUPIVACAINE HYDROCHLORIDE 2 ML: 2.5 INJECTION, SOLUTION INFILTRATION; PERINEURAL at 14:06

## 2021-06-10 NOTE — PATIENT INSTRUCTIONS

## 2021-06-10 NOTE — PROGRESS NOTES
Assessment  Diagnoses and all orders for this visit:     Rotator cuff tear arthropathy right shoulder        Discussion and Plan:    · Explained to the patient that her MRI reveals a chronic rotator cuff tear with severe atrophy and mild glenohumeral joint osteoarthritis  The operative and non operative treatments were discussed at length with the patient today  She did not want a surgical procedure, which would be a reverse total shoulder arthroplasty  She was provided with a CS injection today in the office with referral to physical therapy for nonoperative care of her right shoulder  This is documented approprietly below  · Activities as tolerated  Avoid painful maneuvers  · Follow up on an as needed basis    Subjective:   Patient ID: Delaney Villegas is a 82 y o  female      HPI  81 y/o female who presents today for a follow up visit for her right shoulder as well as to discuss MRI results  Patient suffered an anterior dislocation on 11/29/2020 after a fall  Today, she notes continued intermittent pain about the anterolateral aspect of the shoulder, worse with overhead and repetitive motions  She has been performing formal physical therapy with minimal benefit  No numbness or tingling  No fevers or chills  The following portions of the patient's history were reviewed and updated as appropriate: allergies, current medications, past family history, past medical history, past social history, past surgical history and problem list     Review of Systems   Constitutional: Negative for chills, fever and unexpected weight change  HENT: Negative for hearing loss, nosebleeds and sore throat  Eyes: Negative for pain, redness and visual disturbance  Respiratory: Negative for cough, shortness of breath and wheezing  Cardiovascular: Negative for chest pain, palpitations and leg swelling  Gastrointestinal: Negative for abdominal distention, nausea and vomiting     Endocrine: Negative for polydipsia and polyuria  Genitourinary: Negative for dysuria and hematuria  Skin: Negative for rash and wound  Neurological: Negative for dizziness, numbness and headaches  Psychiatric/Behavioral: Negative for decreased concentration and suicidal ideas  Objective:  /77 (BP Location: Left arm, Patient Position: Sitting, Cuff Size: Adult)   Pulse 86   Wt 77 1 kg (170 lb)   BMI 29 18 kg/m²       Right Shoulder Exam     Tenderness   The patient is experiencing no tenderness  Range of Motion   Forward flexion: 110 (Full PROM)   Internal rotation 0 degrees: Lumbar     Muscle Strength   Abduction: 4/5     Other   Erythema: absent  Sensation: normal  Pulse: present              Physical Exam  Constitutional:       Appearance: She is well-developed  Eyes:      Pupils: Pupils are equal, round, and reactive to light  Pulmonary:      Effort: Pulmonary effort is normal       Breath sounds: Normal breath sounds  Skin:     General: Skin is warm and dry  Neurological:      Mental Status: She is alert and oriented to person, place, and time  Psychiatric:         Behavior: Behavior normal          Thought Content: Thought content normal          Judgment: Judgment normal        Large joint arthrocentesis: R subacromial bursa  Universal Protocol:  Consent given by: patient  Time out: Immediately prior to procedure a "time out" was called to verify the correct patient, procedure, equipment, support staff and site/side marked as required    Site marked: the operative site was marked  Supporting Documentation  Indications: pain and diagnostic evaluation   Procedure Details  Location: shoulder - R subacromial bursa  Preparation: Patient was prepped and draped in the usual sterile fashion  Needle size: 22 G  Ultrasound guidance: no  Approach: lateral  Medications administered: 2 mL bupivacaine 0 25 %; 6 mg betamethasone acetate-betamethasone sodium phosphate 6 (3-3) mg/mL    Patient tolerance: patient tolerated the procedure well with no immediate complications  Dressing:  Sterile dressing applied          I have personally reviewed pertinent films in PACS and my interpretation is as follows  MRI Right Shoulder 6/7/2021: Complete tears of the supraspinatus and infraspinatus with retraction and severe fatty muscle atrophy  Mild glenohumeral joint osteoarthritis       Scribe Attestation    I,:  Kizzy Quijano am acting as a scribe while in the presence of the attending physician :       I,:  Alfonso Loza MD personally performed the services described in this documentation    as scribed in my presence :

## 2021-07-08 DIAGNOSIS — F41.9 ANXIETY: ICD-10-CM

## 2021-07-08 RX ORDER — ALPRAZOLAM 0.25 MG/1
0.25 TABLET ORAL 3 TIMES DAILY PRN
Qty: 90 TABLET | Refills: 0 | Status: SHIPPED | OUTPATIENT
Start: 2021-07-08 | End: 2021-07-19

## 2021-07-19 ENCOUNTER — OFFICE VISIT (OUTPATIENT)
Dept: INTERNAL MEDICINE CLINIC | Facility: CLINIC | Age: 86
End: 2021-07-19
Payer: MEDICARE

## 2021-07-19 VITALS
OXYGEN SATURATION: 97 % | HEIGHT: 64 IN | HEART RATE: 86 BPM | TEMPERATURE: 98.2 F | DIASTOLIC BLOOD PRESSURE: 72 MMHG | WEIGHT: 173 LBS | BODY MASS INDEX: 29.53 KG/M2 | SYSTOLIC BLOOD PRESSURE: 126 MMHG

## 2021-07-19 DIAGNOSIS — I10 ESSENTIAL HYPERTENSION: Primary | ICD-10-CM

## 2021-07-19 DIAGNOSIS — F41.9 ANXIETY: ICD-10-CM

## 2021-07-19 DIAGNOSIS — M79.7 FIBROMYALGIA: ICD-10-CM

## 2021-07-19 DIAGNOSIS — F32.A DEPRESSION, UNSPECIFIED DEPRESSION TYPE: ICD-10-CM

## 2021-07-19 PROCEDURE — 99214 OFFICE O/P EST MOD 30 MIN: CPT | Performed by: INTERNAL MEDICINE

## 2021-07-19 RX ORDER — ALPRAZOLAM 0.25 MG/1
0.25 TABLET ORAL 2 TIMES DAILY PRN
Qty: 90 TABLET | Refills: 0
Start: 2021-07-19 | End: 2021-08-13 | Stop reason: SDUPTHER

## 2021-07-19 NOTE — ASSESSMENT & PLAN NOTE
Ongoing symptoms of depression noted in this patient she has struggled with feelings of depression and anxiety since her  passed away  She has been living alone for quite some time now does not seem to tolerate living along well  She is considering moving into a [facility] community in the [facility] certainly endorse this concept think it would be beneficial for the patient to have more access to socialization and interaction with other people

## 2021-07-19 NOTE — ASSESSMENT & PLAN NOTE
Discomfort across the shoulders is consistent with her fibromyalgia suspect these symptoms have increased due to increased stress levels consider massage therapy

## 2021-07-19 NOTE — ASSESSMENT & PLAN NOTE
Blood pressure assessment today indicates adequate control of blood pressure reading is 126/72 by recommend the continuation of lisinopril at 40 mg daily and amlodipine at 2-1/2 mg daily patient has no signs or symptoms of uncontrolled hypertension or hypotension

## 2021-07-19 NOTE — ASSESSMENT & PLAN NOTE
Chronic anxiety symptoms  Patient cannot tolerate more than 2 doses a day of 0 25 mg alprazolam  She experiences too much sedation with higher dosing    Recommend continuation of this dose no indication of addiction or impairment at this dose

## 2021-07-19 NOTE — PROGRESS NOTES
Assessment/Plan:    Hypertension    Blood pressure assessment today indicates adequate control of blood pressure reading is 126/72 by recommend the continuation of lisinopril at 40 mg daily and amlodipine at 2-1/2 mg daily patient has no signs or symptoms of uncontrolled hypertension or hypotension  Anxiety    Chronic anxiety symptoms  Patient cannot tolerate more than 2 doses a day of 0 25 mg alprazolam  She experiences too much sedation with higher dosing  Recommend continuation of this dose no indication of addiction or impairment at this dose    Depression   Ongoing symptoms of depression noted in this patient she has struggled with feelings of depression and anxiety since her  passed away  She has been living alone for quite some time now does not seem to tolerate living along well  She is considering moving into a senior care community in the [facility] certainly endorse this concept think it would be beneficial for the patient to have more access to socialization and interaction with other people  Fibromyalgia    Discomfort across the shoulders is consistent with her fibromyalgia suspect these symptoms have increased due to increased stress levels consider massage therapy       Diagnoses and all orders for this visit:    Essential hypertension  -     Comprehensive metabolic panel; Future    Anxiety  -     ALPRAZolam (XANAX) 0 25 mg tablet; Take 1 tablet (0 25 mg total) by mouth 2 (two) times a day as needed for anxiety        Subjective:      Patient ID: Jasmina Shay is a 82 y o  female  This 51-year-old female patient presents today for acute visit  She presents for evaluation of symptoms that began approximately 1 week ago  She indicates she experienced some back ache across her upper shoulder areas along with increase in flatulence of the bowel as well as a nauseated feeling  She did not have any actual diarrhea nor any actual vomiting symptoms    She did not have any fevers or chills  The symptoms gradually improved and resolved over this past weekend  The patient does admit to increased anxiety symptoms she is struggling with a major decision in her life  She is considering moving out of her home in moving into a local [facility] community  We discussed with the patient today options of for facilities that would meet her needs as far as ability for socialization and interaction with other people as well as her assistance with medical issues  Patient continues with treatment for her chronic right shoulder pain  Reviewed the MRI scan of her shoulder and the consultation note by Dr Geo Julian  Patient is not considering any type of surgical intervention which I believe at her age of 82 years is a reasonable option as she does not need the improved function of her right shoulder to lift her normal activities of daily living     30 minutes was spent with the patient today greater than 50% of this time was spent in counseling and coordination of care  The following portions of the patient's history were reviewed and updated as appropriate:   She  has a past medical history of Epistaxis and Saphenous vein thrombophlebitis, right (2012)    She   Patient Active Problem List    Diagnosis Date Noted    Rotator cuff tear arthropathy of right shoulder 06/10/2021    Internal derangement of shoulder, right 05/17/2021    Arthritis 12/23/2020    Shoulder dislocation, right, initial encounter 12/01/2020    Chronic left shoulder pain 11/05/2020    Other fatigue 05/08/2020    Nail fungus 01/17/2020    Varicose veins of both lower extremities 09/30/2019    Allergy 06/12/2019    Depression 02/28/2019    Bunion of great toe 02/28/2019    Hammertoe of right foot 02/28/2019    Hyperlipidemia 10/31/2018    Glucose intolerance 10/31/2018    Flatulence 10/31/2018    Anxiety 10/09/2014    Fibromyalgia 10/09/2014    Hypertension 10/09/2014     She  has a past surgical history that includes Gallbladder surgery (2006)  Her family history includes Hypertension in her mother  She  reports that she has never smoked  She has never used smokeless tobacco  She reports previous alcohol use  She reports that she does not use drugs  Current Outpatient Medications   Medication Sig Dispense Refill    ALPRAZolam (XANAX) 0 25 mg tablet Take 1 tablet (0 25 mg total) by mouth 2 (two) times a day as needed for anxiety 90 tablet 0    amLODIPine (NORVASC) 2 5 mg tablet Take 1 tablet (2 5 mg total) by mouth daily 90 tablet 2    aspirin 81 MG tablet Take 1 tablet by mouth daily 3 times a week      cholecalciferol (VITAMIN D3) 1,000 units tablet Take by mouth      ciclopirox (PENLAC) 8 % solution Apply topically daily at bedtime 6 6 mL 2    cycloSPORINE (RESTASIS) 0 05 % ophthalmic emulsion Apply to eye      ergocalciferol (VITAMIN D2) 50,000 units TAKE 1 CAPSULE BY MOUTH ONE TIME PER WEEK      famotidine (PEPCID) 20 mg tablet Take 1 tablet by mouth daily      fluticasone (FLONASE) 50 mcg/act nasal spray SPRAY 1 SPRAY INTO EACH NOSTRIL TWICE A DAY 48 mL 3    latanoprost (XALATAN) 0 005 % ophthalmic solution ADMINISTER 1 DROP IN EACH EYE AT BEDTIME      lisinopril (ZESTRIL) 40 mg tablet TAKE 1 TABLET BY MOUTH EVERY DAY 90 tablet 3    loratadine (CLARITIN) 10 mg tablet Take 1 tablet by mouth daily      meloxicam (MOBIC) 7 5 mg tablet Take 1 tablet (7 5 mg total) by mouth daily 14 tablet 1    PREMARIN vaginal cream APPLY 1/4 INCH WITH FINGER TO VAGINA MONDAY AND FRIDAY AT BEDTIME  2    timolol (Betimol) 0 5 % ophthalmic solution Timolol Maleate 0 5 % Ophthalmic Solution  INSTILL DROP Once    Refills: 0       Active      timolol (TIMOPTIC) 0 5 % ophthalmic solution INSTILL 1 DROP INTO RIGHT EYE EVERY DAY  2    ZIOPTAN 0 0015 % ophthalmic solution ADMINISTER 1 DROP OPHTHALMICALLY AT BEDTIME IN THE RIGHT EYE, BRAND NECESSARY  6     No current facility-administered medications for this visit       Review of Systems   Constitutional: Positive for fatigue  Musculoskeletal: Positive for arthralgias  Right shoulder pain / chronic   Psychiatric/Behavioral: Positive for dysphoric mood  The patient is nervous/anxious  All other systems reviewed and are negative  Objective:      /72   Pulse 86   Temp 98 2 °F (36 8 °C)   Ht 5' 4" (1 626 m)   Wt 78 5 kg (173 lb)   SpO2 97%   BMI 29 70 kg/m²          Physical Exam  Vitals reviewed  Constitutional:       General: She is not in acute distress  Appearance: Normal appearance  She is well-developed  She is not ill-appearing  HENT:      Head: Normocephalic  Right Ear: Hearing and external ear normal       Left Ear: Hearing and external ear normal       Nose: Nose normal  No mucosal edema  Mouth/Throat:      Pharynx: Uvula midline  Eyes:      General: Lids are normal       Conjunctiva/sclera: Conjunctivae normal       Pupils: Pupils are equal, round, and reactive to light  Neck:      Thyroid: No thyromegaly  Vascular: No carotid bruit or JVD  Cardiovascular:      Rate and Rhythm: Normal rate and regular rhythm  Heart sounds: Normal heart sounds  No murmur heard  Pulmonary:      Effort: Pulmonary effort is normal  No respiratory distress  Breath sounds: Normal breath sounds  No wheezing, rhonchi or rales  Abdominal:      General: Bowel sounds are normal       Palpations: Abdomen is soft  Musculoskeletal:         General: Normal range of motion  Lymphadenopathy:      Cervical: No cervical adenopathy  Skin:     General: Skin is warm and dry  Neurological:      Mental Status: She is alert and oriented to person, place, and time  Deep Tendon Reflexes: Reflexes are normal and symmetric  Reflexes normal    Psychiatric:         Speech: Speech normal          Behavior: Behavior normal  Behavior is cooperative  Thought Content:  Thought content normal          Judgment: Judgment normal       Comments: Mildly depressed affect presence of anxiety also noted

## 2021-07-23 ENCOUNTER — TELEPHONE (OUTPATIENT)
Dept: INTERNAL MEDICINE CLINIC | Facility: CLINIC | Age: 86
End: 2021-07-23

## 2021-07-23 NOTE — TELEPHONE ENCOUNTER
Patient called and asked for new physical therapy script with [facility] and the fax number is [phone] and the reason for dislocated shoulder    Thank you

## 2021-08-10 ENCOUNTER — TELEPHONE (OUTPATIENT)
Dept: INTERNAL MEDICINE CLINIC | Facility: CLINIC | Age: 86
End: 2021-08-10

## 2021-08-10 NOTE — TELEPHONE ENCOUNTER
Tyler Mcdaniels, physical therapist called because she had discussed with the patient PT for her fibromyalgia in the upper trapezius area  They are asking if we can order PT for this       F:[phone]

## 2021-08-12 DIAGNOSIS — I10 ESSENTIAL HYPERTENSION: ICD-10-CM

## 2021-08-13 DIAGNOSIS — F41.9 ANXIETY: ICD-10-CM

## 2021-08-13 RX ORDER — ALPRAZOLAM 0.25 MG/1
0.25 TABLET ORAL 2 TIMES DAILY PRN
Qty: 90 TABLET | Refills: 0 | Status: SHIPPED | OUTPATIENT
Start: 2021-08-13 | End: 2021-10-04 | Stop reason: SDUPTHER

## 2021-08-13 RX ORDER — AMLODIPINE BESYLATE 2.5 MG/1
TABLET ORAL
Qty: 90 TABLET | Refills: 2 | Status: SHIPPED | OUTPATIENT
Start: 2021-08-13 | End: 2021-08-27

## 2021-08-24 ENCOUNTER — TELEPHONE (OUTPATIENT)
Dept: INTERNAL MEDICINE CLINIC | Facility: CLINIC | Age: 86
End: 2021-08-24

## 2021-08-24 NOTE — TELEPHONE ENCOUNTER
Pt called to state that she gets pounding in her ears on and off and she wondered if you need to speak to her or if she should be concerned or evaluated soon  She has an appt scheduled Friday 09/03/21  She can be reached at [phone]

## 2021-08-24 NOTE — TELEPHONE ENCOUNTER
This is something I will have to see her in the office to evaluate I see an opening this Friday in the afternoon if you want to call her and see if she can make  it into the office this Friday thank you

## 2021-08-27 ENCOUNTER — OFFICE VISIT (OUTPATIENT)
Dept: INTERNAL MEDICINE CLINIC | Facility: CLINIC | Age: 86
End: 2021-08-27
Payer: MEDICARE

## 2021-08-27 VITALS
TEMPERATURE: 97.3 F | OXYGEN SATURATION: 97 % | HEIGHT: 64 IN | BODY MASS INDEX: 29.47 KG/M2 | SYSTOLIC BLOOD PRESSURE: 160 MMHG | DIASTOLIC BLOOD PRESSURE: 90 MMHG | WEIGHT: 172.6 LBS | HEART RATE: 100 BPM

## 2021-08-27 DIAGNOSIS — F41.9 ANXIETY: ICD-10-CM

## 2021-08-27 DIAGNOSIS — I10 ESSENTIAL HYPERTENSION: Primary | ICD-10-CM

## 2021-08-27 PROCEDURE — 99214 OFFICE O/P EST MOD 30 MIN: CPT | Performed by: INTERNAL MEDICINE

## 2021-08-27 RX ORDER — AMLODIPINE BESYLATE 2.5 MG/1
5 TABLET ORAL DAILY
Qty: 90 TABLET | Refills: 2
Start: 2021-08-27 | End: 2021-08-31 | Stop reason: SDUPTHER

## 2021-08-27 NOTE — ASSESSMENT & PLAN NOTE
Patient continues with significant anxiety symptoms  She is currently on alprazolam 0 25 mg twice a day for management of her anxiety  She discussed with me today issues of possible need for replacement he pump in her home another financial concerns  The patient's previous counselor has retired and she is trying to locate a new counselor

## 2021-08-27 NOTE — PROGRESS NOTES
Assessment/Plan:    Hypertension   The patient's blood pressure today is significantly elevated with a reading of 160/90  Heart rate is also somewhat accelerated at 100 beats per minute I suspect the patient's pumping sound she has been experiencing in her ears is related to her elevated blood pressure  She has admitted to increased levels of stress and anxiety which is likely a factor adding to the increased blood pressure  We recommend the patient continue her lisinopril at 40 mg daily in the morning but we have increased her amlodipine to 5 mg in the afternoon a follow-up assessment is requested in approximately 1 week  Anxiety    Patient continues with significant anxiety symptoms  She is currently on alprazolam 0 25 mg twice a day for management of her anxiety  She discussed with me today issues of possible need for replacement he pump in her home another financial concerns  The patient's previous counselor has retired and she is trying to locate a new counselor  There are no diagnoses linked to this encounter  Subjective:      Patient ID: Joseph Gilliam is a 82 y o  female  This 29-year-old female patient presents today for an acute visit  She is here today for evaluation of a pumping sound in her ears more prominent on the right side than the left  She indicates that her hearing seems to be perfectly normal but hears a pumping sound in the ears  She indicates that this started approximately 1-2 weeks ago  She does not seem to have any headache associated with nor any dizziness nausea or vomiting  She indicates that it is present in the morning when she wakes up disappears when she takes her morning medications which include her blood pressure pill lisinopril and then reappears in the afternoon but resolves when she takes her afternoon dose of blood pressure medication  She also finds that the symptoms increase when she is under stress or feeling anxious      A period of 30 minutes was spent with the patient today greater than 50% of this time was spent in counseling and coordination of care  The following portions of the patient's history were reviewed and updated as appropriate:   She  has a past medical history of Epistaxis and Saphenous vein thrombophlebitis, right (2012)  She   Patient Active Problem List    Diagnosis Date Noted    Rotator cuff tear arthropathy of right shoulder 06/10/2021    Internal derangement of shoulder, right 05/17/2021    Arthritis 12/23/2020    Shoulder dislocation, right, initial encounter 12/01/2020    Chronic left shoulder pain 11/05/2020    Other fatigue 05/08/2020    Nail fungus 01/17/2020    Varicose veins of both lower extremities 09/30/2019    Allergy 06/12/2019    Depression 02/28/2019    Bunion of great toe 02/28/2019    Hammertoe of right foot 02/28/2019    Hyperlipidemia 10/31/2018    Glucose intolerance 10/31/2018    Flatulence 10/31/2018    Anxiety 10/09/2014    Fibromyalgia 10/09/2014    Hypertension 10/09/2014     She  has a past surgical history that includes Gallbladder surgery (2006)  Her family history includes Hypertension in her mother  She  reports that she has never smoked  She has never used smokeless tobacco  She reports previous alcohol use  She reports that she does not use drugs    Current Outpatient Medications   Medication Sig Dispense Refill    ALPRAZolam (XANAX) 0 25 mg tablet Take 1 tablet (0 25 mg total) by mouth 2 (two) times a day as needed for anxiety 90 tablet 0    amLODIPine (NORVASC) 2 5 mg tablet Take 2 tablets (5 mg total) by mouth daily 90 tablet 2    aspirin 81 MG tablet Take 1 tablet by mouth daily 3 times a week      cholecalciferol (VITAMIN D3) 1,000 units tablet Take by mouth      ciclopirox (PENLAC) 8 % solution Apply topically daily at bedtime 6 6 mL 2    cycloSPORINE (RESTASIS) 0 05 % ophthalmic emulsion Apply to eye      ergocalciferol (VITAMIN D2) 50,000 units TAKE 1 CAPSULE BY MOUTH ONE TIME PER WEEK      famotidine (PEPCID) 20 mg tablet Take 1 tablet by mouth daily      fluticasone (FLONASE) 50 mcg/act nasal spray SPRAY 1 SPRAY INTO EACH NOSTRIL TWICE A DAY 48 mL 3    latanoprost (XALATAN) 0 005 % ophthalmic solution ADMINISTER 1 DROP IN EACH EYE AT BEDTIME      lisinopril (ZESTRIL) 40 mg tablet TAKE 1 TABLET BY MOUTH EVERY DAY 90 tablet 3    loratadine (CLARITIN) 10 mg tablet Take 1 tablet by mouth daily      PREMARIN vaginal cream APPLY 1/4 INCH WITH FINGER TO VAGINA MONDAY AND FRIDAY AT BEDTIME  2    timolol (Betimol) 0 5 % ophthalmic solution Timolol Maleate 0 5 % Ophthalmic Solution  INSTILL DROP Once    Refills: 0       Active      timolol (TIMOPTIC) 0 5 % ophthalmic solution INSTILL 1 DROP INTO RIGHT EYE EVERY DAY  2    ZIOPTAN 0 0015 % ophthalmic solution ADMINISTER 1 DROP OPHTHALMICALLY AT BEDTIME IN THE RIGHT EYE, BRAND NECESSARY  6     No current facility-administered medications for this visit       Review of Systems   HENT:        Pumping tinnitus   Psychiatric/Behavioral: The patient is nervous/anxious  All other systems reviewed and are negative  Objective:      /90   Pulse 100   Temp (!) 97 3 °F (36 3 °C)   Ht 5' 4" (1 626 m)   Wt 78 3 kg (172 lb 9 6 oz)   SpO2 97%   BMI 29 63 kg/m²          Physical Exam  Vitals reviewed  Constitutional:       General: She is not in acute distress  Appearance: Normal appearance  She is well-developed  She is not ill-appearing  HENT:      Head: Normocephalic  Right Ear: Hearing, tympanic membrane, ear canal and external ear normal       Left Ear: Hearing, tympanic membrane, ear canal and external ear normal       Nose: Nose normal  No mucosal edema  Mouth/Throat:      Mouth: Mucous membranes are moist       Pharynx: Oropharynx is clear  Uvula midline  No oropharyngeal exudate or posterior oropharyngeal erythema  Eyes:      General: Lids are normal  No scleral icterus  Right eye: No discharge  Left eye: No discharge  Conjunctiva/sclera: Conjunctivae normal       Pupils: Pupils are equal, round, and reactive to light  Neck:      Thyroid: No thyromegaly  Vascular: No carotid bruit or JVD  Cardiovascular:      Rate and Rhythm: Normal rate and regular rhythm  Heart sounds: Normal heart sounds  No murmur heard  Pulmonary:      Effort: Pulmonary effort is normal  No respiratory distress  Breath sounds: Normal breath sounds  No wheezing, rhonchi or rales  Abdominal:      General: Bowel sounds are normal       Palpations: Abdomen is soft  Musculoskeletal:         General: Normal range of motion  Cervical back: Normal range of motion and neck supple  No rigidity or tenderness  Right lower leg: No edema  Left lower leg: No edema  Lymphadenopathy:      Cervical: No cervical adenopathy  Skin:     General: Skin is warm and dry  Coloration: Skin is not jaundiced or pale  Neurological:      Mental Status: She is alert and oriented to person, place, and time  Mental status is at baseline  Deep Tendon Reflexes: Reflexes are normal and symmetric  Reflexes normal    Psychiatric:         Speech: Speech normal          Behavior: Behavior normal  Behavior is cooperative  Thought Content:  Thought content normal          Judgment: Judgment normal

## 2021-08-27 NOTE — ASSESSMENT & PLAN NOTE
The patient's blood pressure today is significantly elevated with a reading of 160/90  Heart rate is also somewhat accelerated at 100 beats per minute I suspect the patient's pumping sound she has been experiencing in her ears is related to her elevated blood pressure  She has admitted to increased levels of stress and anxiety which is likely a factor adding to the increased blood pressure  We recommend the patient continue her lisinopril at 40 mg daily in the morning but we have increased her amlodipine to 5 mg in the afternoon a follow-up assessment is requested in approximately 1 week

## 2021-08-31 DIAGNOSIS — I10 ESSENTIAL HYPERTENSION: ICD-10-CM

## 2021-08-31 RX ORDER — AMLODIPINE BESYLATE 2.5 MG/1
5 TABLET ORAL DAILY
Qty: 90 TABLET | Refills: 2 | Status: SHIPPED | OUTPATIENT
Start: 2021-08-31 | End: 2021-11-26

## 2021-09-03 ENCOUNTER — OFFICE VISIT (OUTPATIENT)
Dept: INTERNAL MEDICINE CLINIC | Facility: CLINIC | Age: 86
End: 2021-09-03
Payer: MEDICARE

## 2021-09-03 VITALS
WEIGHT: 173 LBS | DIASTOLIC BLOOD PRESSURE: 70 MMHG | OXYGEN SATURATION: 98 % | TEMPERATURE: 97.9 F | SYSTOLIC BLOOD PRESSURE: 128 MMHG | BODY MASS INDEX: 29.53 KG/M2 | HEIGHT: 64 IN | HEART RATE: 105 BPM

## 2021-09-03 DIAGNOSIS — R14.3 FLATULENCE: ICD-10-CM

## 2021-09-03 DIAGNOSIS — F32.A DEPRESSION, UNSPECIFIED DEPRESSION TYPE: ICD-10-CM

## 2021-09-03 DIAGNOSIS — F41.9 ANXIETY: ICD-10-CM

## 2021-09-03 DIAGNOSIS — I10 ESSENTIAL HYPERTENSION: Primary | ICD-10-CM

## 2021-09-03 PROCEDURE — 99214 OFFICE O/P EST MOD 30 MIN: CPT | Performed by: INTERNAL MEDICINE

## 2021-09-03 NOTE — PROGRESS NOTES
Assessment/Plan:    Hypertension    Assessment of the patient's hypertension indicates improved blood pressure control recommend continuation of lisinopril at 40 mg daily and amlodipine 5 mg daily  Flatulence    Increased flatulence and bloating of the abdomen likely due to diet  Patient was provided with a patient Education document to adjust diet to minimize foods that are likely to create excessive gas production  Depression    Mild depression symptoms continue in this patient  She has been reluctant to start any antidepressant medication  Continue observation and encouragement    Anxiety   Anxiety symptoms persist   The patient is on alprazolam 0 25 mg twice a day higher doses or more frequent doses of the medications seem to cause too much drowsiness  Diagnoses and all orders for this visit:    Essential hypertension    Flatulence    Depression, unspecified depression type        Subjective:      Patient ID: Srinath Oliver is a 82 y o  female  This 79-year-old female patient returns today for a follow-up visit  On her last visit she was noted to have elevated blood pressure and a pumping sensation in her ears  In response to the symptoms and finding of elevated blood pressure we increased her amlodipine dose  The patient indicates that she has had symptoms of headache upset stomach and abdominal bloated feeling for several days  Bowel activity remains unchanged no symptoms of vomiting are noted  Of note the patient has been taking Advil several times a day over the past several days  The following portions of the patient's history were reviewed and updated as appropriate:   She  has a past medical history of Epistaxis and Saphenous vein thrombophlebitis, right (2012)    She   Patient Active Problem List    Diagnosis Date Noted    Rotator cuff tear arthropathy of right shoulder 06/10/2021    Internal derangement of shoulder, right 05/17/2021    Arthritis 12/23/2020    Shoulder dislocation, right, initial encounter 12/01/2020    Chronic left shoulder pain 11/05/2020    Other fatigue 05/08/2020    Nail fungus 01/17/2020    Varicose veins of both lower extremities 09/30/2019    Allergy 06/12/2019    Depression 02/28/2019    Bunion of great toe 02/28/2019    Hammertoe of right foot 02/28/2019    Hyperlipidemia 10/31/2018    Glucose intolerance 10/31/2018    Flatulence 10/31/2018    Anxiety 10/09/2014    Fibromyalgia 10/09/2014    Hypertension 10/09/2014     She  has a past surgical history that includes Gallbladder surgery (2006)  Her family history includes Hypertension in her mother  She  reports that she has never smoked  She has never used smokeless tobacco  She reports previous alcohol use  She reports that she does not use drugs    Current Outpatient Medications   Medication Sig Dispense Refill    ALPRAZolam (XANAX) 0 25 mg tablet Take 1 tablet (0 25 mg total) by mouth 2 (two) times a day as needed for anxiety 90 tablet 0    amLODIPine (NORVASC) 2 5 mg tablet Take 2 tablets (5 mg total) by mouth daily 90 tablet 2    aspirin 81 MG tablet Take 1 tablet by mouth daily 3 times a week      cholecalciferol (VITAMIN D3) 1,000 units tablet Take by mouth      ciclopirox (PENLAC) 8 % solution Apply topically daily at bedtime 6 6 mL 2    cycloSPORINE (RESTASIS) 0 05 % ophthalmic emulsion Apply to eye      ergocalciferol (VITAMIN D2) 50,000 units TAKE 1 CAPSULE BY MOUTH ONE TIME PER WEEK      famotidine (PEPCID) 20 mg tablet Take 1 tablet by mouth daily      fluticasone (FLONASE) 50 mcg/act nasal spray SPRAY 1 SPRAY INTO EACH NOSTRIL TWICE A DAY 48 mL 3    latanoprost (XALATAN) 0 005 % ophthalmic solution ADMINISTER 1 DROP IN EACH EYE AT BEDTIME      lisinopril (ZESTRIL) 40 mg tablet TAKE 1 TABLET BY MOUTH EVERY DAY 90 tablet 3    loratadine (CLARITIN) 10 mg tablet Take 1 tablet by mouth daily      PREMARIN vaginal cream APPLY 1/4 INCH WITH FINGER TO VAGINA MONDAY AND FRIDAY AT BEDTIME  2    timolol (Betimol) 0 5 % ophthalmic solution Timolol Maleate 0 5 % Ophthalmic Solution  INSTILL DROP Once    Refills: 0       Active      timolol (TIMOPTIC) 0 5 % ophthalmic solution INSTILL 1 DROP INTO RIGHT EYE EVERY DAY  2    ZIOPTAN 0 0015 % ophthalmic solution ADMINISTER 1 DROP OPHTHALMICALLY AT BEDTIME IN THE RIGHT EYE, BRAND NECESSARY  6     No current facility-administered medications for this visit       Review of Systems   Gastrointestinal: Positive for abdominal distention  Flatulence   Neurological: Positive for headaches  Psychiatric/Behavioral: Positive for dysphoric mood  The patient is nervous/anxious  All other systems reviewed and are negative  Objective:      /70   Pulse 105   Temp 97 9 °F (36 6 °C)   Ht 5' 4" (1 626 m)   Wt 78 5 kg (173 lb)   SpO2 98%   BMI 29 70 kg/m²          Physical Exam  Vitals reviewed  Constitutional:       General: She is not in acute distress  Appearance: Normal appearance  She is well-developed  She is not ill-appearing  HENT:      Head: Normocephalic  Right Ear: Hearing and external ear normal       Left Ear: Hearing and external ear normal       Nose: Nose normal  No mucosal edema  Mouth/Throat:      Pharynx: Uvula midline  Eyes:      General: Lids are normal       Conjunctiva/sclera: Conjunctivae normal       Pupils: Pupils are equal, round, and reactive to light  Neck:      Thyroid: No thyromegaly  Vascular: No carotid bruit or JVD  Cardiovascular:      Rate and Rhythm: Normal rate and regular rhythm  Heart sounds: Normal heart sounds  No murmur heard  Pulmonary:      Effort: Pulmonary effort is normal  No respiratory distress  Breath sounds: Normal breath sounds  No wheezing, rhonchi or rales  Abdominal:      General: Bowel sounds are normal  There is no distension  Palpations: Abdomen is soft  There is no mass  Tenderness:  There is no abdominal tenderness  There is no guarding  Musculoskeletal:         General: Normal range of motion  Lymphadenopathy:      Cervical: No cervical adenopathy  Skin:     General: Skin is warm and dry  Neurological:      Mental Status: She is alert and oriented to person, place, and time  Mental status is at baseline  Deep Tendon Reflexes: Reflexes are normal and symmetric  Reflexes normal    Psychiatric:         Speech: Speech normal          Behavior: Behavior normal  Behavior is cooperative  Thought Content: Thought content normal          Judgment: Judgment normal        BMI Counseling: Body mass index is 29 7 kg/m²  The BMI is above normal  Nutrition recommendations include reducing portion sizes, decreasing overall calorie intake and moderation in carbohydrate intake

## 2021-09-03 NOTE — ASSESSMENT & PLAN NOTE
Increased flatulence and bloating of the abdomen likely due to diet  Patient was provided with a patient Education document to adjust diet to minimize foods that are likely to create excessive gas production

## 2021-09-03 NOTE — ASSESSMENT & PLAN NOTE
Anxiety symptoms persist   The patient is on alprazolam 0 25 mg twice a day higher doses or more frequent doses of the medications seem to cause too much drowsiness

## 2021-09-03 NOTE — ASSESSMENT & PLAN NOTE
Mild depression symptoms continue in this patient  She has been reluctant to start any antidepressant medication    Continue observation and encouragement

## 2021-09-21 ENCOUNTER — TELEPHONE (OUTPATIENT)
Dept: INTERNAL MEDICINE CLINIC | Facility: CLINIC | Age: 86
End: 2021-09-21

## 2021-09-30 ENCOUNTER — TELEPHONE (OUTPATIENT)
Dept: INTERNAL MEDICINE CLINIC | Facility: CLINIC | Age: 86
End: 2021-09-30

## 2021-09-30 ENCOUNTER — APPOINTMENT (OUTPATIENT)
Dept: LAB | Age: 86
End: 2021-09-30
Payer: MEDICARE

## 2021-09-30 DIAGNOSIS — I10 ESSENTIAL HYPERTENSION: ICD-10-CM

## 2021-09-30 DIAGNOSIS — R39.9 UTI SYMPTOMS: Primary | ICD-10-CM

## 2021-09-30 DIAGNOSIS — R39.9 UTI SYMPTOMS: ICD-10-CM

## 2021-09-30 LAB
ALBUMIN SERPL BCP-MCNC: 3.4 G/DL (ref 3.5–5)
ALP SERPL-CCNC: 89 U/L (ref 46–116)
ALT SERPL W P-5'-P-CCNC: 18 U/L (ref 12–78)
ANION GAP SERPL CALCULATED.3IONS-SCNC: 3 MMOL/L (ref 4–13)
AST SERPL W P-5'-P-CCNC: 14 U/L (ref 5–45)
BACTERIA UR QL AUTO: ABNORMAL /HPF
BILIRUB SERPL-MCNC: 0.37 MG/DL (ref 0.2–1)
BILIRUB UR QL STRIP: NEGATIVE
BUN SERPL-MCNC: 24 MG/DL (ref 5–25)
CALCIUM ALBUM COR SERPL-MCNC: 10.2 MG/DL (ref 8.3–10.1)
CALCIUM SERPL-MCNC: 9.7 MG/DL (ref 8.3–10.1)
CHLORIDE SERPL-SCNC: 106 MMOL/L (ref 100–108)
CLARITY UR: ABNORMAL
CO2 SERPL-SCNC: 27 MMOL/L (ref 21–32)
COLOR UR: YELLOW
CREAT SERPL-MCNC: 0.68 MG/DL (ref 0.6–1.3)
GFR SERPL CREATININE-BSD FRML MDRD: 81 ML/MIN/1.73SQ M
GLUCOSE SERPL-MCNC: 91 MG/DL (ref 65–140)
GLUCOSE UR STRIP-MCNC: NEGATIVE MG/DL
HGB UR QL STRIP.AUTO: NEGATIVE
HYALINE CASTS #/AREA URNS LPF: ABNORMAL /LPF
KETONES UR STRIP-MCNC: NEGATIVE MG/DL
LEUKOCYTE ESTERASE UR QL STRIP: ABNORMAL
NITRITE UR QL STRIP: POSITIVE
NON-SQ EPI CELLS URNS QL MICRO: ABNORMAL /HPF
PH UR STRIP.AUTO: 6 [PH]
POTASSIUM SERPL-SCNC: 4.1 MMOL/L (ref 3.5–5.3)
PROT SERPL-MCNC: 6.9 G/DL (ref 6.4–8.2)
PROT UR STRIP-MCNC: NEGATIVE MG/DL
RBC #/AREA URNS AUTO: ABNORMAL /HPF
SODIUM SERPL-SCNC: 136 MMOL/L (ref 136–145)
SP GR UR STRIP.AUTO: 1.02 (ref 1–1.03)
UROBILINOGEN UR QL STRIP.AUTO: 0.2 E.U./DL
WBC #/AREA URNS AUTO: ABNORMAL /HPF

## 2021-09-30 PROCEDURE — 80053 COMPREHEN METABOLIC PANEL: CPT

## 2021-09-30 PROCEDURE — 36415 COLL VENOUS BLD VENIPUNCTURE: CPT

## 2021-09-30 PROCEDURE — 81001 URINALYSIS AUTO W/SCOPE: CPT

## 2021-09-30 RX ORDER — AMOXICILLIN 500 MG/1
500 CAPSULE ORAL EVERY 8 HOURS SCHEDULED
Qty: 21 CAPSULE | Refills: 0 | Status: SHIPPED | OUTPATIENT
Start: 2021-09-30 | End: 2021-10-18

## 2021-09-30 NOTE — PROGRESS NOTES
Patient called the office today has an increased frequency of urination and some burning on urination as well as a slight irritation on the roof of her mouth  I have asked her to have a urine culture done to check for possible UTI will start her on amoxicillin 500 mg 3 times a day for 7 days

## 2021-09-30 NOTE — TELEPHONE ENCOUNTER
Patient called and might have a slight UTI and having burning and scratchy throat  She goes to [facility] and she goes to [facility] [address]  Please call patient back at [phone]    Thank you

## 2021-10-04 ENCOUNTER — OFFICE VISIT (OUTPATIENT)
Dept: INTERNAL MEDICINE CLINIC | Facility: CLINIC | Age: 86
End: 2021-10-04
Payer: MEDICARE

## 2021-10-04 VITALS
HEIGHT: 64 IN | OXYGEN SATURATION: 98 % | HEART RATE: 97 BPM | TEMPERATURE: 96.9 F | SYSTOLIC BLOOD PRESSURE: 118 MMHG | DIASTOLIC BLOOD PRESSURE: 64 MMHG | WEIGHT: 174.6 LBS | BODY MASS INDEX: 29.81 KG/M2

## 2021-10-04 DIAGNOSIS — M75.101 ROTATOR CUFF TEAR ARTHROPATHY OF RIGHT SHOULDER: ICD-10-CM

## 2021-10-04 DIAGNOSIS — M25.562 ACUTE PAIN OF LEFT KNEE: Primary | ICD-10-CM

## 2021-10-04 DIAGNOSIS — F41.9 ANXIETY: ICD-10-CM

## 2021-10-04 DIAGNOSIS — I10 PRIMARY HYPERTENSION: ICD-10-CM

## 2021-10-04 DIAGNOSIS — F51.02 ADJUSTMENT INSOMNIA: ICD-10-CM

## 2021-10-04 DIAGNOSIS — M12.811 ROTATOR CUFF TEAR ARTHROPATHY OF RIGHT SHOULDER: ICD-10-CM

## 2021-10-04 DIAGNOSIS — Z23 ENCOUNTER FOR IMMUNIZATION: ICD-10-CM

## 2021-10-04 PROCEDURE — 99214 OFFICE O/P EST MOD 30 MIN: CPT | Performed by: INTERNAL MEDICINE

## 2021-10-04 RX ORDER — ALPRAZOLAM 0.25 MG/1
0.25 TABLET ORAL 2 TIMES DAILY PRN
Qty: 90 TABLET | Refills: 0 | Status: SHIPPED | OUTPATIENT
Start: 2021-10-04 | End: 2021-11-30 | Stop reason: SDUPTHER

## 2021-10-05 ENCOUNTER — APPOINTMENT (OUTPATIENT)
Dept: RADIOLOGY | Age: 86
End: 2021-10-05
Payer: MEDICARE

## 2021-10-05 DIAGNOSIS — M25.562 ACUTE PAIN OF LEFT KNEE: ICD-10-CM

## 2021-10-05 PROCEDURE — 73562 X-RAY EXAM OF KNEE 3: CPT

## 2021-10-06 ENCOUNTER — TELEPHONE (OUTPATIENT)
Dept: INTERNAL MEDICINE CLINIC | Facility: CLINIC | Age: 86
End: 2021-10-06

## 2021-10-18 ENCOUNTER — TELEPHONE (OUTPATIENT)
Dept: INTERNAL MEDICINE CLINIC | Facility: CLINIC | Age: 86
End: 2021-10-18

## 2021-10-18 DIAGNOSIS — M79.7 FIBROMYALGIA: Primary | ICD-10-CM

## 2021-10-18 RX ORDER — MELOXICAM 7.5 MG/1
7.5 TABLET ORAL DAILY
Qty: 14 TABLET | Refills: 1 | Status: SHIPPED | OUTPATIENT
Start: 2021-10-18 | End: 2021-12-21

## 2021-10-21 ENCOUNTER — IMMUNIZATIONS (OUTPATIENT)
Dept: INTERNAL MEDICINE CLINIC | Facility: CLINIC | Age: 86
End: 2021-10-21
Payer: MEDICARE

## 2021-10-21 DIAGNOSIS — Z23 ENCOUNTER FOR IMMUNIZATION: Primary | ICD-10-CM

## 2021-10-21 PROCEDURE — G0008 ADMIN INFLUENZA VIRUS VAC: HCPCS | Performed by: INTERNAL MEDICINE

## 2021-10-21 PROCEDURE — 90662 IIV NO PRSV INCREASED AG IM: CPT | Performed by: INTERNAL MEDICINE

## 2021-10-27 ENCOUNTER — OFFICE VISIT (OUTPATIENT)
Dept: OBGYN CLINIC | Facility: OTHER | Age: 86
End: 2021-10-27
Payer: MEDICARE

## 2021-10-27 VITALS
WEIGHT: 174.6 LBS | BODY MASS INDEX: 29.81 KG/M2 | SYSTOLIC BLOOD PRESSURE: 145 MMHG | HEIGHT: 64 IN | HEART RATE: 94 BPM | DIASTOLIC BLOOD PRESSURE: 82 MMHG

## 2021-10-27 DIAGNOSIS — M06.9 RHEUMATOID ARTHRITIS INVOLVING LEFT KNEE, UNSPECIFIED WHETHER RHEUMATOID FACTOR PRESENT (HCC): Primary | ICD-10-CM

## 2021-10-27 PROCEDURE — 20610 DRAIN/INJ JOINT/BURSA W/O US: CPT | Performed by: ORTHOPAEDIC SURGERY

## 2021-10-27 PROCEDURE — 99214 OFFICE O/P EST MOD 30 MIN: CPT | Performed by: ORTHOPAEDIC SURGERY

## 2021-10-27 RX ORDER — METHYLPREDNISOLONE ACETATE 80 MG/ML
1 INJECTION, SUSPENSION INTRA-ARTICULAR; INTRALESIONAL; INTRAMUSCULAR; SOFT TISSUE
Status: COMPLETED | OUTPATIENT
Start: 2021-10-27 | End: 2021-10-27

## 2021-10-27 RX ORDER — BUPIVACAINE HYDROCHLORIDE 2.5 MG/ML
4 INJECTION, SOLUTION INFILTRATION; PERINEURAL
Status: COMPLETED | OUTPATIENT
Start: 2021-10-27 | End: 2021-10-27

## 2021-10-27 RX ADMIN — BUPIVACAINE HYDROCHLORIDE 4 ML: 2.5 INJECTION, SOLUTION INFILTRATION; PERINEURAL at 15:40

## 2021-10-27 RX ADMIN — METHYLPREDNISOLONE ACETATE 1 ML: 80 INJECTION, SUSPENSION INTRA-ARTICULAR; INTRALESIONAL; INTRAMUSCULAR; SOFT TISSUE at 15:40

## 2021-10-28 ENCOUNTER — TELEPHONE (OUTPATIENT)
Dept: OBGYN CLINIC | Facility: OTHER | Age: 86
End: 2021-10-28

## 2021-10-28 ENCOUNTER — TELEPHONE (OUTPATIENT)
Dept: INTERNAL MEDICINE CLINIC | Facility: CLINIC | Age: 86
End: 2021-10-28

## 2021-10-29 ENCOUNTER — OFFICE VISIT (OUTPATIENT)
Dept: INTERNAL MEDICINE CLINIC | Facility: CLINIC | Age: 86
End: 2021-10-29
Payer: MEDICARE

## 2021-10-29 VITALS
DIASTOLIC BLOOD PRESSURE: 72 MMHG | HEIGHT: 64 IN | OXYGEN SATURATION: 98 % | WEIGHT: 175 LBS | BODY MASS INDEX: 29.88 KG/M2 | SYSTOLIC BLOOD PRESSURE: 130 MMHG | HEART RATE: 88 BPM

## 2021-10-29 DIAGNOSIS — I10 PRIMARY HYPERTENSION: Primary | ICD-10-CM

## 2021-10-29 DIAGNOSIS — F41.9 ANXIETY: ICD-10-CM

## 2021-10-29 PROCEDURE — 99213 OFFICE O/P EST LOW 20 MIN: CPT | Performed by: NURSE PRACTITIONER

## 2021-11-03 ENCOUNTER — OFFICE VISIT (OUTPATIENT)
Dept: OBGYN CLINIC | Facility: OTHER | Age: 86
End: 2021-11-03
Payer: MEDICARE

## 2021-11-03 VITALS
HEIGHT: 64 IN | BODY MASS INDEX: 29.88 KG/M2 | WEIGHT: 175 LBS | SYSTOLIC BLOOD PRESSURE: 136 MMHG | HEART RATE: 77 BPM | DIASTOLIC BLOOD PRESSURE: 79 MMHG

## 2021-11-03 DIAGNOSIS — M12.811 ROTATOR CUFF TEAR ARTHROPATHY OF RIGHT SHOULDER: Primary | ICD-10-CM

## 2021-11-03 DIAGNOSIS — M75.101 ROTATOR CUFF TEAR ARTHROPATHY OF RIGHT SHOULDER: Primary | ICD-10-CM

## 2021-11-03 PROCEDURE — 99214 OFFICE O/P EST MOD 30 MIN: CPT | Performed by: ORTHOPAEDIC SURGERY

## 2021-11-09 ENCOUNTER — TELEPHONE (OUTPATIENT)
Dept: INTERNAL MEDICINE CLINIC | Facility: CLINIC | Age: 86
End: 2021-11-09

## 2021-11-25 DIAGNOSIS — I10 ESSENTIAL HYPERTENSION: ICD-10-CM

## 2021-11-26 RX ORDER — AMLODIPINE BESYLATE 2.5 MG/1
TABLET ORAL
Qty: 180 TABLET | Refills: 1 | Status: SHIPPED | OUTPATIENT
Start: 2021-11-26 | End: 2022-05-14

## 2021-11-29 ENCOUNTER — TELEPHONE (OUTPATIENT)
Dept: OBGYN CLINIC | Facility: HOSPITAL | Age: 86
End: 2021-11-29

## 2021-11-30 DIAGNOSIS — F41.9 ANXIETY: ICD-10-CM

## 2021-11-30 RX ORDER — ALPRAZOLAM 0.25 MG/1
0.25 TABLET ORAL 2 TIMES DAILY PRN
Qty: 90 TABLET | Refills: 0 | Status: SHIPPED | OUTPATIENT
Start: 2021-11-30 | End: 2022-01-07 | Stop reason: SDUPTHER

## 2021-12-01 PROCEDURE — U0005 INFEC AGEN DETEC AMPLI PROBE: HCPCS | Performed by: INTERNAL MEDICINE

## 2021-12-01 PROCEDURE — U0003 INFECTIOUS AGENT DETECTION BY NUCLEIC ACID (DNA OR RNA); SEVERE ACUTE RESPIRATORY SYNDROME CORONAVIRUS 2 (SARS-COV-2) (CORONAVIRUS DISEASE [COVID-19]), AMPLIFIED PROBE TECHNIQUE, MAKING USE OF HIGH THROUGHPUT TECHNOLOGIES AS DESCRIBED BY CMS-2020-01-R: HCPCS | Performed by: INTERNAL MEDICINE

## 2021-12-04 DIAGNOSIS — T78.40XD ALLERGY, SUBSEQUENT ENCOUNTER: ICD-10-CM

## 2021-12-04 DIAGNOSIS — I10 ESSENTIAL HYPERTENSION: ICD-10-CM

## 2021-12-06 ENCOUNTER — OFFICE VISIT (OUTPATIENT)
Dept: INTERNAL MEDICINE CLINIC | Facility: CLINIC | Age: 86
End: 2021-12-06
Payer: MEDICARE

## 2021-12-06 VITALS
SYSTOLIC BLOOD PRESSURE: 130 MMHG | WEIGHT: 176.4 LBS | OXYGEN SATURATION: 97 % | DIASTOLIC BLOOD PRESSURE: 78 MMHG | BODY MASS INDEX: 30.11 KG/M2 | HEIGHT: 64 IN | HEART RATE: 91 BPM

## 2021-12-06 DIAGNOSIS — M25.562 ACUTE PAIN OF LEFT KNEE: ICD-10-CM

## 2021-12-06 DIAGNOSIS — I10 PRIMARY HYPERTENSION: Primary | ICD-10-CM

## 2021-12-06 DIAGNOSIS — M19.90 ARTHRITIS: ICD-10-CM

## 2021-12-06 DIAGNOSIS — M24.811 INTERNAL DERANGEMENT OF SHOULDER, RIGHT: ICD-10-CM

## 2021-12-06 DIAGNOSIS — F41.9 ANXIETY: ICD-10-CM

## 2021-12-06 PROCEDURE — 99214 OFFICE O/P EST MOD 30 MIN: CPT | Performed by: INTERNAL MEDICINE

## 2021-12-06 RX ORDER — FLUTICASONE PROPIONATE 50 MCG
SPRAY, SUSPENSION (ML) NASAL
Qty: 48 ML | Refills: 3 | Status: SHIPPED | OUTPATIENT
Start: 2021-12-06

## 2021-12-06 RX ORDER — LISINOPRIL 40 MG/1
TABLET ORAL
Qty: 90 TABLET | Refills: 3 | Status: SHIPPED | OUTPATIENT
Start: 2021-12-06

## 2021-12-06 RX ORDER — FLUCONAZOLE 150 MG/1
TABLET ORAL
COMMUNITY
Start: 2021-11-18

## 2021-12-06 RX ORDER — METRONIDAZOLE 500 MG/1
TABLET ORAL
COMMUNITY
Start: 2021-11-18

## 2021-12-08 ENCOUNTER — APPOINTMENT (OUTPATIENT)
Dept: RADIOLOGY | Age: 86
End: 2021-12-08
Payer: MEDICARE

## 2021-12-08 DIAGNOSIS — M19.90 ARTHRITIS: ICD-10-CM

## 2021-12-08 PROCEDURE — 73502 X-RAY EXAM HIP UNI 2-3 VIEWS: CPT

## 2021-12-08 PROCEDURE — 73630 X-RAY EXAM OF FOOT: CPT

## 2021-12-08 PROCEDURE — 73610 X-RAY EXAM OF ANKLE: CPT

## 2021-12-21 ENCOUNTER — OFFICE VISIT (OUTPATIENT)
Dept: INTERNAL MEDICINE CLINIC | Facility: CLINIC | Age: 86
End: 2021-12-21
Payer: MEDICARE

## 2021-12-21 VITALS
WEIGHT: 175 LBS | DIASTOLIC BLOOD PRESSURE: 76 MMHG | SYSTOLIC BLOOD PRESSURE: 132 MMHG | TEMPERATURE: 96.9 F | HEART RATE: 88 BPM | HEIGHT: 64 IN | BODY MASS INDEX: 29.88 KG/M2 | OXYGEN SATURATION: 97 %

## 2021-12-21 DIAGNOSIS — H61.22 CERUMEN DEBRIS ON TYMPANIC MEMBRANE OF LEFT EAR: ICD-10-CM

## 2021-12-21 DIAGNOSIS — F32.A DEPRESSION, UNSPECIFIED DEPRESSION TYPE: ICD-10-CM

## 2021-12-21 DIAGNOSIS — M19.90 ARTHRITIS: Primary | ICD-10-CM

## 2021-12-21 DIAGNOSIS — R14.3 FLATULENCE: ICD-10-CM

## 2021-12-21 DIAGNOSIS — F41.9 ANXIETY: ICD-10-CM

## 2021-12-21 DIAGNOSIS — I10 PRIMARY HYPERTENSION: ICD-10-CM

## 2021-12-21 PROCEDURE — 99214 OFFICE O/P EST MOD 30 MIN: CPT | Performed by: INTERNAL MEDICINE

## 2022-01-07 DIAGNOSIS — F41.9 ANXIETY: ICD-10-CM

## 2022-01-07 RX ORDER — ALPRAZOLAM 0.25 MG/1
0.25 TABLET ORAL 2 TIMES DAILY PRN
Qty: 90 TABLET | Refills: 0 | Status: SHIPPED | OUTPATIENT
Start: 2022-01-07 | End: 2022-02-24

## 2022-01-22 ENCOUNTER — IMMUNIZATIONS (OUTPATIENT)
Dept: FAMILY MEDICINE CLINIC | Facility: HOSPITAL | Age: 87
End: 2022-01-22

## 2022-01-22 DIAGNOSIS — Z23 ENCOUNTER FOR IMMUNIZATION: Primary | ICD-10-CM

## 2022-01-22 PROCEDURE — 0001A COVID-19 PFIZER VACC 0.3 ML: CPT | Performed by: NURSE PRACTITIONER

## 2022-01-22 PROCEDURE — 91300 COVID-19 PFIZER VACC 0.3 ML: CPT | Performed by: NURSE PRACTITIONER

## 2022-01-26 ENCOUNTER — OFFICE VISIT (OUTPATIENT)
Dept: OBGYN CLINIC | Facility: OTHER | Age: 87
End: 2022-01-26
Payer: MEDICARE

## 2022-01-26 VITALS
DIASTOLIC BLOOD PRESSURE: 75 MMHG | HEIGHT: 64 IN | BODY MASS INDEX: 29.88 KG/M2 | SYSTOLIC BLOOD PRESSURE: 138 MMHG | HEART RATE: 92 BPM | WEIGHT: 175 LBS

## 2022-01-26 DIAGNOSIS — M17.12 PRIMARY OSTEOARTHRITIS OF LEFT KNEE: Primary | ICD-10-CM

## 2022-01-26 PROCEDURE — 99213 OFFICE O/P EST LOW 20 MIN: CPT | Performed by: ORTHOPAEDIC SURGERY

## 2022-01-26 NOTE — PROGRESS NOTES
Orthopaedic Surgery - Office Note  Fela Perdomo (83 y o  female)   : 1935   MRN: [id number]  Encounter Date: 2022    Chief Complaint   Patient presents with    Left Knee - Follow-up       Assessment / Plan  L knee osteoarthritis  Lateral left hip pain secondary to trochanteric bursitis    · We did again review treatment options for her L knee which included conservative verses surgery  She would like to avoid surgery at this time  · Continue Home and outpatient programs for hip and thigh strengthening  We did discuss stretching specifically for trochanteric bursitis  Patient will contact us if she would like to try outpatient physical therapy  · Continue with ICE, heat and NSAIDs/ Analgesics prn  · Going forward We will avoid steroid injections that she had significant reaction to this most previous 1 in her left knee  We did discuss that she can try viscosupplementation in the left knee going forward as another option  · Currently she is not having any ankle pain, I did feel that the swelling she was having was more generalized edema, she does have varicose veins throughout her lower extremity and most likely this is a circulation issue in that will improve with the use of compression stockings  Return in about 3 months (around 2022)  History of Present Illness  Fela Perdomo is a 82 y o  female following up for left knee osteoarthritis  She states that this overall her knee pain has been controlled she has been having some concerns with some numbness that she was feeling on the lateral aspect of her left hip especially at nighttime once line at sleeping or lying on that side  She also has been concerned about some swelling she has had in both lower legs  She did see her PCP who ordered x-rays of her hips and ankles both which showed mild arthritis  She does take ibuprofen regularly and uses topical Salonpas on her knee which she feels does help    At this time she is denying any pain in her ankles  She does not know if her swelling goes upper down through the day but feels it has been getting slowly better  She denies any distal numbness or tingling  On 10/27/21 she did have a steroid injection in her knee that caused her to get hypertensive and anxiety that forced her to see her PCP for evaluation, she feels that has improved at this time  She is denying any back pain at this time and states that her hips are very painful, she just has a feeling of numbness over the lateral aspect when she is lying on them  For the patient's left knee she does not want to try viscosupplementation yet but will contact us if she decides she would like to try this  Review of Systems  Pertinent items are noted in HPI  All other systems were reviewed and are negative  Physical Exam  /75   Pulse 92   Ht 5' 4" (1 626 m)   Wt 79 4 kg (175 lb)   BMI 30 04 kg/m²   Cons: Appears well  No apparent distress  Psych: Alert  Oriented x3  Mood and affect normal   Eyes: PERRLA, EOMI  Resp: Normal effort  No audible wheezing or stridor  CV: Palpable pulse  No discernable arrhythmia  No LE edema  Lymph:  No palpable cervical, axillary, or inguinal lymphadenopathy  Skin: Warm  No palpable masses  No visible lesions  Neuro: Normal muscle tone  Normal and symmetric DTR's  Left Knee Exam  Alignment:  Correctable genu valgus  Inspection:  Moderate swelling  No edema  No erythema  Palpation:  Lateral joint line tenderness  Trace effusion  ROM:  Knee Extension 0°  Knee Flexion 105°  Strength:  Able to actively extend knee against gravity  Stability:  No objective knee instability  Stable Varus / Valgus stress, Lachman, and Posterior drawer  Tests:  No pertinent positive or negative tests  Patella:  Patella tracks centrally with crepitus  Neurovascular:  Sensation intact in DP/SP/Arreguin/Sa/T nerve distributions  2+ DP & PT pulses    Gait:  Normal     Left Hip Exam  Alignment / Posture:  Normal resting hip posture  Inspection:  No swelling  No erythema  No ecchymosis  No deformity  Palpation:  Moderate tenderness at Lateral aspect of the hip over the trochanteric bursa  ROM:  Hip Flexion 120°  Hip ER 60°  Hip IR 20°  Strength:  5/5 hip flexors and abductors  Stability:  No objective hip instability  Tests:  No pertinent positive or negative tests  Neurovascular:  Sensation intact in DP/SP/Arreguin/Sa/T nerve distributions  Sensation intact in all digital nerve distributions  Toes warm and perfused  Gait:  Normal     Studies Reviewed  I have personally reviewed pertinent films in PACS  MRI of right shoulder - From 06/07/2021 shows complete tears of the supra and infraspinatus tendon with torn tendon edges retracted to the level of the glenoid articular cartilage and associated severe muscle atrophy  High-grade partial tear of the long head of biceps tendon  Small healed bony Bankart lesion  Mild glenohumeral joint osteoarthritis  Procedures  No procedures today  Medical, Surgical, Family, and Social History  The patient's medical history, family history, and social history, were reviewed and updated as appropriate      Past Medical History:   Diagnosis Date    Epistaxis     RESOLVED: 2/18/17    Saphenous vein thrombophlebitis, right 2012       Past Surgical History:   Procedure Laterality Date    GALLBLADDER SURGERY  2006       Family History   Problem Relation Age of Onset    Hypertension Mother        Social History     Occupational History    Not on file   Tobacco Use    Smoking status: Never Smoker    Smokeless tobacco: Never Used   Vaping Use    Vaping Use: Never used   Substance and Sexual Activity    Alcohol use: Not Currently    Drug use: Never    Sexual activity: Not Currently       Allergies   Allergen Reactions    Acetazolamide     Ciprofloxacin Headache and Nausea Only    Nitrofurantoin     Pollen Extract     Sulfa Antibiotics     Keflex [Cephalexin] Itching         Current Outpatient Medications:     ALPRAZolam (XANAX) 0 25 mg tablet, Take 1 tablet (0 25 mg total) by mouth 2 (two) times a day as needed for anxiety, Disp: 90 tablet, Rfl: 0    amLODIPine (NORVASC) 2 5 mg tablet, TAKE 2 TABLETS BY MOUTH DAILY, Disp: 180 tablet, Rfl: 1    aspirin 81 MG tablet, Take 1 tablet by mouth daily 3 times a week, Disp: , Rfl:     cholecalciferol (VITAMIN D3) 1,000 units tablet, Take by mouth, Disp: , Rfl:     ciclopirox (PENLAC) 8 % solution, Apply topically daily at bedtime, Disp: 6 6 mL, Rfl: 2    cycloSPORINE (RESTASIS) 0 05 % ophthalmic emulsion, Apply to eye, Disp: , Rfl:     ergocalciferol (VITAMIN D2) 50,000 units, TAKE 1 CAPSULE BY MOUTH ONE TIME PER WEEK, Disp: , Rfl:     famotidine (PEPCID) 20 mg tablet, Take 1 tablet by mouth daily, Disp: , Rfl:     fluconazole (DIFLUCAN) 150 mg tablet, , Disp: , Rfl:     fluticasone (FLONASE) 50 mcg/act nasal spray, SPRAY 1 SPRAY INTO EACH NOSTRIL TWICE A DAY, Disp: 48 mL, Rfl: 3    latanoprost (XALATAN) 0 005 % ophthalmic solution, ADMINISTER 1 DROP IN EACH EYE AT BEDTIME, Disp: , Rfl:     lisinopril (ZESTRIL) 40 mg tablet, TAKE 1 TABLET BY MOUTH EVERY DAY, Disp: 90 tablet, Rfl: 3    loratadine (CLARITIN) 10 mg tablet, Take 1 tablet by mouth daily, Disp: , Rfl:     metroNIDAZOLE (FLAGYL) 500 mg tablet, , Disp: , Rfl:     PREMARIN vaginal cream, APPLY 1/4 INCH WITH FINGER TO VAGINA MONDAY AND FRIDAY AT BEDTIME, Disp: , Rfl: 2    timolol (Betimol) 0 5 % ophthalmic solution, Timolol Maleate 0 5 % Ophthalmic Solution INSTILL DROP Once   Refills: 0     Active, Disp: , Rfl:     timolol (TIMOPTIC) 0 5 % ophthalmic solution, INSTILL 1 DROP INTO RIGHT EYE EVERY DAY, Disp: , Rfl: 2    ZIOPTAN 0 0015 % ophthalmic solution, ADMINISTER 1 DROP OPHTHALMICALLY AT BEDTIME IN THE RIGHT EYE, BRAND NECESSARY, Disp: , Rfl: 6      [employer], PA-C    Scribe Attestation    I,:   am acting as a scribe while in the presence of the attending physician :       I,:   personally performed the services described in this documentation    as scribed in my presence :

## 2022-02-14 ENCOUNTER — TELEPHONE (OUTPATIENT)
Dept: OBGYN CLINIC | Facility: HOSPITAL | Age: 87
End: 2022-02-14

## 2022-02-14 NOTE — TELEPHONE ENCOUNTER
I spoke to patient and she denies any deformity  She is able to move there arm and perform ROM  Advised that she should follow the plan from Nov 2021 appt Ice/Heat 20 on 20 off, Tylenol/ NSAIDS if able to take them  She is wondering if PT would help her Knee and Shoulder? She will monitor the shoulder issue and call if not improvement with above remedies  She is also thinking of Visco injection for the knee  She will let us know if she is interested for the 4/27 appt

## 2022-02-14 NOTE — TELEPHONE ENCOUNTER
DR Evgeny Berumen  RE: R shoulder    Patient states DR Bragg/Michael have been treating her R shoulder pain      Patient states her shoulder has been hurting since she drove this weekend and she is not sure how to treat the pain

## 2022-02-14 NOTE — TELEPHONE ENCOUNTER
I did call the patient with cannot leave a voice message as they said that the service was not available  Treatment for her right shoulder will be just as we discussed previously with anti-inflammatories, ice therapy  She is states previously she had a reaction to steroid injections so we would avoid them at this time and the previous mentioned conservative treatments would be her options

## 2022-02-14 NOTE — TELEPHONE ENCOUNTER
Spoke with the patient and discuss conservative treatment for both her shoulder and knee  She will contact us if she would like to try outpatient therapy and would need to script sent to [facility] if if that was the case

## 2022-02-24 ENCOUNTER — OFFICE VISIT (OUTPATIENT)
Dept: INTERNAL MEDICINE CLINIC | Facility: CLINIC | Age: 87
End: 2022-02-24
Payer: MEDICARE

## 2022-02-24 VITALS
WEIGHT: 177 LBS | HEIGHT: 64 IN | TEMPERATURE: 97.9 F | OXYGEN SATURATION: 96 % | HEART RATE: 88 BPM | DIASTOLIC BLOOD PRESSURE: 70 MMHG | SYSTOLIC BLOOD PRESSURE: 128 MMHG | BODY MASS INDEX: 30.22 KG/M2

## 2022-02-24 DIAGNOSIS — F32.A DEPRESSION, UNSPECIFIED DEPRESSION TYPE: ICD-10-CM

## 2022-02-24 DIAGNOSIS — F41.9 ANXIETY: ICD-10-CM

## 2022-02-24 DIAGNOSIS — I10 PRIMARY HYPERTENSION: Primary | ICD-10-CM

## 2022-02-24 DIAGNOSIS — M79.7 FIBROMYALGIA: ICD-10-CM

## 2022-02-24 PROCEDURE — 99214 OFFICE O/P EST MOD 30 MIN: CPT | Performed by: INTERNAL MEDICINE

## 2022-02-24 RX ORDER — ALPRAZOLAM 0.25 MG/1
TABLET ORAL
Qty: 90 TABLET | Refills: 0
Start: 2022-02-24 | End: 2022-04-01 | Stop reason: SDUPTHER

## 2022-02-24 NOTE — ASSESSMENT & PLAN NOTE
Chronic fibromyalgia symptoms have increased over this winter season I believe they are likely secondary to her increase in anxiety symptoms as well    Continue stretching and Tylenol

## 2022-02-24 NOTE — ASSESSMENT & PLAN NOTE
Assessment of blood pressure today indicates good control reading is 128/70 recommend continuation of current therapy including lisinopril 40 milligrams daily and amlodipine 2 5 milligrams 2 tablets daily follow-up in 4 months

## 2022-02-24 NOTE — ASSESSMENT & PLAN NOTE
Patient continues to display mild symptoms of depression previous attempts to treated with medication proved to be of unsuccessful due to side effects the patient has experienced from antidepressant medications    Continue supportive discussion I have encouraged the patient to continue with social activities outside of her house with other people

## 2022-02-24 NOTE — ASSESSMENT & PLAN NOTE
Increase in anxiety over the past several months compounded by the long winter season  Patient having difficulty falling asleep at night will increase p m  Dose of alprazolam to 0 5 milligrams but maintain morning dose of 0 25 milligrams  I wonder if the patient may do better with BuSpar medication for her anxiety  She did not do well with serotonin modifying medications    Follow-up scheduled

## 2022-02-24 NOTE — PROGRESS NOTES
Assessment/Plan:    Hypertension  Assessment of blood pressure today indicates good control reading is 128/70 recommend continuation of current therapy including lisinopril 40 milligrams daily and amlodipine 2 5 milligrams 2 tablets daily follow-up in 4 months    Anxiety  Increase in anxiety over the past several months compounded by the long winter season  Patient having difficulty falling asleep at night will increase p m  Dose of alprazolam to 0 5 milligrams but maintain morning dose of 0 25 milligrams  I wonder if the patient may do better with BuSpar medication for her anxiety  She did not do well with serotonin modifying medications  Follow-up scheduled    Depression  Patient continues to display mild symptoms of depression previous attempts to treated with medication proved to be of unsuccessful due to side effects the patient has experienced from antidepressant medications  Continue supportive discussion I have encouraged the patient to continue with social activities outside of her house with other people    Fibromyalgia  Chronic fibromyalgia symptoms have increased over this winter season I believe they are likely secondary to her increase in anxiety symptoms as well  Continue stretching and Tylenol       Diagnoses and all orders for this visit:    Primary hypertension    Anxiety  -     ALPRAZolam (XANAX) 0 25 mg tablet; Take 1 pill in the am and 2 pills at bedtime        Subjective:      Patient ID: Ermalinda Median is a 82 y o  female  This 82-year-old female patient reports today for a routine follow-up visit  She had a good Re indicating that 1 of her daughters and  came home to visit her for the holiday  She will be turning 87 later this year and is expecting all of her daughters to return home for birthday celebration and family reunion  Patient continues to struggle with anxiety symptoms    There been times through the winter season that she feels very isolated and I suspect has some degree of depression as well as anxiety  She reports having a difficult time falling asleep at nighttime she can sometimes turn her mind off  She has been using alprazolam 0 25 milligrams twice a day and is wondering if she can possibly take a 2nd pill at bedtime  She does not seem to have excessive sedation from the medication will try taking 0 5 milligrams at bedtime and 0 25 milligrams in the morning  During today's visit I have reviewed in detail the patient's of right shoulder pain and left knee pain  I have reviewed the x-ray images of both of these joints with the patient today as well as reviewed her orthopedic consultation notes  She has been offered a viscous injection into the left knee but declined the in therapy at this time  She continues to have significant pain and limited range of motion in the right shoulder  Short of surgical intervention repair of the shoulder does not seem likely  I reviewed this with the patient during today's visit      The following portions of the patient's history were reviewed and updated as appropriate:   She  has a past medical history of Epistaxis and Saphenous vein thrombophlebitis, right (2012)    She   Patient Active Problem List    Diagnosis Date Noted    Primary osteoarthritis of left knee 01/26/2022    Acute pain of left knee 10/04/2021    Adjustment insomnia 10/04/2021    Rotator cuff tear arthropathy of right shoulder 06/10/2021    Internal derangement of shoulder, right 05/17/2021    Arthritis 12/23/2020    Shoulder dislocation, right, initial encounter 12/01/2020    Chronic left shoulder pain 11/05/2020    Other fatigue 05/08/2020    Nail fungus 01/17/2020    Varicose veins of both lower extremities 09/30/2019    Allergy 06/12/2019    Depression 02/28/2019    Bunion of great toe 02/28/2019    Hammertoe of right foot 02/28/2019    Hyperlipidemia 10/31/2018    Glucose intolerance 10/31/2018    Flatulence 10/31/2018  Anxiety 10/09/2014    Fibromyalgia 10/09/2014    Hypertension 10/09/2014     She  has a past surgical history that includes Gallbladder surgery (2006)  Her family history includes Hypertension in her mother  She  reports that she has never smoked  She has never used smokeless tobacco  She reports previous alcohol use  She reports that she does not use drugs       Review of Systems   Musculoskeletal: Positive for arthralgias and myalgias  Psychiatric/Behavioral: Positive for dysphoric mood  The patient is nervous/anxious  All other systems reviewed and are negative  Objective:      /70   Pulse 88   Temp 97 9 °F (36 6 °C)   Ht 5' 4" (1 626 m)   Wt 80 3 kg (177 lb)   SpO2 96%   BMI 30 38 kg/m²          Physical Exam  Vitals reviewed  Constitutional:       General: She is not in acute distress  Appearance: Normal appearance  She is well-developed  She is not ill-appearing  HENT:      Head: Normocephalic  Right Ear: Hearing, tympanic membrane, ear canal and external ear normal       Left Ear: Hearing, tympanic membrane, ear canal and external ear normal       Nose: Nose normal  No mucosal edema  Mouth/Throat:      Pharynx: Uvula midline  Eyes:      General: Lids are normal       Conjunctiva/sclera: Conjunctivae normal       Pupils: Pupils are equal, round, and reactive to light  Neck:      Thyroid: No thyromegaly  Vascular: No carotid bruit or JVD  Cardiovascular:      Rate and Rhythm: Normal rate and regular rhythm  Heart sounds: Normal heart sounds  No murmur heard  Pulmonary:      Effort: Pulmonary effort is normal  No respiratory distress  Breath sounds: Normal breath sounds  No wheezing or rhonchi  Abdominal:      General: Bowel sounds are normal       Palpations: Abdomen is soft  Musculoskeletal:         General: Tenderness present  Normal range of motion  Comments: Tenderness and decreased range of motion of the right shoulder  Tenderness in the left knee   Lymphadenopathy:      Cervical: No cervical adenopathy  Skin:     General: Skin is warm and dry  Neurological:      Mental Status: She is alert and oriented to person, place, and time  Mental status is at baseline  Deep Tendon Reflexes: Reflexes are normal and symmetric  Reflexes normal    Psychiatric:         Speech: Speech normal          Behavior: Behavior normal  Behavior is cooperative  Thought Content:  Thought content normal          Judgment: Judgment normal

## 2022-03-24 ENCOUNTER — TELEPHONE (OUTPATIENT)
Dept: INTERNAL MEDICINE CLINIC | Facility: CLINIC | Age: 87
End: 2022-03-24

## 2022-03-24 NOTE — TELEPHONE ENCOUNTER
I left a message with the patient that I scheduled a virtual on 4/11/22 with Dr Aida Melton at [phone]    Thank you

## 2022-03-24 NOTE — TELEPHONE ENCOUNTER
Patient was asking if she can have the appointment on 4/11/22 when her daughter is in town  Are you ok with a later appointment that day?

## 2022-03-24 NOTE — TELEPHONE ENCOUNTER
Patient called and asked if you would be able to do a telephone appointment with her and her daughter at the end of the day  Her daughter is from out of the area and wanted to go over her moms health and you only have early appointment in the morning and they did not want one that early  She can be reached at [phone]   Thank you

## 2022-04-01 DIAGNOSIS — F41.9 ANXIETY: ICD-10-CM

## 2022-04-01 RX ORDER — ALPRAZOLAM 0.25 MG/1
TABLET ORAL
Qty: 90 TABLET | Refills: 0 | Status: SHIPPED | OUTPATIENT
Start: 2022-04-01 | End: 2022-05-02 | Stop reason: SDUPTHER

## 2022-04-18 ENCOUNTER — TELEPHONE (OUTPATIENT)
Dept: INTERNAL MEDICINE CLINIC | Facility: CLINIC | Age: 87
End: 2022-04-18

## 2022-04-18 NOTE — TELEPHONE ENCOUNTER
Call returned to the patient's daughter we discussed her mother's declining cognitive state and physical state of recommend that she move into assisted living if at all possible family is in agreement will discuss this with the patient when she comes in next week for her visit

## 2022-04-18 NOTE — TELEPHONE ENCOUNTER
Patient's daughter Angelia Montes called asking for a call back to discuss patient  Daughter, Angelia Montes is on the consent form  She wanted to touch base about the patient  They recently discussed assisted living but the daughter noticed some decline in the patient and is wondering if that would be appropriate in your opinion  Also wanted to discuss the patient driving and if she should continue to

## 2022-04-26 ENCOUNTER — OFFICE VISIT (OUTPATIENT)
Dept: INTERNAL MEDICINE CLINIC | Facility: CLINIC | Age: 87
End: 2022-04-26
Payer: MEDICARE

## 2022-04-26 VITALS
TEMPERATURE: 97.7 F | WEIGHT: 176.4 LBS | OXYGEN SATURATION: 91 % | SYSTOLIC BLOOD PRESSURE: 128 MMHG | DIASTOLIC BLOOD PRESSURE: 76 MMHG | HEART RATE: 84 BPM | HEIGHT: 64 IN | BODY MASS INDEX: 30.11 KG/M2

## 2022-04-26 DIAGNOSIS — M17.0 PRIMARY OSTEOARTHRITIS OF BOTH KNEES: ICD-10-CM

## 2022-04-26 DIAGNOSIS — F32.A DEPRESSION, UNSPECIFIED DEPRESSION TYPE: ICD-10-CM

## 2022-04-26 DIAGNOSIS — M24.811 INTERNAL DERANGEMENT OF SHOULDER, RIGHT: ICD-10-CM

## 2022-04-26 DIAGNOSIS — I10 PRIMARY HYPERTENSION: Primary | ICD-10-CM

## 2022-04-26 DIAGNOSIS — Z13.0 SCREENING FOR DEFICIENCY ANEMIA: ICD-10-CM

## 2022-04-26 DIAGNOSIS — Z13.29 SCREENING FOR HYPOTHYROIDISM: ICD-10-CM

## 2022-04-26 DIAGNOSIS — E78.5 HYPERLIPIDEMIA, UNSPECIFIED HYPERLIPIDEMIA TYPE: ICD-10-CM

## 2022-04-26 DIAGNOSIS — F41.9 ANXIETY: ICD-10-CM

## 2022-04-26 PROBLEM — M25.562 ACUTE PAIN OF LEFT KNEE: Status: RESOLVED | Noted: 2021-10-04 | Resolved: 2022-04-26

## 2022-04-26 PROBLEM — S43.004A SHOULDER DISLOCATION, RIGHT, INITIAL ENCOUNTER: Status: RESOLVED | Noted: 2020-12-01 | Resolved: 2022-04-26

## 2022-04-26 PROCEDURE — 99214 OFFICE O/P EST MOD 30 MIN: CPT | Performed by: INTERNAL MEDICINE

## 2022-04-26 RX ORDER — BIMATOPROST 0.01 %
DROPS OPHTHALMIC (EYE)
COMMUNITY
Start: 2022-04-26

## 2022-04-26 NOTE — ASSESSMENT & PLAN NOTE
Blood pressure was assessed during today's visit with the patient I recommend she continue her current medications of lisinopril 40 mg daily and amlodipine 5 mg daily

## 2022-04-26 NOTE — ASSESSMENT & PLAN NOTE
Chronic limitations in range of motion of the right shoulder of are concerning regarding the patient's ability to operate a motor vehicle I have referred her on to the [facility] 's evaluation course to determine if she is safe to continue driving

## 2022-04-26 NOTE — ASSESSMENT & PLAN NOTE
We reviewed the patient's current arthritis of both knees this appears to be progressing gradually  She has more difficulty arising from a seated position and ambulating  She is currently utilizing ibuprofen 2 tablets 2 her 3 times a day for relief of the arthritis symptoms  She has caution to take the medication at all times with food to minimize any stomach irritation in to immediately stop the medication if she develops any heartburn or indigestion and notify us

## 2022-04-26 NOTE — ASSESSMENT & PLAN NOTE
Patient continues to display mild depression symptoms secondary to the loss of her   Believe her depression symptoms are magnified by the fact that she spends a fair amount of time by herself in her home  She would definitely do better in a more social environment

## 2022-04-26 NOTE — PROGRESS NOTES
Assessment/Plan:    Hypertension  Blood pressure was assessed during today's visit with the patient I recommend she continue her current medications of lisinopril 40 mg daily and amlodipine 5 mg daily  Primary osteoarthritis of both knees  We reviewed the patient's current arthritis of both knees this appears to be progressing gradually  She has more difficulty arising from a seated position and ambulating  She is currently utilizing ibuprofen 2 tablets 2 her 3 times a day for relief of the arthritis symptoms  She has caution to take the medication at all times with food to minimize any stomach irritation in to immediately stop the medication if she develops any heartburn or indigestion and notify us  Internal derangement of shoulder, right  Chronic limitations in range of motion of the right shoulder of are concerning regarding the patient's ability to operate a motor vehicle I have referred her on to the [facility] 's evaluation course to determine if she is safe to continue driving  Anxiety  Patient continues on alprazolam for treatment of her now chronic anxiety symptoms  She utilizes 0 25 mg in the morning and takes 0 5 mg at bedtime  She does not seem to be impaired by the medication at this time recommend continuation of this therapy  Depression  Patient continues to display mild depression symptoms secondary to the loss of her   Believe her depression symptoms are magnified by the fact that she spends a fair amount of time by herself in her home  She would definitely do better in a more social environment  Diagnoses and all orders for this visit:    Primary hypertension  -     Comprehensive metabolic panel; Future  -     UA w Reflex to Microscopic w Reflex to Culture -Lab Collect; Future    Hyperlipidemia, unspecified hyperlipidemia type  -     Lipid panel;  Future    Screening for deficiency anemia  -     CBC and differential; Future    Screening for hypothyroidism  - TSH, 3rd generation with Free T4 reflex; Future    Other orders  -     Lumigan 0 01 % ophthalmic drops        Subjective:      Patient ID: Anup Grubbs is a 82 y o  female  This 27-year-old female patient presents today for a follow-up visit  Of during our visit today we have discussed of the situation regarding the patient's current living situation  Had a nice discussion with 1 of the patient's daughters last week could regarding the family's concerns about her continuing to live by herself  They are concerned about her welfare of as she is aging  They would like to see her consider moving into some form of independent living location with options to move into assisted living our skilled nursing as the requirements may change as the patient ages  They are also concerned about her current ability to operate a motor vehicle  The patient does have significant arthritis and stiffness in her shoulders which may potentially be a factor in operating a motor vehicle  I have recommended the patient have a 's evaluation through the [facility] 's program     Patient continues to have arthritis in both of her knees as well as her right shoulder she is currently using ibuprofen to manage this discomfort  The following portions of the patient's history were reviewed and updated as appropriate:   She  has a past medical history of Epistaxis and Saphenous vein thrombophlebitis, right (2012)    She   Patient Active Problem List    Diagnosis Date Noted    Primary osteoarthritis of both knees 01/26/2022    Adjustment insomnia 10/04/2021    Rotator cuff tear arthropathy of right shoulder 06/10/2021    Internal derangement of shoulder, right 05/17/2021    Arthritis 12/23/2020    Chronic left shoulder pain 11/05/2020    Other fatigue 05/08/2020    Nail fungus 01/17/2020    Varicose veins of both lower extremities 09/30/2019    Allergy 06/12/2019    Depression 02/28/2019    Bunion of great toe 02/28/2019    Hammertoe of right foot 02/28/2019    Hyperlipidemia 10/31/2018    Glucose intolerance 10/31/2018    Flatulence 10/31/2018    Anxiety 10/09/2014    Fibromyalgia 10/09/2014    Hypertension 10/09/2014     She  has a past surgical history that includes Gallbladder surgery (2006)  Her family history includes Hypertension in her mother  She  reports that she has never smoked  She has never used smokeless tobacco  She reports previous alcohol use  She reports that she does not use drugs    Current Outpatient Medications   Medication Sig Dispense Refill    ALPRAZolam (XANAX) 0 25 mg tablet Take 1 pill in the am and 2 pills at bedtime 90 tablet 0    amLODIPine (NORVASC) 2 5 mg tablet TAKE 2 TABLETS BY MOUTH DAILY 180 tablet 1    aspirin 81 MG tablet Take 1 tablet by mouth daily 3 times a week      cholecalciferol (VITAMIN D3) 1,000 units tablet Take by mouth      ciclopirox (PENLAC) 8 % solution Apply topically daily at bedtime 6 6 mL 2    cycloSPORINE (RESTASIS) 0 05 % ophthalmic emulsion Apply to eye      ergocalciferol (VITAMIN D2) 50,000 units TAKE 1 CAPSULE BY MOUTH ONE TIME PER WEEK      famotidine (PEPCID) 20 mg tablet Take 1 tablet by mouth daily      fluconazole (DIFLUCAN) 150 mg tablet       fluticasone (FLONASE) 50 mcg/act nasal spray SPRAY 1 SPRAY INTO EACH NOSTRIL TWICE A DAY 48 mL 3    latanoprost (XALATAN) 0 005 % ophthalmic solution ADMINISTER 1 DROP IN EACH EYE AT BEDTIME      lisinopril (ZESTRIL) 40 mg tablet TAKE 1 TABLET BY MOUTH EVERY DAY 90 tablet 3    loratadine (CLARITIN) 10 mg tablet Take 1 tablet by mouth daily      Lumigan 0 01 % ophthalmic drops       metroNIDAZOLE (FLAGYL) 500 mg tablet       PREMARIN vaginal cream APPLY 1/4 INCH WITH FINGER TO VAGINA MONDAY AND FRIDAY AT BEDTIME  2    timolol (Betimol) 0 5 % ophthalmic solution Timolol Maleate 0 5 % Ophthalmic Solution  INSTILL DROP Once    Refills: 0       Active      timolol (TIMOPTIC) 0 5 % ophthalmic solution INSTILL 1 DROP INTO RIGHT EYE EVERY DAY  2    ZIOPTAN 0 0015 % ophthalmic solution ADMINISTER 1 DROP OPHTHALMICALLY AT BEDTIME IN THE RIGHT EYE, BRAND NECESSARY  6     No current facility-administered medications for this visit       Review of Systems   Musculoskeletal: Positive for arthralgias, gait problem and myalgias  Psychiatric/Behavioral: The patient is nervous/anxious  All other systems reviewed and are negative  Objective:      /76   Pulse 84   Temp 97 7 °F (36 5 °C)   Ht 5' 4" (1 626 m)   Wt 80 kg (176 lb 6 4 oz)   SpO2 91%   BMI 30 28 kg/m²          Physical Exam  Vitals reviewed  Constitutional:       General: She is not in acute distress  Appearance: Normal appearance  She is well-developed  She is not ill-appearing  HENT:      Head: Normocephalic  Right Ear: Hearing and external ear normal       Left Ear: Hearing and external ear normal       Nose: Nose normal  No mucosal edema  Mouth/Throat:      Pharynx: Uvula midline  Eyes:      General: Lids are normal       Conjunctiva/sclera: Conjunctivae normal       Pupils: Pupils are equal, round, and reactive to light  Neck:      Thyroid: No thyromegaly  Vascular: No carotid bruit or JVD  Cardiovascular:      Rate and Rhythm: Normal rate and regular rhythm  Heart sounds: Normal heart sounds  No murmur heard  Pulmonary:      Effort: Pulmonary effort is normal  No respiratory distress  Breath sounds: Normal breath sounds  No wheezing or rhonchi  Abdominal:      General: Bowel sounds are normal       Palpations: Abdomen is soft  Musculoskeletal:         General: Normal range of motion  Right lower leg: No edema  Left lower leg: No edema  Lymphadenopathy:      Cervical: No cervical adenopathy  Skin:     General: Skin is warm and dry  Neurological:      Mental Status: She is alert and oriented to person, place, and time        Deep Tendon Reflexes: Reflexes are normal and symmetric  Reflexes normal    Psychiatric:         Speech: Speech normal          Behavior: Behavior normal  Behavior is cooperative  Thought Content:  Thought content normal          Judgment: Judgment normal

## 2022-05-02 DIAGNOSIS — F41.9 ANXIETY: ICD-10-CM

## 2022-05-02 RX ORDER — ALPRAZOLAM 0.25 MG/1
TABLET ORAL
Qty: 90 TABLET | Refills: 0 | Status: SHIPPED | OUTPATIENT
Start: 2022-05-02 | End: 2022-06-01 | Stop reason: SDUPTHER

## 2022-05-14 DIAGNOSIS — I10 ESSENTIAL HYPERTENSION: ICD-10-CM

## 2022-05-14 RX ORDER — AMLODIPINE BESYLATE 2.5 MG/1
TABLET ORAL
Qty: 180 TABLET | Refills: 1 | Status: SHIPPED | OUTPATIENT
Start: 2022-05-14

## 2022-06-01 DIAGNOSIS — F41.9 ANXIETY: ICD-10-CM

## 2022-06-01 RX ORDER — ALPRAZOLAM 0.25 MG/1
TABLET ORAL
Qty: 90 TABLET | Refills: 0 | Status: SHIPPED | OUTPATIENT
Start: 2022-06-01 | End: 2022-06-29 | Stop reason: SDUPTHER

## 2022-06-13 ENCOUNTER — TELEPHONE (OUTPATIENT)
Dept: INTERNAL MEDICINE CLINIC | Facility: CLINIC | Age: 87
End: 2022-06-13

## 2022-06-13 NOTE — TELEPHONE ENCOUNTER
Not clear what is causing that chills she has no other associated symptoms    She is due for comprehensive blood work and I have had her go ahead to the lab in the next day or to check a CBC comprehensive metabolic profile urinalysis and thyroid testing

## 2022-06-13 NOTE — TELEPHONE ENCOUNTER
Patient called and having chills on and off and no other symptoms  Patient asked if you could please call her back at [phone]    Thank you

## 2022-06-15 ENCOUNTER — APPOINTMENT (OUTPATIENT)
Dept: LAB | Age: 87
End: 2022-06-15
Payer: MEDICARE

## 2022-06-15 DIAGNOSIS — E78.5 HYPERLIPIDEMIA, UNSPECIFIED HYPERLIPIDEMIA TYPE: ICD-10-CM

## 2022-06-15 DIAGNOSIS — I10 PRIMARY HYPERTENSION: ICD-10-CM

## 2022-06-15 DIAGNOSIS — Z13.29 SCREENING FOR HYPOTHYROIDISM: ICD-10-CM

## 2022-06-15 DIAGNOSIS — Z13.0 SCREENING FOR DEFICIENCY ANEMIA: ICD-10-CM

## 2022-06-15 LAB
ALBUMIN SERPL BCP-MCNC: 3.4 G/DL (ref 3.5–5)
ALP SERPL-CCNC: 90 U/L (ref 46–116)
ALT SERPL W P-5'-P-CCNC: 19 U/L (ref 12–78)
ANION GAP SERPL CALCULATED.3IONS-SCNC: 5 MMOL/L (ref 4–13)
AST SERPL W P-5'-P-CCNC: 16 U/L (ref 5–45)
BACTERIA UR QL AUTO: ABNORMAL /HPF
BASOPHILS # BLD AUTO: 0.02 THOUSANDS/ΜL (ref 0–0.1)
BASOPHILS NFR BLD AUTO: 0 % (ref 0–1)
BILIRUB SERPL-MCNC: 0.53 MG/DL (ref 0.2–1)
BILIRUB UR QL STRIP: NEGATIVE
BUN SERPL-MCNC: 24 MG/DL (ref 5–25)
CALCIUM ALBUM COR SERPL-MCNC: 10.2 MG/DL (ref 8.3–10.1)
CALCIUM SERPL-MCNC: 9.7 MG/DL (ref 8.3–10.1)
CHLORIDE SERPL-SCNC: 106 MMOL/L (ref 100–108)
CHOLEST SERPL-MCNC: 280 MG/DL
CLARITY UR: CLEAR
CO2 SERPL-SCNC: 26 MMOL/L (ref 21–32)
COLOR UR: COLORLESS
CREAT SERPL-MCNC: 0.74 MG/DL (ref 0.6–1.3)
EOSINOPHIL # BLD AUTO: 0.32 THOUSAND/ΜL (ref 0–0.61)
EOSINOPHIL NFR BLD AUTO: 6 % (ref 0–6)
ERYTHROCYTE [DISTWIDTH] IN BLOOD BY AUTOMATED COUNT: 13 % (ref 11.6–15.1)
GFR SERPL CREATININE-BSD FRML MDRD: 74 ML/MIN/1.73SQ M
GLUCOSE P FAST SERPL-MCNC: 98 MG/DL (ref 65–99)
GLUCOSE UR STRIP-MCNC: NEGATIVE MG/DL
HCT VFR BLD AUTO: 42.5 % (ref 34.8–46.1)
HDLC SERPL-MCNC: 52 MG/DL
HGB BLD-MCNC: 13.9 G/DL (ref 11.5–15.4)
HGB UR QL STRIP.AUTO: ABNORMAL
IMM GRANULOCYTES # BLD AUTO: 0.01 THOUSAND/UL (ref 0–0.2)
IMM GRANULOCYTES NFR BLD AUTO: 0 % (ref 0–2)
KETONES UR STRIP-MCNC: NEGATIVE MG/DL
LDLC SERPL CALC-MCNC: 198 MG/DL (ref 0–100)
LEUKOCYTE ESTERASE UR QL STRIP: ABNORMAL
LYMPHOCYTES # BLD AUTO: 1.78 THOUSANDS/ΜL (ref 0.6–4.47)
LYMPHOCYTES NFR BLD AUTO: 31 % (ref 14–44)
MCH RBC QN AUTO: 29.4 PG (ref 26.8–34.3)
MCHC RBC AUTO-ENTMCNC: 32.7 G/DL (ref 31.4–37.4)
MCV RBC AUTO: 90 FL (ref 82–98)
MONOCYTES # BLD AUTO: 0.62 THOUSAND/ΜL (ref 0.17–1.22)
MONOCYTES NFR BLD AUTO: 11 % (ref 4–12)
NEUTROPHILS # BLD AUTO: 3.01 THOUSANDS/ΜL (ref 1.85–7.62)
NEUTS SEG NFR BLD AUTO: 52 % (ref 43–75)
NITRITE UR QL STRIP: NEGATIVE
NON-SQ EPI CELLS URNS QL MICRO: ABNORMAL /HPF
NONHDLC SERPL-MCNC: 228 MG/DL
NRBC BLD AUTO-RTO: 0 /100 WBCS
PH UR STRIP.AUTO: 6.5 [PH]
PLATELET # BLD AUTO: 267 THOUSANDS/UL (ref 149–390)
PMV BLD AUTO: 10.7 FL (ref 8.9–12.7)
POTASSIUM SERPL-SCNC: 4 MMOL/L (ref 3.5–5.3)
PROT SERPL-MCNC: 7.1 G/DL (ref 6.4–8.2)
PROT UR STRIP-MCNC: NEGATIVE MG/DL
RBC # BLD AUTO: 4.73 MILLION/UL (ref 3.81–5.12)
RBC #/AREA URNS AUTO: ABNORMAL /HPF
SODIUM SERPL-SCNC: 137 MMOL/L (ref 136–145)
SP GR UR STRIP.AUTO: 1.01 (ref 1–1.03)
TRIGL SERPL-MCNC: 149 MG/DL
TSH SERPL DL<=0.05 MIU/L-ACNC: 2.5 UIU/ML (ref 0.45–4.5)
UROBILINOGEN UR STRIP-ACNC: <2 MG/DL
WBC # BLD AUTO: 5.76 THOUSAND/UL (ref 4.31–10.16)
WBC #/AREA URNS AUTO: ABNORMAL /HPF

## 2022-06-15 PROCEDURE — 80061 LIPID PANEL: CPT

## 2022-06-15 PROCEDURE — 36415 COLL VENOUS BLD VENIPUNCTURE: CPT

## 2022-06-15 PROCEDURE — 80053 COMPREHEN METABOLIC PANEL: CPT

## 2022-06-15 PROCEDURE — 81001 URINALYSIS AUTO W/SCOPE: CPT

## 2022-06-15 PROCEDURE — 85025 COMPLETE CBC W/AUTO DIFF WBC: CPT

## 2022-06-15 PROCEDURE — 84443 ASSAY THYROID STIM HORMONE: CPT

## 2022-06-15 PROCEDURE — 87086 URINE CULTURE/COLONY COUNT: CPT

## 2022-06-16 LAB — BACTERIA UR CULT: NORMAL

## 2022-06-17 ENCOUNTER — TELEPHONE (OUTPATIENT)
Dept: INTERNAL MEDICINE CLINIC | Facility: CLINIC | Age: 87
End: 2022-06-17

## 2022-06-20 NOTE — TELEPHONE ENCOUNTER
Please let the patient know that I see no significant abnormalities we will review the results when she comes in for her visit with me on the 29th of June thank you

## 2022-06-29 ENCOUNTER — OFFICE VISIT (OUTPATIENT)
Dept: INTERNAL MEDICINE CLINIC | Facility: CLINIC | Age: 87
End: 2022-06-29
Payer: MEDICARE

## 2022-06-29 VITALS
BODY MASS INDEX: 29.6 KG/M2 | SYSTOLIC BLOOD PRESSURE: 130 MMHG | HEIGHT: 64 IN | DIASTOLIC BLOOD PRESSURE: 76 MMHG | OXYGEN SATURATION: 95 % | TEMPERATURE: 97.2 F | HEART RATE: 84 BPM | WEIGHT: 173.4 LBS

## 2022-06-29 DIAGNOSIS — M19.90 ARTHRITIS: ICD-10-CM

## 2022-06-29 DIAGNOSIS — I10 PRIMARY HYPERTENSION: Primary | ICD-10-CM

## 2022-06-29 DIAGNOSIS — F41.9 ANXIETY: ICD-10-CM

## 2022-06-29 DIAGNOSIS — I83.813 VARICOSE VEINS OF BOTH LOWER EXTREMITIES WITH PAIN: ICD-10-CM

## 2022-06-29 DIAGNOSIS — E78.5 HYPERLIPIDEMIA, UNSPECIFIED HYPERLIPIDEMIA TYPE: ICD-10-CM

## 2022-06-29 PROCEDURE — G0439 PPPS, SUBSEQ VISIT: HCPCS | Performed by: INTERNAL MEDICINE

## 2022-06-29 PROCEDURE — 1123F ACP DISCUSS/DSCN MKR DOCD: CPT | Performed by: INTERNAL MEDICINE

## 2022-06-29 PROCEDURE — 99215 OFFICE O/P EST HI 40 MIN: CPT | Performed by: INTERNAL MEDICINE

## 2022-06-29 RX ORDER — ALPRAZOLAM 0.25 MG/1
TABLET ORAL
Qty: 90 TABLET | Refills: 0 | Status: SHIPPED | OUTPATIENT
Start: 2022-06-29 | End: 2022-08-01 | Stop reason: SDUPTHER

## 2022-06-29 NOTE — ASSESSMENT & PLAN NOTE
Lipid profile reviewed with the patient does show improvement in total cholesterol as well as LDL    Recommend continued careful diet with low-cholesterol content next testing recommended in 1 year

## 2022-06-29 NOTE — ASSESSMENT & PLAN NOTE
Chronic anxiety condition continues  Patient is currently utilizing alprazolam 0 25 mg in the morning and 0 5 mg at bedtime    She does not seem to have any impairment from this medication and a new prescription has been provided for her use today

## 2022-06-29 NOTE — PROGRESS NOTES
Assessment and Plan:     Problem List Items Addressed This Visit        Cardiovascular and Mediastinum    Hypertension - Primary     Hypertension assessment today confirms adequate control blood pressure recommend continuation of lisinopril at 40 mg daily and amlodipine at 5 mg daily next test or evaluation for blood pressure in 3-4 months           Varicose veins of both lower extremities     Asymptomatic varicose veins of both lower extremities no indication of superficial thrombosis recommend use of compression stockings if patient will be on her feet for any extended period of time  Musculoskeletal and Integument    Arthritis     On today's visit with the patient we had an extended discussion regarding her arthritis condition she has advancing arthritis of both knees  In addition she has arthritis of her right shoulder  These conditions have limited her physical capabilities and she has decided to transition her living situation to a senior based living situation with continue of care that may be necessary for her in the future  She is using of lidocaine over-the-counter patches for pain on her knees she can also apply this to her shoulder  Tylenol 1000 mg q 8 hours can also be utilized for pain control  Other    Anxiety     Chronic anxiety condition continues  Patient is currently utilizing alprazolam 0 25 mg in the morning and 0 5 mg at bedtime  She does not seem to have any impairment from this medication and a new prescription has been provided for her use today           Relevant Medications    ALPRAZolam (XANAX) 0 25 mg tablet    Hyperlipidemia     Lipid profile reviewed with the patient does show improvement in total cholesterol as well as LDL  Recommend continued careful diet with low-cholesterol content next testing recommended in 1 year               BMI Counseling: Body mass index is 29 76 kg/m²   The BMI is above normal  Nutrition recommendations include decreasing portion sizes, moderation in carbohydrate intake and reducing intake of cholesterol  Exercise recommendations include exercising 3-5 times per week  No pharmacotherapy was ordered  Rationale for BMI follow-up plan is due to patient being overweight or obese  Preventive health issues were discussed with patient, and age appropriate screening tests were ordered as noted in patient's After Visit Summary  Personalized health advice and appropriate referrals for health education or preventive services given if needed, as noted in patient's After Visit Summary  History of Present Illness:     Patient presents for a Medicare Wellness Visit    This 44-year-old female patient presents today for a annual complete physical examination and review of her current blood work  She continues to experience significant arthritic discomfort and stiffness in both of her knees  She also has arthritic symptoms in her right shoulder  She is looking into moving out of her condominium and moving into a senior based living facility  I have encouraged her to consider this for some time and happy to hear that she is considering this move  The 2 facilities that she is considering both have a continuum of care that would be able to provide her with her medical needs through the rest of her life  She continues to drive her car but if she does make the transition into 1 of these facilities she believes that she probably will not need the car any longer I think this is also an advantage for her at this time  Patient has had no chest pain palpitations or shortness of breath and has had no sign of peripheral edema  She describes no signs or symptoms of active active infection  Patient Care Team:  Daphne Nicole MD as PCP - General  MD Lori Natarajan PA-C     Review of Systems:     Review of Systems   Musculoskeletal: Positive for arthralgias, gait problem and myalgias     Psychiatric/Behavioral: The patient is nervous/anxious  All other systems reviewed and are negative         Problem List:     Patient Active Problem List   Diagnosis    Anxiety    Fibromyalgia    Hypertension    Hyperlipidemia    Glucose intolerance    Flatulence    Depression    Bunion of great toe    Hammertoe of right foot    Allergy    Varicose veins of both lower extremities    Nail fungus    Other fatigue    Chronic left shoulder pain    Arthritis    Internal derangement of shoulder, right    Rotator cuff tear arthropathy of right shoulder    Adjustment insomnia    Primary osteoarthritis of both knees      Past Medical and Surgical History:     Past Medical History:   Diagnosis Date    Epistaxis     RESOLVED: 2/18/17    Saphenous vein thrombophlebitis, right 2012     Past Surgical History:   Procedure Laterality Date    GALLBLADDER SURGERY  2006      Family History:     Family History   Problem Relation Age of Onset    Hypertension Mother       Social History:     Social History     Socioeconomic History    Marital status: /Civil Union     Spouse name: None    Number of children: None    Years of education: None    Highest education level: None   Occupational History    None   Tobacco Use    Smoking status: Never Smoker    Smokeless tobacco: Never Used   Vaping Use    Vaping Use: Never used   Substance and Sexual Activity    Alcohol use: Not Currently    Drug use: Never    Sexual activity: Not Currently   Other Topics Concern    None   Social History Narrative    None     Social Determinants of Health     Financial Resource Strain: Not on file   Food Insecurity: Not on file   Transportation Needs: Not on file   Physical Activity: Not on file   Stress: Not on file   Social Connections: Not on file   Intimate Partner Violence: Not on file   Housing Stability: Not on file      Medications and Allergies:     Current Outpatient Medications   Medication Sig Dispense Refill    ALPRAZolam Amanda Sandy 0 25 mg tablet Take 1 pill in the am and 2 pills at bedtime 90 tablet 0    amLODIPine (NORVASC) 2 5 mg tablet TAKE 2 TABLETS BY MOUTH EVERY  tablet 1    aspirin 81 MG tablet Take 1 tablet by mouth daily 3 times a week      cholecalciferol (VITAMIN D3) 1,000 units tablet Take by mouth      ciclopirox (PENLAC) 8 % solution Apply topically daily at bedtime 6 6 mL 2    cycloSPORINE (RESTASIS) 0 05 % ophthalmic emulsion Apply to eye      ergocalciferol (VITAMIN D2) 50,000 units TAKE 1 CAPSULE BY MOUTH ONE TIME PER WEEK      famotidine (PEPCID) 20 mg tablet Take 1 tablet by mouth daily      fluconazole (DIFLUCAN) 150 mg tablet       fluticasone (FLONASE) 50 mcg/act nasal spray SPRAY 1 SPRAY INTO EACH NOSTRIL TWICE A DAY 48 mL 3    latanoprost (XALATAN) 0 005 % ophthalmic solution ADMINISTER 1 DROP IN EACH EYE AT BEDTIME      lisinopril (ZESTRIL) 40 mg tablet TAKE 1 TABLET BY MOUTH EVERY DAY 90 tablet 3    loratadine (CLARITIN) 10 mg tablet Take 1 tablet by mouth daily      Lumigan 0 01 % ophthalmic drops       metroNIDAZOLE (FLAGYL) 500 mg tablet       PREMARIN vaginal cream APPLY 1/4 INCH WITH FINGER TO VAGINA MONDAY AND FRIDAY AT BEDTIME  2    timolol (Betimol) 0 5 % ophthalmic solution Timolol Maleate 0 5 % Ophthalmic Solution  INSTILL DROP Once    Refills: 0       Active      timolol (TIMOPTIC) 0 5 % ophthalmic solution INSTILL 1 DROP INTO RIGHT EYE EVERY DAY  2    ZIOPTAN 0 0015 % ophthalmic solution ADMINISTER 1 DROP OPHTHALMICALLY AT BEDTIME IN THE RIGHT EYE, BRAND NECESSARY  6     No current facility-administered medications for this visit       Allergies   Allergen Reactions    Acetazolamide     Ciprofloxacin Headache and Nausea Only    Nitrofurantoin     Pollen Extract     Sulfa Antibiotics     Keflex [Cephalexin] Itching      Immunizations:     Immunization History   Administered Date(s) Administered    COVID-19 PFIZER VACCINE 0 3 ML IM 02/03/2021, 02/26/2021, 01/22/2022    Influenza Split High Dose Preservative Free IM 10/18/2016, 10/16/2017    Influenza, high dose seasonal 0 7 mL 10/31/2018, 09/30/2019, 10/13/2020, 10/21/2021    Pneumococcal Conjugate 13-Valent 02/17/2017      Health Maintenance:         Topic Date Due    Hepatitis C Screening  Completed         Topic Date Due    DTaP,Tdap,and Td Vaccines (1 - Tdap) Never done    Pneumococcal Vaccine: 65+ Years (2 - PPSV23 or PCV20) 02/17/2018    COVID-19 Vaccine (4 - Booster for Arguello Peter series) 05/22/2022      Medicare Screening Tests and Risk Assessments: Pina is here for her Subsequent Wellness visit  Health Risk Assessment:   Patient rates overall health as good  Patient feels that their physical health rating is same  Patient is satisfied with their life  Eyesight was rated as same  Hearing was rated as same  Patient feels that their emotional and mental health rating is same  Patients states they are never, rarely angry  Patient states they are sometimes unusually tired/fatigued  Pain experienced in the last 7 days has been some  Patient's pain rating has been 1/10  Patient states that she has experienced no weight loss or gain in last 6 months  Fall Risk Screening: In the past year, patient has experienced: no history of falling in past year      Urinary Incontinence Screening:   Patient has not leaked urine accidently in the last six months  Home Safety:  Patient does not have trouble with stairs inside or outside of their home  Patient has working smoke alarms and has no working carbon monoxide detector  Home safety hazards include: none  Nutrition:   Current diet is Regular  Medications:   Patient is currently taking over-the-counter supplements  OTC medications include: see medication list  Patient is able to manage medications       Activities of Daily Living (ADLs)/Instrumental Activities of Daily Living (IADLs):   Walk and transfer into and out of bed and chair?: Yes  Dress and groom yourself?: Yes    Bathe or shower yourself?: Yes    Feed yourself? Yes  Do your laundry/housekeeping?: Yes  Manage your money, pay your bills and track your expenses?: Yes  Make your own meals?: Yes    Do your own shopping?: Yes    Previous Hospitalizations:   Any hospitalizations or ED visits within the last 12 months?: No      Advance Care Planning:   Living will: Yes    Durable POA for healthcare: Yes    Advanced directive: Yes      PREVENTIVE SCREENINGS      Cardiovascular Screening:    General: Screening Not Indicated and History Lipid Disorder      Diabetes Screening:     General: Screening Current      Colorectal Cancer Screening:     General: Screening Not Indicated      Cervical Cancer Screening:    General: Screening Not Indicated      Osteoporosis Screening:    General: Screening Not Indicated      Abdominal Aortic Aneurysm (AAA) Screening:        General: Screening Not Indicated      Lung Cancer Screening:     General: Screening Not Indicated      Hepatitis C Screening:    General: Screening Current    Screening, Brief Intervention, and Referral to Treatment (SBIRT)    Screening    Typical number of drinks in a week: 0    Single Item Drug Screening:  How often have you used an illegal drug (including marijuana) or a prescription medication for non-medical reasons in the past year? never    Single Item Drug Screen Score: 0  Interpretation: Negative screen for possible drug use disorder    No exam data present     Physical Exam:     /76   Pulse 84   Temp (!) 97 2 °F (36 2 °C)   Ht 5' 4" (1 626 m)   Wt 78 7 kg (173 lb 6 4 oz)   SpO2 95%   BMI 29 76 kg/m²     Physical Exam  Vitals and nursing note reviewed  Constitutional:       Appearance: She is well-developed  She is not ill-appearing  HENT:      Head: Normocephalic and atraumatic        Right Ear: Tympanic membrane, ear canal and external ear normal       Left Ear: Tympanic membrane, ear canal and external ear normal       Nose: Nose normal       Mouth/Throat:      Mouth: Mucous membranes are dry  Pharynx: Oropharynx is clear  Eyes:      General:         Right eye: No discharge  Left eye: No discharge  Extraocular Movements: Extraocular movements intact  Conjunctiva/sclera: Conjunctivae normal       Pupils: Pupils are equal, round, and reactive to light  Neck:      Vascular: No carotid bruit  Cardiovascular:      Rate and Rhythm: Normal rate and regular rhythm  Heart sounds: No murmur heard  Pulmonary:      Effort: Pulmonary effort is normal  No respiratory distress  Breath sounds: Normal breath sounds  No wheezing, rhonchi or rales  Abdominal:      General: Bowel sounds are normal  There is no distension  Palpations: Abdomen is soft  There is no mass  Tenderness: There is no abdominal tenderness  There is no guarding  Musculoskeletal:         General: Tenderness and deformity present  Cervical back: Normal range of motion and neck supple  No rigidity or tenderness  Right lower leg: No edema  Left lower leg: No edema  Comments: Examination of both knees indicates arthritic changes with increased temperature emanating from the joints along with mild effusions bilaterally   Lymphadenopathy:      Cervical: No cervical adenopathy  Skin:     General: Skin is warm and dry  Coloration: Skin is not jaundiced or pale  Neurological:      General: No focal deficit present  Mental Status: She is alert  Mental status is at baseline  Psychiatric:         Mood and Affect: Mood normal          Behavior: Behavior normal          Thought Content:  Thought content normal          Judgment: Judgment normal           Karlie Wasserman MD

## 2022-06-29 NOTE — ASSESSMENT & PLAN NOTE
Hypertension assessment today confirms adequate control blood pressure recommend continuation of lisinopril at 40 mg daily and amlodipine at 5 mg daily next test or evaluation for blood pressure in 3-4 months

## 2022-06-29 NOTE — ASSESSMENT & PLAN NOTE
Asymptomatic varicose veins of both lower extremities no indication of superficial thrombosis recommend use of compression stockings if patient will be on her feet for any extended period of time

## 2022-06-29 NOTE — ASSESSMENT & PLAN NOTE
On today's visit with the patient we had an extended discussion regarding her arthritis condition she has advancing arthritis of both knees  In addition she has arthritis of her right shoulder  These conditions have limited her physical capabilities and she has decided to transition her living situation to a senior based living situation with continue of care that may be necessary for her in the future  She is using of lidocaine over-the-counter patches for pain on her knees she can also apply this to her shoulder  Tylenol 1000 mg q 8 hours can also be utilized for pain control

## 2022-07-04 ENCOUNTER — NURSE TRIAGE (OUTPATIENT)
Dept: OTHER | Facility: OTHER | Age: 87
End: 2022-07-04

## 2022-07-04 NOTE — TELEPHONE ENCOUNTER
Regarding: Cough / throat irrated / laryngitis   ----- Message from Farrukh Carballo sent at 7/4/2022 11:55 AM EDT -----  "I have a cough for a few days and making my throat irrated "

## 2022-07-04 NOTE — TELEPHONE ENCOUNTER
Reason for Disposition   [1] Continuous (nonstop) coughing interferes with work or school AND [2] no improvement using cough treatment per Care Advice    Answer Assessment - Initial Assessment Questions  1  ONSET: "When did the cough begin?"       2 days ago   2  SEVERITY: "How bad is the cough today?"       Persisted   3  SPUTUM: "Describe the color of your sputum" (none, dry cough; clear, white, yellow, green)      Unable to assess   4  HEMOPTYSIS: "Are you coughing up any blood?" If so ask: "How much?" (flecks, streaks, tablespoons, etc )      No   5  DIFFICULTY BREATHING: "Are you having difficulty breathing?" If Yes, ask: "How bad is it?" (e g , mild, moderate, severe)     - MILD: No SOB at rest, mild SOB with walking, speaks normally in sentences, can lay down, no retractions, pulse < 100      - MODERATE: SOB at rest, SOB with minimal exertion and prefers to sit, cannot lie down flat, speaks in phrases, mild retractions, audible wheezing, pulse 100-120      - SEVERE: Very SOB at rest, speaks in single words, struggling to breathe, sitting hunched forward, retractions, pulse > 120       No SOB   6  FEVER: "Do you have a fever?" If Yes, ask: "What is your temperature, how was it measured, and when did it start?"      No   7  CARDIAC HISTORY: "Do you have any history of heart disease?" (e g , heart attack, congestive heart failure)       No   8  LUNG HISTORY: "Do you have any history of lung disease?"  (e g , pulmonary embolus, asthma, emphysema)      No   9  PE RISK FACTORS: "Do you have a history of blood clots?" (or: recent major surgery, recent prolonged travel, bedridden)       No   10  OTHER SYMPTOMS: "Do you have any other symptoms?" (e g , runny nose, wheezing, chest pain)        Sore throat, hoarse voice   11  PREGNANCY: "Is there any chance you are pregnant?" "When was your last menstrual period?"        N/A   12   TRAVEL: "Have you traveled out of the country in the last month?" (e g , travel history, exposures)        N/A    Protocols used: COUGH - ACUTE PRODUCTIVE-ADULT-AH

## 2022-07-05 ENCOUNTER — TELEMEDICINE (OUTPATIENT)
Dept: INTERNAL MEDICINE CLINIC | Facility: CLINIC | Age: 87
End: 2022-07-05
Payer: MEDICARE

## 2022-07-05 ENCOUNTER — TELEPHONE (OUTPATIENT)
Dept: INTERNAL MEDICINE CLINIC | Facility: CLINIC | Age: 87
End: 2022-07-05

## 2022-07-05 VITALS — HEIGHT: 64 IN | BODY MASS INDEX: 29.53 KG/M2 | WEIGHT: 173 LBS

## 2022-07-05 DIAGNOSIS — U07.1 COVID: Primary | ICD-10-CM

## 2022-07-05 PROCEDURE — 99443 PR PHYS/QHP TELEPHONE EVALUATION 21-30 MIN: CPT | Performed by: INTERNAL MEDICINE

## 2022-07-05 RX ORDER — AZITHROMYCIN 250 MG/1
TABLET, FILM COATED ORAL
Qty: 6 TABLET | Refills: 0 | Status: SHIPPED | OUTPATIENT
Start: 2022-07-05 | End: 2022-07-05

## 2022-07-05 RX ORDER — GUAIFENESIN 100 MG/5ML
200 SYRUP ORAL 3 TIMES DAILY PRN
COMMUNITY

## 2022-07-05 NOTE — PROGRESS NOTES
COVID-19 Outpatient Progress Note    Assessment/Plan:    Problem List Items Addressed This Visit        Other    COVID - Primary     This 74-year-old female patient presents today with approximately 4 days of symptoms of cough hoarseness of the throat and sore throat  She has had no fever or chills no GI symptoms of nausea vomiting or diarrhea  She has no symptoms of myalgias or arthralgias beyond what she normally experiences  She initially thought she was coming down with a upper respiratory tract infection and requested that she perform a COVID test today she did the test and it is positive  In view of the positive status of her test we recommended Paxlovid medication for treatment of her COVID  In addition I have recommend the patient maintain extra rest and try to maintain aggressive hydration as well as good nutrition  I recommended she start Tylenol a 1000 mg q 8 hours for any headache or temperature elevation  She should quarantine at home for period of 5 days and then strict masking for period of additional 5 days  I recommend the patient take 2000 units of vitamin-D and 1000 mg of vitamin-C daily as well as zinc 25 mg daily  She knows to call for any excessively high temperatures of 103 or above or any increasing respiratory based symptoms  Disposition:     Discussed symptom directed medication options with patient  Discussed vitamin D, vitamin C, and/or zinc supplementation with patient  I recommended the patient quarantine at home for 5 days after the onset of her symptoms  Patient meets criteria for PAXLOVID and they have been counseled appropriately according to EUA documentation released by the FDA  After discussion, patient agrees to treatment      Ani Flick is an investigational medicine used to treat mild-to-moderate COVID-19 in adults and children (15years of age and older weighing at least 80 pounds (40 kg)) with positive results of direct SARS-CoV-2 viral testing, and who are at high risk for progression to severe COVID-19, including hospitalization or death  PAXLOVID is investigational because it is still being studied  There is limited information about the safety and effectiveness of using PAXLOVID to treat people with mild-to-moderate COVID-19  The FDA has authorized the emergency use of PAXLOVID for the treatment of mild-tomoderate COVID-19 in adults and children (15years of age and older weighing at least 80 pounds (40 kg)) with a positive test for the virus that causes COVID-19, and who are at high risk for progression to severe COVID-19, including hospitalization or death, under an EUA  What should I tell my healthcare provider before I take PAXLOVID? Tell your healthcare provider if you:  - Have any allergies  - Have liver or kidney disease  - Are pregnant or plan to become pregnant  - Are breastfeeding a child  - Have any serious illnesses    Tell your healthcare provider about all the medicines you take, including prescription and over-the-counter medicines, vitamins, and herbal supplements  Some medicines may interact with PAXLOVID and may cause serious side effects  Keep a list of your medicines to show your healthcare provider and pharmacist when you get a new medicine  You can ask your healthcare provider or pharmacist for a list of medicines that interact with PAXLOVID  Do not start taking a new medicine without telling your healthcare provider  Your healthcare provider can tell you if it is safe to take PAXLOVID with other medicines  Tell your healthcare provider if you are taking combined hormonal contraceptive  PAXLOVID may affect how your birth control pills work  Females who are able to become pregnant should use another effective alternative form of contraception or an additional barrier method of contraception  Talk to your healthcare provider if you have any questions about contraceptive methods that might be right for you      How do I take PAXLOVID? PAXLOVID consists of 2 medicines: nirmatrelvir and ritonavir  - Take 2 pink tablets of nirmatrelvir with 1 white tablet of ritonavir by mouth 2 times each day (in the morning and in the evening) for 5 days  For each dose, take all 3 tablets at the same time  - If you have kidney disease, talk to your healthcare provider  You may need a different dose  - Swallow the tablets whole  Do not chew, break, or crush the tablets  - Take PAXLOVID with or without food  - Do not stop taking PAXLOVID without talking to your healthcare provider, even if you feel better  - If you miss a dose of PAXLOVID within 8 hours of the time it is usually taken, take it as soon as you remember  If you miss a dose by more than 8 hours, skip the missed dose and take the next dose at your regular time  Do not take 2 doses of PAXLOVID at the same time  - If you take too much PAXLOVID, call your healthcare provider or go to the nearest hospital emergency room right away  - If you are taking a ritonavir- or cobicistat-containing medicine to treat hepatitis C or Human Immunodeficiency Virus (HIV), you should continue to take your medicine as prescribed by your healthcare provider   - Talk to your healthcare provider if you do not feel better or if you feel worse after 5 days  Who should generally not take PAXLOVID? Do not take PAXLOVID if:  You are allergic to nirmatrelvir, ritonavir, or any of the ingredients in PAXLOVID      You are taking any of the following medicines:  - Alfuzosin  - Pethidine, piroxicam, propoxyphene  - Ranolazine  - Amiodarone, dronedarone, flecainide, propafenone, quinidine  - Colchicine  - Lurasidone, pimozide, clozapine  - Dihydroergotamine, ergotamine, methylergonovine  - Lovastatin, simvastatin  - Sildenafil (Revatio®) for pulmonary arterial hypertension (PAH)  - Triazolam, oral midazolam  - Apalutamide  - Carbamazepine, phenobarbital, phenytoin  - Rifampin  - St  Aarons Wort (hypericum perforatum)    What are the important possible side effects of PAXLOVID? Possible side effects of PAXLOVID are:  - Liver Problems  Tell your healthcare provider right away if you have any of these signs and symptoms of liver problems: loss of appetite, yellowing of your skin and the whites of eyes (jaundice), dark-colored urine, pale colored stools and itchy skin, stomach area (abdominal) pain  - Resistance to HIV Medicines  If you have untreated HIV infection, PAXLOVID may lead to some HIV medicines not working as well in the future  - Other possible side effects include: altered sense of taste, diarrhea, high blood pressure, or muscle aches    These are not all the possible side effects of PAXLOVID  Not many people have taken PAXLOVID  Serious and unexpected side effects may happen  So Angel is still being studied, so it is possible that all of the risks are not known at this time  What other treatment choices are there? Like [facility] may allow for the emergency use of other medicines to treat people with COVID-19  Go to https://[employer]/ for information on the emergency use of other medicines that are authorized by FDA to treat people with COVID-19  Your healthcare provider may talk with you about clinical trials for which you may be eligible  It is your choice to be treated or not to be treated with PAXLOVID  Should you decide not to receive it or for your child not to receive it, it will not change your standard medical care  What if I am pregnant or breastfeeding? There is no experience treating pregnant women or breastfeeding mothers with PAXLOVID  For a mother and unborn baby, the benefit of taking PAXLOVID may be greater than the risk from the treatment  If you are pregnant, discuss your options and specific situation with your healthcare provider      It is recommended that you use effective barrier contraception or do not have sexual activity while taking PAXLOVID  If you are breastfeeding, discuss your options and specific situation with your healthcare provider  How do I report side effects with PAXLOVID? Contact your healthcare provider if you have any side effects that bother you or do not go away  Report side effects to FDA MedWatch at www fda gov/medwatch or call 5-229-BHE6031 or you can report side effects to [employer]  at the contact information provided below  Website Fax number Telephone number   [employer] [phone]     How should I store [address]? Store PAXLOVID tablets at room temperature between 68°F to 77°F (20°C to 25°C)  Full fact sheet for patients, parents, and caregivers can be found at: [employer] [facility]    I have spent 30 minutes directly with the patient  Greater than 50% of this time was spent in counseling/coordination of care regarding: diagnostic results, prognosis, risks and benefits of treatment options, instructions for management, risk factor reductions and impressions  Encounter provider Nehemiah Hernandez MD    Provider located at [place] [address] [address] [place] [id number]    Recent Visits  Date Type Provider Dept   06/29/22 Office Visit Nehemiah Hernandez MD [facility]   Showing recent visits within past 7 days and meeting all other requirements  Today's Visits  Date Type Provider Dept   07/05/22 Telephone Nehemiah Hernandez MD [facility]   07/05/22 Telemedicine Nehemiah Hernandez MD [facility]   Showing today's visits and meeting all other requirements  Future Appointments  No visits were found meeting these conditions    Showing future appointments within next 150 days and meeting all other requirements     This virtual check-in was done via telephone and she agrees to proceed  Patient agrees to participate in a virtual check in via telephone or video visit instead of presenting to the office to address urgent/immediate medical needs  Patient is aware this is a billable service  After connecting through Telephone, the patient was identified by name and date of birth  Modesto Dawson was informed that this was a telemedicine visit and that the exam was being conducted confidentially over secure lines  My office door was closed  No one else was in the room  Modesto Dawson acknowledged consent and understanding of privacy and security of the telemedicine visit  I informed the patient that I have reviewed her record in Epic and presented the opportunity for her to ask any questions regarding the visit today  The patient agreed to participate  It was my intent to perform this visit via video technology but the patient was not able to do a video connection so the visit was completed via audio telephone only  Verification of patient location:  Patient is located in the following state in which I hold an active license: [place]    Subjective: Modesto Dawson is a 82 y o  female who is concerned about COVID-19  Patient's symptoms include sore throat and cough  - Date of symptom onset: 7/2/2022      COVID-19 vaccination status: Fully vaccinated with booster    Exposure:   Contact with a person who is under investigation (PUI) for or who is positive for COVID-19 within the last 14 days?: No    Hospitalized recently for fever and/or lower respiratory symptoms?: No      Currently a healthcare worker that is involved in direct patient care?: No      Works in a special setting where the risk of COVID-19 transmission may be high? (this may include long-term care, correctional and intermediate facilities; homeless shelters; assisted-living facilities and group homes ): No      Resident in a special setting where the risk of COVID-19 transmission may be high? (this may include long-term care, correctional and skilled nursing facilities; homeless shelters; assisted-living facilities and group homes ): No      Lab Results   Component Value Date    SARSCOV2 Negative 12/01/2021     Past Medical History:   Diagnosis Date    Epistaxis     RESOLVED: 2/18/17    Saphenous vein thrombophlebitis, right 2012     Past Surgical History:   Procedure Laterality Date    GALLBLADDER SURGERY  2006     Current Outpatient Medications   Medication Sig Dispense Refill    ALPRAZolam (XANAX) 0 25 mg tablet Take 1 pill in the am and 2 pills at bedtime 90 tablet 0    amLODIPine (NORVASC) 2 5 mg tablet TAKE 2 TABLETS BY MOUTH EVERY  tablet 1    aspirin 81 MG tablet Take 1 tablet by mouth daily 3 times a week      cholecalciferol (VITAMIN D3) 1,000 units tablet Take by mouth      ciclopirox (PENLAC) 8 % solution Apply topically daily at bedtime 6 6 mL 2    cycloSPORINE (RESTASIS) 0 05 % ophthalmic emulsion Apply to eye      ergocalciferol (VITAMIN D2) 50,000 units TAKE 1 CAPSULE BY MOUTH ONE TIME PER WEEK      famotidine (PEPCID) 20 mg tablet Take 1 tablet by mouth daily      fluconazole (DIFLUCAN) 150 mg tablet       fluticasone (FLONASE) 50 mcg/act nasal spray SPRAY 1 SPRAY INTO EACH NOSTRIL TWICE A DAY 48 mL 3    guaiFENesin (ROBITUSSIN) 100 MG/5ML oral liquid Take 200 mg by mouth 3 (three) times a day as needed for cough      latanoprost (XALATAN) 0 005 % ophthalmic solution ADMINISTER 1 DROP IN EACH EYE AT BEDTIME      lisinopril (ZESTRIL) 40 mg tablet TAKE 1 TABLET BY MOUTH EVERY DAY 90 tablet 3    loratadine (CLARITIN) 10 mg tablet Take 1 tablet by mouth daily      Lumigan 0 01 % ophthalmic drops       metroNIDAZOLE (FLAGYL) 500 mg tablet       PREMARIN vaginal cream APPLY 1/4 INCH WITH FINGER TO VAGINA MONDAY AND FRIDAY AT BEDTIME  2    timolol (Betimol) 0 5 % ophthalmic solution Timolol Maleate 0 5 % Ophthalmic Solution  INSTILL DROP Once    Refills: 0       Active  timolol (TIMOPTIC) 0 5 % ophthalmic solution INSTILL 1 DROP INTO RIGHT EYE EVERY DAY  2    ZIOPTAN 0 0015 % ophthalmic solution ADMINISTER 1 DROP OPHTHALMICALLY AT BEDTIME IN THE RIGHT EYE, BRAND NECESSARY  6    nirmatrelvir & ritonavir (Paxlovid) tablet therapy pack Take 3 tablets by mouth 2 (two) times a day for 5 days Take 2 nirmatrelvir tablets + 1 ritonavir tablet together per dose 30 tablet 0     No current facility-administered medications for this visit  Allergies   Allergen Reactions    Acetazolamide     Ciprofloxacin Headache and Nausea Only    Nitrofurantoin     Pollen Extract     Sulfa Antibiotics     Keflex [Cephalexin] Itching       Review of Systems   HENT: Positive for sore throat  Respiratory: Positive for cough  Objective:    Vitals:    07/05/22 1033   Weight: 78 5 kg (173 lb)   Height: 5' 4" (1 626 m)       Physical Exam  Constitutional:       Comments: By telephonic visit today the patient had appears to have a hoarse throat with no significant shortness of breath or cough noted         VIRTUAL VISIT [facility] verbally agrees to participate in [employer]  Pt is aware that [employer] could be limited without vital signs or the ability to perform a full hands-on physical exam  Denisse Pressley Even understands she or the provider may request at any time to terminate the video visit and request the patient to seek care or treatment in person

## 2022-07-05 NOTE — TELEPHONE ENCOUNTER
Call returned to the patient reviewed her diagnosis and therapeutics have been started for her COVID infection

## 2022-07-05 NOTE — ASSESSMENT & PLAN NOTE
This 56-year-old female patient presents today with approximately 4 days of symptoms of cough hoarseness of the throat and sore throat  She has had no fever or chills no GI symptoms of nausea vomiting or diarrhea  She has no symptoms of myalgias or arthralgias beyond what she normally experiences  She initially thought she was coming down with a upper respiratory tract infection and requested that she perform a COVID test today she did the test and it is positive  In view of the positive status of her test we recommended Paxlovid medication for treatment of her COVID  In addition I have recommend the patient maintain extra rest and try to maintain aggressive hydration as well as good nutrition  I recommended she start Tylenol a 1000 mg q 8 hours for any headache or temperature elevation  She should quarantine at home for period of 5 days and then strict masking for period of additional 5 days  I recommend the patient take 2000 units of vitamin-D and 1000 mg of vitamin-C daily as well as zinc 25 mg daily  She knows to call for any excessively high temperatures of 103 or above or any increasing respiratory based symptoms

## 2022-07-05 NOTE — TELEPHONE ENCOUNTER
Patient called back and said since she has covid she thinks you were going to call in something for her  She asked if you could please call her back at [phone]    Thank you

## 2022-07-06 ENCOUNTER — TELEPHONE (OUTPATIENT)
Dept: INTERNAL MEDICINE CLINIC | Facility: CLINIC | Age: 87
End: 2022-07-06

## 2022-07-06 NOTE — TELEPHONE ENCOUNTER
Call returned to the patient and medications for blood pressure as well as COVID infection reviewed with the patient proper dosing reviewed as well

## 2022-07-06 NOTE — TELEPHONE ENCOUNTER
Patient called and asked if you can please call her back due to having a question about her prescriptions that were ordered yesterday  She can be reached at [phone]    Thank you

## 2022-07-11 NOTE — TELEPHONE ENCOUNTER
Pt called in stating she finished her meds and she still feels stuffy  She is requesting a call back

## 2022-08-01 DIAGNOSIS — F41.9 ANXIETY: ICD-10-CM

## 2022-08-01 RX ORDER — ALPRAZOLAM 0.25 MG/1
TABLET ORAL
Qty: 90 TABLET | Refills: 0 | Status: SHIPPED | OUTPATIENT
Start: 2022-08-01 | End: 2022-08-30 | Stop reason: SDUPTHER

## 2022-08-02 ENCOUNTER — TELEPHONE (OUTPATIENT)
Dept: INTERNAL MEDICINE CLINIC | Facility: CLINIC | Age: 87
End: 2022-08-02

## 2022-08-02 NOTE — TELEPHONE ENCOUNTER
olvin called and said this morning around 8am her heart was racing  It lasted 5-10 minutes and stopped  She took her medication after  She feels fine since it happened    Please advise if she needs an eval     [phone]

## 2022-08-02 NOTE — TELEPHONE ENCOUNTER
Please let the patient know that if this occurs again she should go immediately to the emergency room  I do not see any open visits the rest of this week    Can you try to find a spot for her next week for an office visit with an electrocardiogram thank you

## 2022-08-08 ENCOUNTER — OFFICE VISIT (OUTPATIENT)
Dept: INTERNAL MEDICINE CLINIC | Facility: CLINIC | Age: 87
End: 2022-08-08
Payer: MEDICARE

## 2022-08-08 VITALS
WEIGHT: 170 LBS | OXYGEN SATURATION: 99 % | SYSTOLIC BLOOD PRESSURE: 122 MMHG | TEMPERATURE: 97 F | RESPIRATION RATE: 16 BRPM | BODY MASS INDEX: 29.02 KG/M2 | HEIGHT: 64 IN | DIASTOLIC BLOOD PRESSURE: 70 MMHG | HEART RATE: 97 BPM

## 2022-08-08 DIAGNOSIS — F41.9 ANXIETY: ICD-10-CM

## 2022-08-08 DIAGNOSIS — I10 PRIMARY HYPERTENSION: ICD-10-CM

## 2022-08-08 DIAGNOSIS — R00.2 PALPITATIONS: Primary | ICD-10-CM

## 2022-08-08 PROCEDURE — 93000 ELECTROCARDIOGRAM COMPLETE: CPT | Performed by: INTERNAL MEDICINE

## 2022-08-08 PROCEDURE — 99214 OFFICE O/P EST MOD 30 MIN: CPT | Performed by: INTERNAL MEDICINE

## 2022-08-08 RX ORDER — BRINZOLAMIDE/BRIMONIDINE TARTRATE 10; 2 MG/ML; MG/ML
SUSPENSION/ DROPS OPHTHALMIC
COMMUNITY
Start: 2022-07-27

## 2022-08-08 NOTE — ASSESSMENT & PLAN NOTE
Chronic anxiety symptoms may be a factor in the patient's palpitations recommend continuation of alprazolam 0 25 mg 1 tablet in the morning and 2 at bedtime

## 2022-08-08 NOTE — PROGRESS NOTES
Assessment/Plan:    Hypertension  Blood pressure assessment today indicates continued good control blood pressure reading 122/70 recommend continuation of lisinopril at 40 mg daily and amlodipine 5 mg daily    Anxiety  Chronic anxiety symptoms may be a factor in the patient's palpitations recommend continuation of alprazolam 0 25 mg 1 tablet in the morning and 2 at bedtime    Palpitations  Palpitations symptoms which are new in recent in onset  Seem to be self-limiting described by the patient is a rapid heart rate no associated lightheadedness or dizziness no associated shortness of breath or chest discomfort  Electrocardiogram today shows sinus rhythm with nonspecific changes  Recommendation for further workup includes a 24 hour Holter monitor, echocardiogram, of metabolic profile magnesium level and thyroid screening  Will review the studies with the patient upon their completion  Indicated to the patient that if she has extended episode of these palpitations to go to the emergency room immediately for further evaluation  Diagnoses and all orders for this visit:    Palpitations  -     POCT ECG  -     Holter monitor; Future  -     Echo complete w/ contrast if indicated; Future  -     Comprehensive metabolic panel; Future  -     Magnesium; Future  -     TSH, 3rd generation with Free T4 reflex; Future    Primary hypertension    Anxiety    Other orders  -     Simbrinza 1-0 2 % SUSP        Subjective:      Patient ID: Shannan Canela is a 82 y o  female  This 53-year-old female patient presents today for evaluation of several episodes of racing heart symptoms with palpitations and a sensation that her heart is racing  Of the 1st episode occurred last week after getting out of the shower and lasted for several minutes and then self terminated  She has had several other brief episodes also self limited    She has no prior history of palpitations nor does she have any associated shortness of breath or chest discomfort with the palpitations symptoms  She did not feel lightheaded or like she was going to pass out with the symptoms  The following portions of the patient's history were reviewed and updated as appropriate:   She  has a past medical history of Epistaxis and Saphenous vein thrombophlebitis, right (2012)  She   Patient Active Problem List    Diagnosis Date Noted    Palpitations 08/08/2022    COVID 07/05/2022    Primary osteoarthritis of both knees 01/26/2022    Adjustment insomnia 10/04/2021    Rotator cuff tear arthropathy of right shoulder 06/10/2021    Internal derangement of shoulder, right 05/17/2021    Arthritis 12/23/2020    Chronic left shoulder pain 11/05/2020    Other fatigue 05/08/2020    Nail fungus 01/17/2020    Varicose veins of both lower extremities 09/30/2019    Allergy 06/12/2019    Depression 02/28/2019    Bunion of great toe 02/28/2019    Hammertoe of right foot 02/28/2019    Hyperlipidemia 10/31/2018    Glucose intolerance 10/31/2018    Flatulence 10/31/2018    Anxiety 10/09/2014    Fibromyalgia 10/09/2014    Hypertension 10/09/2014     She  has a past surgical history that includes Gallbladder surgery (2006)  Her family history includes Hypertension in her mother  She  reports that she has never smoked  She has never used smokeless tobacco  She reports previous alcohol use  She reports that she does not use drugs    Current Outpatient Medications   Medication Sig Dispense Refill    ALPRAZolam (XANAX) 0 25 mg tablet Take 1 pill in the am and 2 pills at bedtime 90 tablet 0    amLODIPine (NORVASC) 2 5 mg tablet TAKE 2 TABLETS BY MOUTH EVERY  tablet 1    aspirin 81 MG tablet Take 1 tablet by mouth daily 3 times a week      cholecalciferol (VITAMIN D3) 1,000 units tablet Take by mouth      ciclopirox (PENLAC) 8 % solution Apply topically daily at bedtime 6 6 mL 2    cycloSPORINE (RESTASIS) 0 05 % ophthalmic emulsion Apply to eye      ergocalciferol (VITAMIN D2) 50,000 units TAKE 1 CAPSULE BY MOUTH ONE TIME PER WEEK      famotidine (PEPCID) 20 mg tablet Take 1 tablet by mouth daily      fluconazole (DIFLUCAN) 150 mg tablet       fluticasone (FLONASE) 50 mcg/act nasal spray SPRAY 1 SPRAY INTO EACH NOSTRIL TWICE A DAY 48 mL 3    guaiFENesin (ROBITUSSIN) 100 MG/5ML oral liquid Take 200 mg by mouth 3 (three) times a day as needed for cough      latanoprost (XALATAN) 0 005 % ophthalmic solution ADMINISTER 1 DROP IN EACH EYE AT BEDTIME      lisinopril (ZESTRIL) 40 mg tablet TAKE 1 TABLET BY MOUTH EVERY DAY 90 tablet 3    loratadine (CLARITIN) 10 mg tablet Take 1 tablet by mouth daily      Lumigan 0 01 % ophthalmic drops       metroNIDAZOLE (FLAGYL) 500 mg tablet       PREMARIN vaginal cream APPLY 1/4 INCH WITH FINGER TO VAGINA MONDAY AND FRIDAY AT BEDTIME  2    Simbrinza 1-0 2 % SUSP       timolol (Betimol) 0 5 % ophthalmic solution Timolol Maleate 0 5 % Ophthalmic Solution  INSTILL DROP Once    Refills: 0       Active      timolol (TIMOPTIC) 0 5 % ophthalmic solution INSTILL 1 DROP INTO RIGHT EYE EVERY DAY  2    ZIOPTAN 0 0015 % ophthalmic solution ADMINISTER 1 DROP OPHTHALMICALLY AT BEDTIME IN THE RIGHT EYE, BRAND NECESSARY  6     No current facility-administered medications for this visit       Review of Systems   Cardiovascular: Positive for palpitations  Psychiatric/Behavioral: The patient is nervous/anxious  Objective:      /70   Pulse 97   Temp (!) 97 °F (36 1 °C)   Resp 16   Ht 5' 4" (1 626 m)   Wt 77 1 kg (170 lb)   SpO2 99%   BMI 29 18 kg/m²          Physical Exam  Vitals reviewed  Constitutional:       General: She is not in acute distress  Appearance: Normal appearance  She is well-developed  She is not ill-appearing     HENT:      Right Ear: Hearing, tympanic membrane, ear canal and external ear normal       Left Ear: Hearing, tympanic membrane, ear canal and external ear normal       Nose: Nose normal  No mucosal edema  Mouth/Throat:      Mouth: Mucous membranes are moist       Pharynx: Oropharynx is clear  Uvula midline  Eyes:      General: Lids are normal       Conjunctiva/sclera: Conjunctivae normal       Pupils: Pupils are equal, round, and reactive to light  Neck:      Thyroid: No thyromegaly  Vascular: No carotid bruit or JVD  Cardiovascular:      Rate and Rhythm: Normal rate and regular rhythm  Heart sounds: Normal heart sounds  No murmur heard  Pulmonary:      Effort: Pulmonary effort is normal  No respiratory distress  Breath sounds: Normal breath sounds  No wheezing, rhonchi or rales  Abdominal:      General: Bowel sounds are normal       Palpations: Abdomen is soft  Musculoskeletal:         General: Normal range of motion  Right lower leg: No edema  Left lower leg: No edema  Lymphadenopathy:      Cervical: No cervical adenopathy  Skin:     General: Skin is warm and dry  Neurological:      Mental Status: She is alert and oriented to person, place, and time  Deep Tendon Reflexes: Reflexes are normal and symmetric  Reflexes normal    Psychiatric:         Speech: Speech normal          Behavior: Behavior normal  Behavior is cooperative  Thought Content:  Thought content normal          Judgment: Judgment normal

## 2022-08-08 NOTE — ASSESSMENT & PLAN NOTE
Palpitations symptoms which are new in recent in onset  Seem to be self-limiting described by the patient is a rapid heart rate no associated lightheadedness or dizziness no associated shortness of breath or chest discomfort  Electrocardiogram today shows sinus rhythm with nonspecific changes  Recommendation for further workup includes a 24 hour Holter monitor, echocardiogram, of metabolic profile magnesium level and thyroid screening  Will review the studies with the patient upon their completion  Indicated to the patient that if she has extended episode of these palpitations to go to the emergency room immediately for further evaluation

## 2022-08-08 NOTE — ASSESSMENT & PLAN NOTE
Blood pressure assessment today indicates continued good control blood pressure reading 122/70 recommend continuation of lisinopril at 40 mg daily and amlodipine 5 mg daily

## 2022-08-10 ENCOUNTER — HOSPITAL ENCOUNTER (OUTPATIENT)
Dept: NON INVASIVE DIAGNOSTICS | Facility: CLINIC | Age: 87
Discharge: HOME | End: 2022-08-10
Payer: MEDICARE

## 2022-08-10 VITALS
HEART RATE: 70 BPM | BODY MASS INDEX: 29.02 KG/M2 | WEIGHT: 170 LBS | HEIGHT: 64 IN | SYSTOLIC BLOOD PRESSURE: 122 MMHG | DIASTOLIC BLOOD PRESSURE: 70 MMHG

## 2022-08-10 DIAGNOSIS — R00.2 PALPITATIONS: ICD-10-CM

## 2022-08-10 LAB
AORTIC ROOT: 3 CM
APICAL FOUR CHAMBER EJECTION FRACTION: 63 %
ASCENDING AORTA: 3.6 CM
E WAVE DECELERATION TIME: 271 MS
FRACTIONAL SHORTENING: 31 % (ref 28–44)
INTERVENTRICULAR SEPTUM IN DIASTOLE (PARASTERNAL SHORT AXIS VIEW): 1.4 CM
INTERVENTRICULAR SEPTUM: 1.4 CM (ref 0.6–1.1)
LAAS-AP2: 14.8 CM2
LAAS-AP4: 17.9 CM2
LEFT ATRIUM SIZE: 3.1 CM
LEFT INTERNAL DIMENSION IN SYSTOLE: 2.5 CM (ref 2.1–4)
LEFT VENTRICULAR INTERNAL DIMENSION IN DIASTOLE: 3.6 CM (ref 3.5–6)
LEFT VENTRICULAR POSTERIOR WALL IN END DIASTOLE: 1 CM
LEFT VENTRICULAR STROKE VOLUME: 33 ML
LVSV (TEICH): 33 ML
MV E'TISSUE VEL-SEP: 5 CM/S
MV PEAK A VEL: 0.93 M/S
MV PEAK E VEL: 57 CM/S
MV STENOSIS PRESSURE HALF TIME: 79 MS
MV VALVE AREA P 1/2 METHOD: 2.78 CM2
RIGHT ATRIUM AREA SYSTOLE A4C: 15.1 CM2
RIGHT VENTRICLE ID DIMENSION: 3.3 CM
SL CV LEFT ATRIUM LENGTH A2C: 5.2 CM
SL CV LV EF: 60
SL CV PED ECHO LEFT VENTRICLE DIASTOLIC VOLUME (MOD BIPLANE) 2D: 55 ML
SL CV PED ECHO LEFT VENTRICLE SYSTOLIC VOLUME (MOD BIPLANE) 2D: 22 ML

## 2022-08-10 PROCEDURE — 93226 XTRNL ECG REC<48 HR SCAN A/R: CPT

## 2022-08-10 PROCEDURE — 93306 TTE W/DOPPLER COMPLETE: CPT | Performed by: INTERNAL MEDICINE

## 2022-08-10 PROCEDURE — 93306 TTE W/DOPPLER COMPLETE: CPT

## 2022-08-10 PROCEDURE — 93225 XTRNL ECG REC<48 HRS REC: CPT

## 2022-08-12 PROCEDURE — 93227 XTRNL ECG REC<48 HR R&I: CPT | Performed by: INTERNAL MEDICINE

## 2022-08-16 ENCOUNTER — APPOINTMENT (OUTPATIENT)
Dept: LAB | Age: 87
End: 2022-08-16
Payer: MEDICARE

## 2022-08-16 DIAGNOSIS — R00.2 PALPITATIONS: ICD-10-CM

## 2022-08-16 LAB
ALBUMIN SERPL BCP-MCNC: 3.5 G/DL (ref 3.5–5)
ALP SERPL-CCNC: 95 U/L (ref 46–116)
ALT SERPL W P-5'-P-CCNC: 19 U/L (ref 12–78)
ANION GAP SERPL CALCULATED.3IONS-SCNC: 4 MMOL/L (ref 4–13)
AST SERPL W P-5'-P-CCNC: 15 U/L (ref 5–45)
BILIRUB SERPL-MCNC: 0.44 MG/DL (ref 0.2–1)
BUN SERPL-MCNC: 24 MG/DL (ref 5–25)
CALCIUM SERPL-MCNC: 9.9 MG/DL (ref 8.3–10.1)
CHLORIDE SERPL-SCNC: 106 MMOL/L (ref 96–108)
CO2 SERPL-SCNC: 27 MMOL/L (ref 21–32)
CREAT SERPL-MCNC: 0.79 MG/DL (ref 0.6–1.3)
GFR SERPL CREATININE-BSD FRML MDRD: 68 ML/MIN/1.73SQ M
GLUCOSE P FAST SERPL-MCNC: 102 MG/DL (ref 65–99)
MAGNESIUM SERPL-MCNC: 2.2 MG/DL (ref 1.6–2.6)
POTASSIUM SERPL-SCNC: 3.9 MMOL/L (ref 3.5–5.3)
PROT SERPL-MCNC: 7.1 G/DL (ref 6.4–8.4)
SODIUM SERPL-SCNC: 137 MMOL/L (ref 135–147)
TSH SERPL DL<=0.05 MIU/L-ACNC: 2.43 UIU/ML (ref 0.45–4.5)

## 2022-08-16 PROCEDURE — 83735 ASSAY OF MAGNESIUM: CPT

## 2022-08-16 PROCEDURE — 80053 COMPREHEN METABOLIC PANEL: CPT

## 2022-08-16 PROCEDURE — 36415 COLL VENOUS BLD VENIPUNCTURE: CPT

## 2022-08-16 PROCEDURE — 84443 ASSAY THYROID STIM HORMONE: CPT

## 2022-08-18 ENCOUNTER — OFFICE VISIT (OUTPATIENT)
Dept: INTERNAL MEDICINE CLINIC | Facility: CLINIC | Age: 87
End: 2022-08-18
Payer: MEDICARE

## 2022-08-18 VITALS
HEIGHT: 64 IN | WEIGHT: 171.4 LBS | HEART RATE: 95 BPM | SYSTOLIC BLOOD PRESSURE: 128 MMHG | BODY MASS INDEX: 29.26 KG/M2 | TEMPERATURE: 98 F | OXYGEN SATURATION: 99 % | DIASTOLIC BLOOD PRESSURE: 70 MMHG

## 2022-08-18 DIAGNOSIS — F41.9 ANXIETY: ICD-10-CM

## 2022-08-18 DIAGNOSIS — I37.1 NONRHEUMATIC PULMONARY VALVE INSUFFICIENCY: ICD-10-CM

## 2022-08-18 DIAGNOSIS — I36.1 NONRHEUMATIC TRICUSPID VALVE REGURGITATION: ICD-10-CM

## 2022-08-18 DIAGNOSIS — R00.2 PALPITATIONS: Primary | ICD-10-CM

## 2022-08-18 DIAGNOSIS — I35.1 NONRHEUMATIC AORTIC VALVE INSUFFICIENCY: ICD-10-CM

## 2022-08-18 DIAGNOSIS — I10 PRIMARY HYPERTENSION: ICD-10-CM

## 2022-08-18 PROCEDURE — 99215 OFFICE O/P EST HI 40 MIN: CPT | Performed by: INTERNAL MEDICINE

## 2022-08-18 NOTE — ASSESSMENT & PLAN NOTE
Echocardiogram reviewed with patient shows trace pulmonary valve review regurgitation patient asymptomatic recommend continued observation

## 2022-08-18 NOTE — ASSESSMENT & PLAN NOTE
Ongoing anxiety symptoms reviewed with the patient today I recommend continuation of alprazolam 0 25 mg 1 pill in the morning and 2 at bedtime

## 2022-08-18 NOTE — PROGRESS NOTES
Assessment/Plan:    Nonrheumatic tricuspid valve regurgitation  Echocardiogram confirms the presence of trace tricuspid valve regurgitation of patient seems asymptomatic from this of recommend surveillance    Nonrheumatic pulmonary valve insufficiency  Echocardiogram reviewed with patient shows trace pulmonary valve review regurgitation patient asymptomatic recommend continued observation    Nonrheumatic aortic valve insufficiency  Echocardiogram reviewed with patient shows trace aortic valve regurgitation patient asymptomatic recommend continued surveillance    Hypertension  Hypertension assessment today confirms good blood pressure control recommend continuation of lisinopril at 40 mg daily and amlodipine at 5 mg daily  Palpitations  She review of the patient's Holter monitor and an echocardiogram was performed today  Echocardiogram shows trace regurgitation of aortic tricuspid and pulmonic valves doubt that this is a factor in her palpitations symptoms  There is no enlargement of the upper chambers nor any enlargement of the lower chambers  Patient is Holter monitor was reviewed of the palpitations symptoms that she notes on her diary seem to correspond with either a sinus tachycardia or occasional premature beats  The incidence of these premature atrial and ventricular beats is extremely low with no complexity  A do not recommend any additional treatment at this time of believe that the patient's of palpitations symptoms are most likely related to anxiety and stress  She has been going through a difficult time trying to sell her home and move into a senior center      Anxiety  Ongoing anxiety symptoms reviewed with the patient today I recommend continuation of alprazolam 0 25 mg 1 pill in the morning and 2 at bedtime       Diagnoses and all orders for this visit:    Palpitations    Anxiety    Primary hypertension    Nonrheumatic aortic valve insufficiency    Nonrheumatic tricuspid valve regurgitation    Nonrheumatic pulmonary valve insufficiency        Subjective:      Patient ID: Meryl Rose is a 82 y o  female  This 82-year-old female patient returns today for follow-up visit  Of her last visit with us she presented with symptoms of palpitation  In response to the symptoms we requested an echocardiogram as well as a Holter monitor  Of on today's visit with the patient we have reviewed the results of the studies  The following portions of the patient's history were reviewed and updated as appropriate:   She  has a past medical history of Epistaxis and Saphenous vein thrombophlebitis, right (2012)  She   Patient Active Problem List    Diagnosis Date Noted    Nonrheumatic aortic valve insufficiency 08/18/2022    Nonrheumatic tricuspid valve regurgitation 08/18/2022    Nonrheumatic pulmonary valve insufficiency 08/18/2022    Palpitations 08/08/2022    COVID 07/05/2022    Primary osteoarthritis of both knees 01/26/2022    Adjustment insomnia 10/04/2021    Rotator cuff tear arthropathy of right shoulder 06/10/2021    Internal derangement of shoulder, right 05/17/2021    Arthritis 12/23/2020    Chronic left shoulder pain 11/05/2020    Other fatigue 05/08/2020    Nail fungus 01/17/2020    Varicose veins of both lower extremities 09/30/2019    Allergy 06/12/2019    Depression 02/28/2019    Bunion of great toe 02/28/2019    Hammertoe of right foot 02/28/2019    Hyperlipidemia 10/31/2018    Glucose intolerance 10/31/2018    Flatulence 10/31/2018    Anxiety 10/09/2014    Fibromyalgia 10/09/2014    Hypertension 10/09/2014     She  has a past surgical history that includes Gallbladder surgery (2006)  Her family history includes Hypertension in her mother  She  reports that she has never smoked  She has never used smokeless tobacco  She reports previous alcohol use  She reports that she does not use drugs    Current Outpatient Medications   Medication Sig Dispense Refill    ALPRAZolam (XANAX) 0 25 mg tablet Take 1 pill in the am and 2 pills at bedtime 90 tablet 0    amLODIPine (NORVASC) 2 5 mg tablet TAKE 2 TABLETS BY MOUTH EVERY  tablet 1    aspirin 81 MG tablet Take 1 tablet by mouth daily 3 times a week      cholecalciferol (VITAMIN D3) 1,000 units tablet Take by mouth      ciclopirox (PENLAC) 8 % solution Apply topically daily at bedtime 6 6 mL 2    cycloSPORINE (RESTASIS) 0 05 % ophthalmic emulsion Apply to eye      ergocalciferol (VITAMIN D2) 50,000 units TAKE 1 CAPSULE BY MOUTH ONE TIME PER WEEK      famotidine (PEPCID) 20 mg tablet Take 1 tablet by mouth daily      fluconazole (DIFLUCAN) 150 mg tablet       fluticasone (FLONASE) 50 mcg/act nasal spray SPRAY 1 SPRAY INTO EACH NOSTRIL TWICE A DAY 48 mL 3    guaiFENesin (ROBITUSSIN) 100 MG/5ML oral liquid Take 200 mg by mouth 3 (three) times a day as needed for cough      latanoprost (XALATAN) 0 005 % ophthalmic solution ADMINISTER 1 DROP IN EACH EYE AT BEDTIME      lisinopril (ZESTRIL) 40 mg tablet TAKE 1 TABLET BY MOUTH EVERY DAY 90 tablet 3    loratadine (CLARITIN) 10 mg tablet Take 1 tablet by mouth daily      Lumigan 0 01 % ophthalmic drops       metroNIDAZOLE (FLAGYL) 500 mg tablet       PREMARIN vaginal cream APPLY 1/4 INCH WITH FINGER TO VAGINA MONDAY AND FRIDAY AT BEDTIME  2    Simbrinza 1-0 2 % SUSP       timolol (Betimol) 0 5 % ophthalmic solution Timolol Maleate 0 5 % Ophthalmic Solution  INSTILL DROP Once    Refills: 0       Active      timolol (TIMOPTIC) 0 5 % ophthalmic solution INSTILL 1 DROP INTO RIGHT EYE EVERY DAY  2    ZIOPTAN 0 0015 % ophthalmic solution ADMINISTER 1 DROP OPHTHALMICALLY AT BEDTIME IN THE RIGHT EYE, BRAND NECESSARY  6     No current facility-administered medications for this visit       Review of Systems   Cardiovascular: Positive for palpitations  Psychiatric/Behavioral: The patient is nervous/anxious  All other systems reviewed and are negative  Objective:      /70   Pulse 95   Temp 98 °F (36 7 °C)   Ht 5' 4" (1 626 m)   Wt 77 7 kg (171 lb 6 4 oz)   SpO2 99%   BMI 29 42 kg/m²          Physical Exam  Vitals reviewed  Constitutional:       Appearance: Normal appearance  She is well-developed  She is not ill-appearing  HENT:      Right Ear: Hearing and external ear normal       Left Ear: Hearing and external ear normal       Nose: Nose normal  No mucosal edema  Mouth/Throat:      Pharynx: Uvula midline  Eyes:      General: Lids are normal       Conjunctiva/sclera: Conjunctivae normal       Pupils: Pupils are equal, round, and reactive to light  Neck:      Thyroid: No thyromegaly  Vascular: No carotid bruit or JVD  Cardiovascular:      Rate and Rhythm: Normal rate and regular rhythm  Heart sounds: Normal heart sounds  No murmur heard  Pulmonary:      Effort: Pulmonary effort is normal  No respiratory distress  Breath sounds: Normal breath sounds  No wheezing, rhonchi or rales  Abdominal:      General: Bowel sounds are normal       Palpations: Abdomen is soft  Musculoskeletal:         General: Normal range of motion  Lymphadenopathy:      Cervical: No cervical adenopathy  Skin:     General: Skin is warm and dry  Neurological:      Mental Status: She is alert and oriented to person, place, and time  Deep Tendon Reflexes: Reflexes are normal and symmetric  Psychiatric:         Speech: Speech normal          Behavior: Behavior normal  Behavior is cooperative  Thought Content:  Thought content normal          Judgment: Judgment normal

## 2022-08-18 NOTE — ASSESSMENT & PLAN NOTE
Echocardiogram confirms the presence of trace tricuspid valve regurgitation of patient seems asymptomatic from this of recommend surveillance

## 2022-08-18 NOTE — ASSESSMENT & PLAN NOTE
She review of the patient's Holter monitor and an echocardiogram was performed today  Echocardiogram shows trace regurgitation of aortic tricuspid and pulmonic valves doubt that this is a factor in her palpitations symptoms  There is no enlargement of the upper chambers nor any enlargement of the lower chambers  Patient is Holter monitor was reviewed of the palpitations symptoms that she notes on her diary seem to correspond with either a sinus tachycardia or occasional premature beats  The incidence of these premature atrial and ventricular beats is extremely low with no complexity  A do not recommend any additional treatment at this time of believe that the patient's of palpitations symptoms are most likely related to anxiety and stress  She has been going through a difficult time trying to sell her home and move into a senior center

## 2022-08-18 NOTE — ASSESSMENT & PLAN NOTE
Echocardiogram reviewed with patient shows trace aortic valve regurgitation patient asymptomatic recommend continued surveillance

## 2022-08-30 DIAGNOSIS — F41.9 ANXIETY: ICD-10-CM

## 2022-08-30 RX ORDER — ALPRAZOLAM 0.25 MG/1
TABLET ORAL
Qty: 90 TABLET | Refills: 0 | Status: SHIPPED | OUTPATIENT
Start: 2022-08-30 | End: 2022-09-30 | Stop reason: SDUPTHER

## 2022-09-21 ENCOUNTER — TELEPHONE (OUTPATIENT)
Dept: INTERNAL MEDICINE CLINIC | Facility: CLINIC | Age: 87
End: 2022-09-21

## 2022-09-21 DIAGNOSIS — M25.511 ACUTE PAIN OF RIGHT SHOULDER: Primary | ICD-10-CM

## 2022-09-21 NOTE — PROGRESS NOTES
Patient having some increase in pain in right shoulder no history of recent trauma  She did have a history of muscle tear in that shoulder as well as dislocation  Of last x-rays of the shoulder were 2 years ago of asked her to have an updated x-ray evaluation  Patient is going to have a cataract surgery in the near future will be coming in for medical clearance prior to her right side cataract extraction

## 2022-09-21 NOTE — TELEPHONE ENCOUNTER
Patient called and is having shoulder issues  She said it is her right shoulder  She is having eye issues  She fell about a year ago and the last couple of weeks it is bothering her  She can be reached at [phone]    Thank you

## 2022-09-28 ENCOUNTER — APPOINTMENT (OUTPATIENT)
Dept: RADIOLOGY | Age: 87
End: 2022-09-28
Payer: MEDICARE

## 2022-09-28 DIAGNOSIS — M25.511 ACUTE PAIN OF RIGHT SHOULDER: ICD-10-CM

## 2022-09-28 PROCEDURE — 73030 X-RAY EXAM OF SHOULDER: CPT

## 2022-09-30 DIAGNOSIS — F41.9 ANXIETY: ICD-10-CM

## 2022-09-30 RX ORDER — ALPRAZOLAM 0.25 MG/1
TABLET ORAL
Qty: 90 TABLET | Refills: 0 | Status: SHIPPED | OUTPATIENT
Start: 2022-09-30

## 2022-10-03 ENCOUNTER — OFFICE VISIT (OUTPATIENT)
Dept: INTERNAL MEDICINE CLINIC | Facility: CLINIC | Age: 87
End: 2022-10-03
Payer: MEDICARE

## 2022-10-03 VITALS
SYSTOLIC BLOOD PRESSURE: 124 MMHG | OXYGEN SATURATION: 96 % | BODY MASS INDEX: 29.4 KG/M2 | DIASTOLIC BLOOD PRESSURE: 70 MMHG | WEIGHT: 172.2 LBS | HEART RATE: 105 BPM | TEMPERATURE: 97.1 F | HEIGHT: 64 IN

## 2022-10-03 DIAGNOSIS — Z01.818 PREOP EXAMINATION: Primary | ICD-10-CM

## 2022-10-03 DIAGNOSIS — M24.811 INTERNAL DERANGEMENT OF SHOULDER, RIGHT: ICD-10-CM

## 2022-10-03 DIAGNOSIS — I10 PRIMARY HYPERTENSION: ICD-10-CM

## 2022-10-03 DIAGNOSIS — M17.0 PRIMARY OSTEOARTHRITIS OF BOTH KNEES: ICD-10-CM

## 2022-10-03 PROCEDURE — 99214 OFFICE O/P EST MOD 30 MIN: CPT | Performed by: INTERNAL MEDICINE

## 2022-10-03 RX ORDER — PREDNISOLONE ACETATE 10 MG/ML
SUSPENSION/ DROPS OPHTHALMIC
COMMUNITY
Start: 2022-09-15

## 2022-10-03 RX ORDER — KETOROLAC TROMETHAMINE 5 MG/ML
SOLUTION OPHTHALMIC
COMMUNITY
Start: 2022-09-15

## 2022-10-03 RX ORDER — CYCLOPENTOLATE HYDROCHLORIDE 10 MG/ML
SOLUTION/ DROPS OPHTHALMIC
COMMUNITY
Start: 2022-09-16

## 2022-10-03 RX ORDER — OFLOXACIN 3 MG/ML
SOLUTION/ DROPS OPHTHALMIC
COMMUNITY
Start: 2022-09-21

## 2022-10-03 NOTE — ASSESSMENT & PLAN NOTE
Advancing osteoarthritis symptoms of both knees recommend continued use of topical lidocaine    Follow-up with Orthopedics

## 2022-10-03 NOTE — ASSESSMENT & PLAN NOTE
I have reviewed with the patient x-ray studies of her right shoulder which confirms a flattening of the you [place] head consistent with either avascular necrosis or advancing arthritis  She will need to a Orthopedics consultation once she has completed her cataract surgery

## 2022-10-03 NOTE — ASSESSMENT & PLAN NOTE
Continue current dosing of alprazolam for anxiety symptoms no indication of impairment no abnormal use of the medication is noted

## 2022-10-03 NOTE — PROGRESS NOTES
Name: Nasim Vick      : 1935      MRN: [id number]  Encounter Provider: Justin Nowak MD  Encounter Date: 10/3/2022   Encounter department: [facility]    Assessment & Plan     1  Primary hypertension  Assessment & Plan:  Blood pressure assessment confirms good blood pressure control continue lisinopril at 40 mg daily and amlodipine at 5 mg daily      2  Primary osteoarthritis of both knees  Assessment & Plan:  Advancing osteoarthritis symptoms of both knees recommend continued use of topical lidocaine  Follow-up with Orthopedics      3  Internal derangement of shoulder, right  Assessment & Plan:  I have reviewed with the patient x-ray studies of her right shoulder which confirms a flattening of the you [place] head consistent with either avascular necrosis or advancing arthritis  She will need to a Orthopedics consultation once she has completed her cataract surgery  4  Preop examination  Assessment & Plan:  Preop examination for cataract surgery patient appears to be medically stable           Subjective      This 73-year-old female patient presents today for a medical clearance prior to cataract surgery surgery is planned for   Patient has had no recent cardiac symptoms of chest pain palpitations or shortness of breath nor any recent signs or symptoms of infection  In addition to medical clearance today we reviewed the patient's most recent x-ray of her right shoulder it does show a Flattening of the uterus head which is advanced since last imaging approximately 2 years ago  She will need further evaluation by orthopedics following completion of her cataract surgery  Patient continues to experience significant levels of anxiety from living alone and declining general health secondary to the aging process  She is considering moving out of her home into [facility] independent living      Review of Systems   Eyes:        Decreased visual acuity   Musculoskeletal: Positive for arthralgias  Right shoulder pain  Arthritic pain in both knees   Psychiatric/Behavioral: The patient is nervous/anxious  All other systems reviewed and are negative  Current Outpatient Medications on File Prior to Visit   Medication Sig    ALPRAZolam (XANAX) 0 25 mg tablet Take 1 pill in the am and 2 pills at bedtime    amLODIPine (NORVASC) 2 5 mg tablet TAKE 2 TABLETS BY MOUTH EVERY DAY    aspirin 81 MG tablet Take 1 tablet by mouth daily 3 times a week    cholecalciferol (VITAMIN D3) 1,000 units tablet Take by mouth    ciclopirox (PENLAC) 8 % solution Apply topically daily at bedtime    cyclopentolate (CYCLOGYL) 1 % ophthalmic solution INSTILL 1 DROP INTO RIGHT EYE THREE TIMES A DAY START AFTER SURGERY    cycloSPORINE (RESTASIS) 0 05 % ophthalmic emulsion Apply to eye    ergocalciferol (VITAMIN D2) 50,000 units TAKE 1 CAPSULE BY MOUTH ONE TIME PER WEEK    famotidine (PEPCID) 20 mg tablet Take 1 tablet by mouth daily    fluconazole (DIFLUCAN) 150 mg tablet     fluticasone (FLONASE) 50 mcg/act nasal spray SPRAY 1 SPRAY INTO EACH NOSTRIL TWICE A DAY    guaiFENesin (ROBITUSSIN) 100 MG/5ML oral liquid Take 200 mg by mouth 3 (three) times a day as needed for cough    ketorolac (ACULAR) 0 5 % ophthalmic solution INSTILL 1 DROP INTO BOTH EYES TWICE A DAY START 2 DAYS BEFORE SURGERY    latanoprost (XALATAN) 0 005 % ophthalmic solution ADMINISTER 1 DROP IN EACH EYE AT BEDTIME    lisinopril (ZESTRIL) 40 mg tablet TAKE 1 TABLET BY MOUTH EVERY DAY    loratadine (CLARITIN) 10 mg tablet Take 1 tablet by mouth daily    Lumigan 0 01 % ophthalmic drops     metroNIDAZOLE (FLAGYL) 500 mg tablet     ofloxacin (OCUFLOX) 0 3 % ophthalmic solution INSTILL 1 DROP INTO BOTH EYES FOUR TIMES A DAY   START DROPS 2 DAYS PRIOR TO SURGERY    prednisoLONE acetate (PRED FORTE) 1 % ophthalmic suspension INSTILL 1 DROP INTO RIGHT EYE FOUR TIMES A DAY RIGHT EYE ONLY, START 2 DAYS BEFORE SURGERY    PREMARIN vaginal cream APPLY 1/4 INCH WITH FINGER TO VAGINA MONDAY AND FRIDAY AT BEDTIME    Simbrinza 1-0 2 % SUSP     timolol (Betimol) 0 5 % ophthalmic solution Timolol Maleate 0 5 % Ophthalmic Solution  INSTILL DROP Once    Refills: 0       Active    timolol (TIMOPTIC) 0 5 % ophthalmic solution INSTILL 1 DROP INTO RIGHT EYE EVERY DAY    ZIOPTAN 0 0015 % ophthalmic solution ADMINISTER 1 DROP OPHTHALMICALLY AT BEDTIME IN THE RIGHT EYE, BRAND NECESSARY       Objective     /70   Pulse 105   Temp (!) 97 1 °F (36 2 °C)   Ht 5' 4" (1 626 m)   Wt 78 1 kg (172 lb 3 2 oz)   SpO2 96%   BMI 29 56 kg/m²     Physical Exam  Vitals reviewed  Constitutional:       General: She is not in acute distress  Appearance: Normal appearance  She is well-developed  She is not ill-appearing  HENT:      Head: Normocephalic  Right Ear: Hearing, tympanic membrane, ear canal and external ear normal       Left Ear: Hearing, tympanic membrane, ear canal and external ear normal       Nose: Nose normal  No mucosal edema  Mouth/Throat:      Pharynx: Uvula midline  Eyes:      General: Lids are normal       Conjunctiva/sclera: Conjunctivae normal       Pupils: Pupils are equal, round, and reactive to light  Neck:      Thyroid: No thyromegaly  Vascular: No carotid bruit or JVD  Cardiovascular:      Rate and Rhythm: Normal rate and regular rhythm  Heart sounds: Normal heart sounds  No murmur heard  Pulmonary:      Effort: Pulmonary effort is normal  No respiratory distress  Breath sounds: Normal breath sounds  Abdominal:      General: Bowel sounds are normal       Palpations: Abdomen is soft  Musculoskeletal:         General: Normal range of motion  Lymphadenopathy:      Cervical: No cervical adenopathy  Skin:     General: Skin is warm and dry  Neurological:      Mental Status: She is alert and oriented to person, place, and time        Deep Tendon Reflexes: Reflexes are normal and symmetric  Reflexes normal    Psychiatric:         Speech: Speech normal          Behavior: Behavior normal  Behavior is cooperative  Thought Content:  Thought content normal          Judgment: Judgment normal        Kimberley Pedraza MD

## 2022-10-03 NOTE — ASSESSMENT & PLAN NOTE
Blood pressure assessment confirms good blood pressure control continue lisinopril at 40 mg daily and amlodipine at 5 mg daily

## 2022-10-20 DIAGNOSIS — I10 ESSENTIAL HYPERTENSION: ICD-10-CM

## 2022-10-20 RX ORDER — LISINOPRIL 40 MG/1
TABLET ORAL
Qty: 90 TABLET | Refills: 3 | Status: SHIPPED | OUTPATIENT
Start: 2022-10-20

## 2022-11-02 DIAGNOSIS — I10 ESSENTIAL HYPERTENSION: ICD-10-CM

## 2022-11-02 RX ORDER — AMLODIPINE BESYLATE 2.5 MG/1
TABLET ORAL
Qty: 180 TABLET | Refills: 1 | Status: SHIPPED | OUTPATIENT
Start: 2022-11-02

## 2022-11-09 ENCOUNTER — OFFICE VISIT (OUTPATIENT)
Dept: INTERNAL MEDICINE CLINIC | Facility: CLINIC | Age: 87
End: 2022-11-09

## 2022-11-09 VITALS
TEMPERATURE: 96.8 F | HEART RATE: 105 BPM | BODY MASS INDEX: 28.99 KG/M2 | HEIGHT: 64 IN | OXYGEN SATURATION: 96 % | WEIGHT: 169.8 LBS

## 2022-11-09 DIAGNOSIS — I10 PRIMARY HYPERTENSION: Primary | ICD-10-CM

## 2022-11-09 DIAGNOSIS — M25.512 CHRONIC LEFT SHOULDER PAIN: ICD-10-CM

## 2022-11-09 DIAGNOSIS — Z23 NEED FOR IMMUNIZATION AGAINST INFLUENZA: ICD-10-CM

## 2022-11-09 DIAGNOSIS — F41.9 ANXIETY: ICD-10-CM

## 2022-11-09 DIAGNOSIS — G89.29 CHRONIC LEFT SHOULDER PAIN: ICD-10-CM

## 2022-11-09 DIAGNOSIS — M17.0 PRIMARY OSTEOARTHRITIS OF BOTH KNEES: ICD-10-CM

## 2022-11-09 DIAGNOSIS — R53.81 PHYSICAL DECONDITIONING: ICD-10-CM

## 2022-11-09 DIAGNOSIS — M79.7 FIBROMYALGIA: ICD-10-CM

## 2022-11-09 RX ORDER — POLYMYXIN B SULFATE AND TRIMETHOPRIM 1; 10000 MG/ML; [USP'U]/ML
SOLUTION OPHTHALMIC
COMMUNITY
Start: 2022-11-05

## 2022-11-09 RX ORDER — ALPRAZOLAM 0.25 MG/1
TABLET ORAL
Qty: 90 TABLET | Refills: 0 | Status: SHIPPED | OUTPATIENT
Start: 2022-11-09 | End: 2022-11-14 | Stop reason: SDUPTHER

## 2022-11-09 NOTE — ASSESSMENT & PLAN NOTE
Chronic left shoulder pain is been previously evaluated by Orthopedics at  [address] recommend physical therapy

## 2022-11-09 NOTE — ASSESSMENT & PLAN NOTE
Assessment of the arthritis in both of the patient's knees confirms the presence of primary osteoarthritis  This is been very difficult for the patient to adjust to and has a somewhat unsteady gait  When she arise at [facility] I have requested a physical therapy and occupational therapy assessment

## 2022-11-09 NOTE — ASSESSMENT & PLAN NOTE
Chronic muscular irritation and a connective tissue irritation remains present  Patient has a diagnosis of fibromyalgia

## 2022-11-09 NOTE — ASSESSMENT & PLAN NOTE
Patient has been experiencing an increase in anxiety type symptoms with upcoming move to a personal care  She has a supportive family and 1 of her daughters is with her today  Suspect this move his in the patient's best interest to take her out of a independent living situation and place her in assisted living is advisable    A renewal on the patient's alprazolam has been sent to her pharmacy today

## 2022-11-09 NOTE — ASSESSMENT & PLAN NOTE
Blood pressure assessment today continues to confirm adequate control continue lisinopril 40 mg daily and amlodipine 5 mg daily

## 2022-11-09 NOTE — PROGRESS NOTES
[facility]      : 1935      MRN: [id number]  Encounter Provider: Tamiko Tay MD  Encounter Date: 2022   Encounter department: [facility]    Assessment & Plan     1  Physical deconditioning  -     Ambulatory Referral to Physical Therapy; Future  -     Ambulatory Referral to Occupational Therapy; Future    2  Anxiety  Assessment & Plan:  Patient has been experiencing an increase in anxiety type symptoms with upcoming move to a personal care  She has a supportive family and 1 of her daughters is with her today  Suspect this move his in the patient's best interest to take her out of a independent living situation and place her in assisted living is advisable  A renewal on the patient's alprazolam has been sent to her pharmacy today    Orders:  -     ALPRAZolam (XANAX) 0 25 mg tablet; Take 1 pill in the am and 2 pills at bedtime    3  Need for immunization against influenza  -     influenza vaccine, high-dose, PF 0 7 mL (FLUZONE HIGH-DOSE)    4  Primary hypertension  Assessment & Plan:  Blood pressure assessment today continues to confirm adequate control continue lisinopril 40 mg daily and amlodipine 5 mg daily  5  Primary osteoarthritis of both knees  Assessment & Plan:  Assessment of the arthritis in both of the patient's knees confirms the presence of primary osteoarthritis  This is been very difficult for the patient to adjust to and has a somewhat unsteady gait  When she arise at [facility] I have requested a physical therapy and occupational therapy assessment  6  Fibromyalgia  Assessment & Plan:  Chronic muscular irritation and a connective tissue irritation remains present  Patient has a diagnosis of fibromyalgia        7  Chronic left shoulder pain  Assessment & Plan:  Chronic left shoulder pain is been previously evaluated by Orthopedics at [address] [place] [address] recommend physical therapy           Subjective      This 82year-old female patient presents today in the company of her daughter  She is here for a general physical exam and completion of admission paperwork for admission to [facility]  The patient has been living independently since her  passed away several years ago  Her overall physical condition has gradually declined over this period of time and due to visual impairment issue she is not operating a motor vehicle  She will be moving into [facility] in the next several days into a situation which should be much safer for her  She denies any recent chest pain palpitations or shortness of breath and has had no recent infections symptoms  She has recently undergone to surgical procedures on her right eye at the [facility] and has currently very limited vision in the right eye  Review of Systems   Eyes: Positive for visual disturbance  Musculoskeletal: Positive for arthralgias and gait problem  Due to arthritis of both knees the patient has a very unsteady gait she also has been experiencing ongoing issues with pain and limitation in range of motion of her left shoulder  Psychiatric/Behavioral: The patient is nervous/anxious  All other systems reviewed and are negative        Current Outpatient Medications on File Prior to Visit   Medication Sig   • amLODIPine (NORVASC) 2 5 mg tablet TAKE 2 TABLETS BY MOUTH EVERY DAY   • aspirin 81 MG tablet Take 1 tablet by mouth daily 3 times a week   • cholecalciferol (VITAMIN D3) 1,000 units tablet Take by mouth   • ciclopirox (PENLAC) 8 % solution Apply topically daily at bedtime   • cyclopentolate (CYCLOGYL) 1 % ophthalmic solution INSTILL 1 DROP INTO RIGHT EYE THREE TIMES A DAY START AFTER SURGERY   • cycloSPORINE (RESTASIS) 0 05 % ophthalmic emulsion Apply to eye   • ergocalciferol (VITAMIN D2) 50,000 units TAKE 1 CAPSULE BY MOUTH ONE TIME PER WEEK   • famotidine (PEPCID) 20 mg tablet Take 1 tablet by mouth daily   • fluconazole (DIFLUCAN) 150 mg tablet    • fluticasone (FLONASE) 50 mcg/act nasal spray SPRAY 1 SPRAY INTO EACH NOSTRIL TWICE A DAY   • guaiFENesin (ROBITUSSIN) 100 MG/5ML oral liquid Take 200 mg by mouth 3 (three) times a day as needed for cough   • lisinopril (ZESTRIL) 40 mg tablet TAKE 1 TABLET BY MOUTH EVERY DAY   • loratadine (CLARITIN) 10 mg tablet Take 1 tablet by mouth daily   • Lumigan 0 01 % ophthalmic drops    • metroNIDAZOLE (FLAGYL) 500 mg tablet    • polymyxin b-trimethoprim (POLYTRIM) ophthalmic solution    • prednisoLONE acetate (PRED FORTE) 1 % ophthalmic suspension INSTILL 1 DROP INTO RIGHT EYE FOUR TIMES A DAY RIGHT EYE ONLY, START 2 DAYS BEFORE SURGERY   • PREMARIN vaginal cream APPLY 1/4 INCH WITH FINGER TO VAGINA MONDAY AND FRIDAY AT BEDTIME   • Simbrinza 1-0 2 % SUSP    • [DISCONTINUED] ALPRAZolam (XANAX) 0 25 mg tablet Take 1 pill in the am and 2 pills at bedtime   • ketorolac (ACULAR) 0 5 % ophthalmic solution INSTILL 1 DROP INTO BOTH EYES TWICE A DAY START 2 DAYS BEFORE SURGERY (Patient not taking: Reported on 11/9/2022)   • latanoprost (XALATAN) 0 005 % ophthalmic solution ADMINISTER 1 DROP IN [facility] EYE AT BEDTIME (Patient not taking: Reported on 11/9/2022)   • ofloxacin (OCUFLOX) 0 3 % ophthalmic solution INSTILL 1 DROP INTO BOTH EYES FOUR TIMES A DAY   START DROPS 2 DAYS PRIOR TO SURGERY (Patient not taking: Reported on 11/9/2022)   • timolol (Betimol) 0 5 % ophthalmic solution Timolol Maleate 0 5 % Ophthalmic Solution  INSTILL DROP Once    Refills: 0       Active (Patient not taking: Reported on 11/9/2022)   • timolol (TIMOPTIC) 0 5 % ophthalmic solution INSTILL 1 DROP INTO RIGHT EYE EVERY DAY (Patient not taking: Reported on 11/9/2022)   • ZIOPTAN 0 0015 % ophthalmic solution ADMINISTER 1 DROP OPHTHALMICALLY AT BEDTIME IN THE RIGHT EYE, BRAND NECESSARY (Patient not taking: Reported on 11/9/2022)       Objective     Pulse 105   Temp (!) 96 8 °F (36 °C)   Ht 5' 4" (1 626 m)   Wt 77 kg (169 lb 12 8 oz)   SpO2 96%   BMI 29 15 kg/m²     Physical Exam  Vitals reviewed  Constitutional:       General: She is not in acute distress  Appearance: Normal appearance  She is well-developed  She is not ill-appearing  HENT:      Head: Normocephalic  Right Ear: Hearing, tympanic membrane, ear canal and external ear normal       Left Ear: Hearing, tympanic membrane, ear canal and external ear normal       Nose: Nose normal  No mucosal edema, congestion or rhinorrhea  Mouth/Throat:      Mouth: Mucous membranes are moist       Pharynx: Oropharynx is clear  Uvula midline  Eyes:      General: Lids are normal       Conjunctiva/sclera: Conjunctivae normal       Pupils: Pupils are equal, round, and reactive to light  Comments: Right eye shows significant the irritation and swelling she has recently undergone surgical procedure on this eye  Neck:      Thyroid: No thyromegaly  Vascular: No carotid bruit or JVD  Cardiovascular:      Rate and Rhythm: Normal rate and regular rhythm  Heart sounds: Normal heart sounds  No murmur heard  Pulmonary:      Effort: Pulmonary effort is normal  No respiratory distress  Breath sounds: Normal breath sounds  No wheezing or rhonchi  Abdominal:      General: Bowel sounds are normal       Palpations: Abdomen is soft  Musculoskeletal:         General: Normal range of motion  Cervical back: Normal range of motion and neck supple  No rigidity  Right lower leg: No edema  Left lower leg: No edema  Lymphadenopathy:      Cervical: No cervical adenopathy  Skin:     General: Skin is warm and dry  Coloration: Skin is not jaundiced or pale  Neurological:      Mental Status: She is alert and oriented to person, place, and time  Mental status is at baseline  Motor: Weakness present  Gait: Gait abnormal       Deep Tendon Reflexes: Reflexes are normal and symmetric   Reflexes normal    Psychiatric:         Speech: Speech normal  Behavior: Behavior normal  Behavior is cooperative  Thought Content:  Thought content normal          Judgment: Judgment normal        Kathia Drew MD

## 2022-11-10 ENCOUNTER — TELEPHONE (OUTPATIENT)
Dept: INTERNAL MEDICINE CLINIC | Facility: CLINIC | Age: 87
End: 2022-11-10

## 2022-11-12 ENCOUNTER — TELEPHONE (OUTPATIENT)
Dept: OTHER | Facility: OTHER | Age: 87
End: 2022-11-12

## 2022-11-14 DIAGNOSIS — T78.40XD ALLERGY, SUBSEQUENT ENCOUNTER: ICD-10-CM

## 2022-11-14 DIAGNOSIS — H33.20 RETINAL DETACHMENT, UNSPECIFIED LATERALITY: ICD-10-CM

## 2022-11-14 DIAGNOSIS — N95.2 VAGINAL ATROPHY: Primary | ICD-10-CM

## 2022-11-14 DIAGNOSIS — F41.9 ANXIETY: ICD-10-CM

## 2022-11-14 RX ORDER — GUAIFENESIN 100 MG/5ML
LIQUID ORAL
Qty: 473 ML | Refills: 5 | Status: SHIPPED | OUTPATIENT
Start: 2022-11-14

## 2022-11-14 RX ORDER — ALPRAZOLAM 0.25 MG/1
TABLET ORAL
Qty: 90 TABLET | Refills: 0 | Status: SHIPPED | OUTPATIENT
Start: 2022-12-09 | End: 2023-01-05

## 2022-11-14 RX ORDER — FLUTICASONE PROPIONATE 50 MCG
SPRAY, SUSPENSION (ML) NASAL
Qty: 16 G | Refills: 5 | Status: SHIPPED | OUTPATIENT
Start: 2022-11-14

## 2022-11-14 RX ORDER — CYCLOPENTOLATE HYDROCHLORIDE 10 MG/ML
SOLUTION/ DROPS OPHTHALMIC
Qty: 2 ML | Refills: 5 | Status: SHIPPED | OUTPATIENT
Start: 2022-11-14

## 2022-11-14 RX ORDER — CONJUGATED ESTROGENS 0.62 MG/G
CREAM VAGINAL
Qty: 30 G | Refills: 5 | Status: SHIPPED | OUTPATIENT
Start: 2022-11-14

## 2022-11-17 DIAGNOSIS — H04.129 DRY EYE: ICD-10-CM

## 2022-11-17 DIAGNOSIS — R52 PAIN: Primary | ICD-10-CM

## 2022-11-17 RX ORDER — CAPSAICIN, MENTHOL .025; 1.25 G/100G; G/100G
PATCH TOPICAL
Qty: 6 PATCH | Refills: 5 | Status: SHIPPED | OUTPATIENT
Start: 2022-11-17

## 2022-11-17 RX ORDER — ACETAMINOPHEN 500 MG
TABLET ORAL
Qty: 60 TABLET | Refills: 5 | Status: SHIPPED | OUTPATIENT
Start: 2022-11-17

## 2022-11-17 RX ORDER — GLYCERIN .002; .002; .01 MG/MG; MG/MG; MG/MG
SOLUTION/ DROPS OPHTHALMIC
Qty: 15 ML | Refills: 5 | Status: SHIPPED | OUTPATIENT
Start: 2022-11-17

## 2022-11-28 ENCOUNTER — TELEPHONE (OUTPATIENT)
Dept: INTERNAL MEDICINE CLINIC | Facility: CLINIC | Age: 87
End: 2022-11-28

## 2022-11-28 DIAGNOSIS — R39.9 UTI SYMPTOMS: Primary | ICD-10-CM

## 2022-11-28 NOTE — TELEPHONE ENCOUNTER
I am waiting for her to call back but will try her also in case she is still in the room  I told her I did not see any messages wither regarding this noted

## 2022-11-28 NOTE — TELEPHONE ENCOUNTER
For the past week ama has uti symptoms  She has a discharge, burning and pain in the area  The discharge is iritating  [facility] this and that her daughter called  She was using vagisil and got temporary relief  Please advise  [phone]  Does not have an answering machine at [facility]  Will call me back with the phone number so I can let her know what your plan is

## 2022-11-28 NOTE — TELEPHONE ENCOUNTER
Please have them run a urinalysis on the patient thank you  By the way do not remember seeing any communication from them regarding this matter last week

## 2022-11-28 NOTE — TELEPHONE ENCOUNTER
I spoke to [facility] at [phone] ext [phone] and they will need an order faxed to [phone] to do the test

## 2022-11-30 DIAGNOSIS — H04.129 DRY EYE: Primary | ICD-10-CM

## 2022-11-30 RX ORDER — CYCLOSPORINE 0.5 MG/ML
EMULSION OPHTHALMIC
Qty: 5.5 ML | Refills: 5 | Status: SHIPPED | OUTPATIENT
Start: 2022-11-30

## 2022-12-06 ENCOUNTER — TELEPHONE (OUTPATIENT)
Dept: INTERNAL MEDICINE CLINIC | Facility: CLINIC | Age: 87
End: 2022-12-06

## 2022-12-06 DIAGNOSIS — E55.9 VITAMIN D DEFICIENCY: Primary | ICD-10-CM

## 2022-12-06 DIAGNOSIS — Z20.828 EXPOSURE TO THE FLU: Primary | ICD-10-CM

## 2022-12-06 RX ORDER — ERGOCALCIFEROL 1.25 MG/1
CAPSULE ORAL
Qty: 1 CAPSULE | Refills: 5 | Status: SHIPPED | OUTPATIENT
Start: 2022-12-06

## 2022-12-06 RX ORDER — OSELTAMIVIR PHOSPHATE 75 MG/1
75 CAPSULE ORAL DAILY
Qty: 10 CAPSULE | Refills: 0 | Status: SHIPPED | OUTPATIENT
Start: 2022-12-06 | End: 2022-12-16

## 2022-12-06 NOTE — TELEPHONE ENCOUNTER
Bradly Giron from [facility] called  They are currently having a high flu outbreak  And other providers are ordering tamiflu as a preventative  They want to know if that's something you want to do as well?   If so, please fax to [phone]

## 2022-12-11 DIAGNOSIS — T78.40XD ALLERGY, SUBSEQUENT ENCOUNTER: ICD-10-CM

## 2022-12-11 RX ORDER — FLUTICASONE PROPIONATE 50 MCG
SPRAY, SUSPENSION (ML) NASAL
Qty: 48 ML | Refills: 3 | Status: SHIPPED | OUTPATIENT
Start: 2022-12-11

## 2022-12-21 DIAGNOSIS — R52 PAIN: ICD-10-CM

## 2022-12-21 DIAGNOSIS — K21.9 GASTROESOPHAGEAL REFLUX DISEASE WITHOUT ESOPHAGITIS: Primary | ICD-10-CM

## 2022-12-21 RX ORDER — CAPSAICIN, MENTHOL .025; 1.25 G/100G; G/100G
PATCH TOPICAL
Qty: 6 PATCH | Refills: 5 | Status: SHIPPED | OUTPATIENT
Start: 2022-12-21

## 2022-12-21 RX ORDER — FAMOTIDINE 20 MG/1
TABLET, FILM COATED ORAL
Qty: 30 TABLET | Refills: 5 | Status: SHIPPED | OUTPATIENT
Start: 2022-12-21

## 2022-12-30 DIAGNOSIS — T78.40XA ALLERGY, INITIAL ENCOUNTER: Primary | ICD-10-CM

## 2022-12-30 DIAGNOSIS — I10 ESSENTIAL HYPERTENSION: ICD-10-CM

## 2022-12-30 RX ORDER — LISINOPRIL 40 MG/1
TABLET ORAL
Qty: 30 TABLET | Refills: 5 | Status: SHIPPED | OUTPATIENT
Start: 2022-12-30

## 2023-01-02 RX ORDER — LORATADINE 10 MG/1
TABLET ORAL
Qty: 30 TABLET | Refills: 5 | Status: SHIPPED | OUTPATIENT
Start: 2023-01-02

## 2023-01-13 DIAGNOSIS — E55.9 VITAMIN D DEFICIENCY: ICD-10-CM

## 2023-01-13 RX ORDER — ERGOCALCIFEROL 1.25 MG/1
CAPSULE ORAL
Qty: 4 CAPSULE | Refills: 5 | Status: SHIPPED | OUTPATIENT
Start: 2023-01-13

## 2023-01-23 ENCOUNTER — TELEPHONE (OUTPATIENT)
Dept: INTERNAL MEDICINE CLINIC | Facility: CLINIC | Age: 88
End: 2023-01-23

## 2023-01-23 DIAGNOSIS — M19.90 ARTHRITIS: Primary | ICD-10-CM

## 2023-01-23 DIAGNOSIS — H04.129 DRY EYE: ICD-10-CM

## 2023-01-23 RX ORDER — LIDOCAINE 4 G/G
PATCH TOPICAL
Qty: 30 PATCH | Refills: 4 | Status: SHIPPED | OUTPATIENT
Start: 2023-01-23 | End: 2023-01-23

## 2023-01-23 RX ORDER — LIDOCAINE 4 G/G
PATCH TOPICAL
Qty: 30 PATCH | Refills: 4 | Status: SHIPPED | OUTPATIENT
Start: 2023-01-23 | End: 2023-07-18

## 2023-01-23 RX ORDER — GLYCERIN .002; .002; .01 MG/MG; MG/MG; MG/MG
1 SOLUTION/ DROPS OPHTHALMIC 2 TIMES DAILY
Qty: 15 ML | Refills: 5 | Status: SHIPPED | OUTPATIENT
Start: 2023-01-23

## 2023-01-23 RX ORDER — CARBOXYMETHYLCELLULOSE SODIUM 5 MG/ML
1 SOLUTION/ DROPS OPHTHALMIC 2 TIMES DAILY
Qty: 30 ML | Refills: 4 | Status: CANCELLED | OUTPATIENT
Start: 2023-01-23

## 2023-01-23 NOTE — TELEPHONE ENCOUNTER
Facility called and asked for salon pas be reordered and asked for a patch for 4 percent and not the gel  Apply in the morning and remove at night for shoulders and knees  They also asked for sodium chloride eye drops 5 percent and one drop a day for right eye twice daily  She said this goes to [facility]  She asked if you can please DC previous salon pas order  They will be contacted by the pharmacy then    Thank you
I left a message for the facility and they want a prescription ordered and have a question about a prescription and I asked if they can leave the name of the prescription 
Scripts were sent to the [address] thank you
not applicable

## 2023-02-20 DIAGNOSIS — I10 ESSENTIAL HYPERTENSION: ICD-10-CM

## 2023-02-20 RX ORDER — AMLODIPINE BESYLATE 2.5 MG/1
TABLET ORAL
Qty: 60 TABLET | Refills: 5 | Status: SHIPPED | OUTPATIENT
Start: 2023-02-20 | End: 2023-02-21 | Stop reason: SDUPTHER

## 2023-02-21 DIAGNOSIS — I10 ESSENTIAL HYPERTENSION: ICD-10-CM

## 2023-02-21 RX ORDER — AMLODIPINE BESYLATE 2.5 MG/1
5 TABLET ORAL DAILY
Qty: 60 TABLET | Refills: 5 | Status: SHIPPED | OUTPATIENT
Start: 2023-02-21

## 2023-05-02 NOTE — TELEPHONE ENCOUNTER
Discharge Note  Short Stay      SUMMARY     Admit Date: 5/2/2023    Attending Physician: Dony Bean MD        Discharge Physician: Dony Bean MD        Discharge Date: 5/2/2023 11:46 AM    Procedure(s) (LRB):  Bilateral L4/5 TF QING (Bilateral)    Final Diagnosis: Lumbar radiculopathy [M54.16]    Disposition: Home or self care    Patient Instructions:   Current Discharge Medication List        CONTINUE these medications which have NOT CHANGED    Details   nebivoloL (BYSTOLIC) 20 mg Tab Take 20 mg by mouth once daily.  Qty: 30 tablet, Refills: 6      SUBOXONE 12-3 mg Film Place 0.5 Film under the tongue 3 (three) times daily.      valsartan-hydrochlorothiazide (DIOVAN-HCT) 320-25 mg per tablet Take 1 tablet by mouth once daily.  Qty: 90 tablet, Refills: 3    Comments: .      albuterol (PROVENTIL/VENTOLIN HFA) 90 mcg/actuation inhaler Inhale 2 puffs into the lungs every 4 (four) hours as needed for Wheezing.  Qty: 18 g, Refills: 2      ferrous gluconate (FERGON) 324 MG tablet Take 1 tablet (324 mg total) by mouth 2 (two) times daily with meals.  Qty: 180 tablet, Refills: 5      fluticasone propionate (FLONASE) 50 mcg/actuation nasal spray 1 spray (50 mcg total) by Each Nostril route once daily.  Qty: 16 g, Refills: 3    Associated Diagnoses: Chronic nasal congestion      furosemide (LASIX) 40 MG tablet Take 1 tablet (40 mg total) by mouth daily as needed (for swelling). Take for 3 days, then as needed for swelling  Qty: 30 tablet, Refills: 11      levocetirizine (XYZAL) 5 MG tablet Take 1 tablet (5 mg total) by mouth every evening.  Qty: 30 tablet, Refills: 11      methocarbamoL (ROBAXIN) 500 MG Tab Take 1 tablet (500 mg total) by mouth 2 (two) times daily as needed (muscle spasms). May cause drowsiness.  Qty: 60 tablet, Refills: 0    Associated Diagnoses: Lumbar muscle pain      tamsulosin (FLOMAX) 0.4 mg Cap Take 1 capsule (0.4 mg total) by mouth once daily.  Qty: 30 capsule, Refills: 11        Patient may wean out as tolerated            Discharge Diagnosis: Lumbar radiculopathy [M54.16]  Condition on Discharge: Stable with no complications to procedure   Diet on Discharge: Same as before.  Activity: as per instruction sheet.  Discharge to: Home with a responsible adult.  Follow up: 2-4 weeks       Please call the office at [phone] if you experience any weakness or loss of sensation, fever > 101.5, pain uncontrolled with oral medications, persistent nausea/vomiting/or diarrhea, redness or drainage from the incisions, or any other worrisome concerns. If physician on call was not reached or could not communicate with our office for any reason please go to the nearest emergency department

## 2023-05-08 ENCOUNTER — TELEPHONE (OUTPATIENT)
Dept: OTHER | Facility: OTHER | Age: 88
End: 2023-05-08

## 2023-05-08 NOTE — TELEPHONE ENCOUNTER
Patient's daughter reports she has been trying to reach the office for over a week to make an appointment  Patient needs an appointment with her PCP to have paperwork filled out for her insurance  Her daughter would like a call back ASAP to advise

## 2023-05-09 ENCOUNTER — OFFICE VISIT (OUTPATIENT)
Dept: INTERNAL MEDICINE CLINIC | Facility: CLINIC | Age: 88
End: 2023-05-09

## 2023-05-09 VITALS
OXYGEN SATURATION: 96 % | WEIGHT: 174.2 LBS | SYSTOLIC BLOOD PRESSURE: 124 MMHG | BODY MASS INDEX: 29.74 KG/M2 | DIASTOLIC BLOOD PRESSURE: 78 MMHG | HEIGHT: 64 IN | HEART RATE: 87 BPM | TEMPERATURE: 97.6 F

## 2023-05-09 DIAGNOSIS — Z13.29 SCREENING FOR HYPOTHYROIDISM: ICD-10-CM

## 2023-05-09 DIAGNOSIS — F41.9 ANXIETY: ICD-10-CM

## 2023-05-09 DIAGNOSIS — Z13.0 SCREENING FOR DEFICIENCY ANEMIA: ICD-10-CM

## 2023-05-09 DIAGNOSIS — M17.0 PRIMARY OSTEOARTHRITIS OF BOTH KNEES: ICD-10-CM

## 2023-05-09 DIAGNOSIS — I10 PRIMARY HYPERTENSION: Primary | ICD-10-CM

## 2023-05-09 DIAGNOSIS — E74.39 GLUCOSE INTOLERANCE: ICD-10-CM

## 2023-05-09 PROBLEM — Z01.818 PREOP EXAMINATION: Status: RESOLVED | Noted: 2022-10-03 | Resolved: 2023-05-09

## 2023-05-09 RX ORDER — AMLODIPINE BESYLATE 5 MG/1
TABLET ORAL
COMMUNITY
Start: 2023-04-11

## 2023-05-09 NOTE — ASSESSMENT & PLAN NOTE
Ongoing limitation in range of motion of the right shoulder along with fairly persistent pain in the shoulder joint as well continue Tylenol for control of discomfort

## 2023-05-09 NOTE — ASSESSMENT & PLAN NOTE
History of mild glucose intolerance reviewed advised the patient to avoid saturated sugars and excessive carbohydrates  An updated fasting blood sugar has been requested

## 2023-05-09 NOTE — ASSESSMENT & PLAN NOTE
Assessment of hypertension on today's visit indicates good control recommend continuation of the patient's current hypertensive medications  A comprehensive metabolic profile to evaluate kidney performance and electrolyte balance has been requested today

## 2023-05-09 NOTE — PROGRESS NOTES
Name: Jayla Simpson      : 1935      MRN: [id number]  Encounter Provider: Lisha Davidson MD  Encounter Date: 2023   Encounter department: [address]     1  Primary hypertension  Assessment & Plan:  Assessment of hypertension on today's visit indicates good control recommend continuation of the patient's current hypertensive medications  A comprehensive metabolic profile to evaluate kidney performance and electrolyte balance has been requested today  Orders:  -     Comprehensive metabolic panel; Future    2  Screening for deficiency anemia  -     CBC and differential; Future    3  Screening for hypothyroidism  -     TSH, 3rd generation with Free T4 reflex; Future    4  Primary osteoarthritis of both knees  Assessment & Plan:  Advanced osteoarthritis of both knees reviewed with the patient  She has degenerative changes on previous studies of the knees by x-ray  She ambulates with a walker at all times to minimize the risk of falls  She has been using lidocaine patches and Tylenol to control her pain  5  Anxiety  Assessment & Plan:  Anxiety symptoms appear to be adequately controlled at this time recommend continuation of alprazolam   No indication of excessive sedation from medication      6  Glucose intolerance  Assessment & Plan:  History of mild glucose intolerance reviewed advised the patient to avoid saturated sugars and excessive carbohydrates  An updated fasting blood sugar has been requested  BMI Counseling: Body mass index is 29 9 kg/m²  The BMI is above normal  Nutrition recommendations include decreasing portion sizes, limiting drinks that contain sugar and moderation in carbohydrate intake  Rationale for BMI follow-up plan is due to patient being overweight or obese  Subjective      This pleasant 81-year-old female patient returns today for a routine follow-up visit  She is living at [facility]    She has ongoing issues with her vision  As she is followed by Dr Porsha Fernandes her eye care  She has significant arthritic issues with both shoulders as well as both knees  She walks with a walker at this point for her safety  She denies any recent falls  She has been using a combination of Tylenol and lidocaine patches to control her arthritis symptoms  She has been participating in activities at [employer]  She goes to the dining room for her meals  She denies any cardiac symptoms of chest pain palpitations or shortness of breath  She has no significant peripheral edema and denies any recent infection symptoms  She has significant restriction in range of motion of both shoulders and also stiffness and swelling in both knees  Review of Systems   Eyes:        Vision impairment   Endocrine: Positive for heat intolerance  Patient states she feels warm all the time no evidence of temperature elevation   Musculoskeletal: Positive for arthralgias, gait problem and joint swelling  Current Outpatient Medications on File Prior to Visit   Medication Sig   • Acetaminophen Extra Strength 500 MG tablet TAKE 1 TABLET ORALLY TWICE DAILY FOR PAIN *NOT TO EXCEED 4GMS APAP/24HR*   • ALPRAZolam (XANAX) 0 25 mg tablet TAKE 1 TABLET ORALLY IN THE MORNING (ANXIETY) (CONTROL) *REORDER*;TAKE 2 TABLETS (0 5MG) ORALLY AT BEDTIME (ANXIETY) (CONTROL) *REORDER* Do not start before January 6, 2023     • amLODIPine (NORVASC) 2 5 mg tablet Take 2 tablets (5 mg total) by mouth daily   • amLODIPine (NORVASC) 5 mg tablet    • aspirin 81 MG tablet Take 1 tablet by mouth daily 3 times a week   • Chest Congestion Relief 100 MG/5ML oral liquid GIVE 10ML (200MG) ORALLY THREE TIMES DAILY AS NEEDED FOR COUGH *REORDER*   • cyclopentolate (CYCLOGYL) 1 % ophthalmic solution INSTILL 1 DROP INTO RIGHT EYE THREE TIMES DAILY (DETACHED RENTINAL SURGERY) *REORDER*   • ergocalciferol (VITAMIN D2) 50,000 units GIVE 1 CAPSULE ORALLY EVERY "WEEK (SUPPLEMENT) (DO NOT CRUSH) *REORDER*   • famotidine (PEPCID) 20 mg tablet GIVE 1 TABLET ORALLY DAILY (GASTROESOPHAGEAL REFLUX DISEASE)   • fluticasone (FLONASE) 50 mcg/act nasal spray SPRAY 1 SPRAY INTO EACH NOSTRIL TWICE A DAY   • glycerin-hypromellose- (Artificial Tears) 0 2-0 2-1 % SOLN Administer 1 drop to both eyes 2 (two) times a day   • Lidocaine 4 % PTCH Apply 1 patch daily to 1 site may be used to treat shoulder or knee pain Remove in 12 hours   • lisinopril (ZESTRIL) 40 mg tablet GIVE 1 TABLET ORALLY DAILY (HYPERTENSION)   • loratadine (CLARITIN) 10 mg tablet GIVE 1 TABLET ORALLY DAILY (ALLERGIES)   • Lumigan 0 01 % ophthalmic drops    • Premarin vaginal cream APPLY TOPICALLY 1/4 INCH WITH FINGER TO VAGINA AT BEDTIME ON MONDAY AND FRIDAY (MENOPAUSAL TREATMENT) *REORDER*   • Simbrinza 1-0 2 % SUSP    • polymyxin b-trimethoprim (POLYTRIM) ophthalmic solution    • prednisoLONE acetate (PRED FORTE) 1 % ophthalmic suspension INSTILL 1 DROP INTO RIGHT EYE FOUR TIMES A DAY RIGHT EYE ONLY, START 2 DAYS BEFORE SURGERY   • Restasis 0 05 % ophthalmic emulsion INSTILL 1 DROP INTO EACH EYE TWICE DAILY FOR DRY EYE *BRAND PER INSURANCE* *REORDER*       Objective     /78   Pulse 87   Temp 97 6 °F (36 4 °C)   Ht 5' 4\" (1 626 m)   Wt 79 kg (174 lb 3 2 oz)   SpO2 96%   BMI 29 90 kg/m²     Physical Exam  Demetrice Alvarado MD  "

## 2023-05-09 NOTE — ASSESSMENT & PLAN NOTE
Anxiety symptoms appear to be adequately controlled at this time recommend continuation of alprazolam   No indication of excessive sedation from medication

## 2023-05-09 NOTE — ASSESSMENT & PLAN NOTE
Advanced osteoarthritis of both knees reviewed with the patient  She has degenerative changes on previous studies of the knees by x-ray  She ambulates with a walker at all times to minimize the risk of falls  She has been using lidocaine patches and Tylenol to control her pain

## 2023-05-18 DIAGNOSIS — K21.9 GASTROESOPHAGEAL REFLUX DISEASE WITHOUT ESOPHAGITIS: ICD-10-CM

## 2023-05-18 RX ORDER — FAMOTIDINE 20 MG/1
TABLET, FILM COATED ORAL
Qty: 30 TABLET | Refills: 5 | Status: SHIPPED | OUTPATIENT
Start: 2023-05-18

## 2023-06-13 DIAGNOSIS — N95.2 VAGINAL ATROPHY: ICD-10-CM

## 2023-06-13 RX ORDER — CONJUGATED ESTROGENS 0.62 MG/G
CREAM VAGINAL
Qty: 30 G | Refills: 5 | Status: SHIPPED | OUTPATIENT
Start: 2023-06-13

## 2023-06-19 DIAGNOSIS — E55.9 VITAMIN D DEFICIENCY: ICD-10-CM

## 2023-06-19 RX ORDER — ERGOCALCIFEROL 1.25 MG/1
CAPSULE ORAL
Qty: 4 CAPSULE | Refills: 5 | Status: SHIPPED | OUTPATIENT
Start: 2023-06-19

## 2023-06-21 DIAGNOSIS — I10 ESSENTIAL HYPERTENSION: ICD-10-CM

## 2023-06-21 DIAGNOSIS — T78.40XA ALLERGY, INITIAL ENCOUNTER: ICD-10-CM

## 2023-06-21 RX ORDER — LORATADINE 10 MG/1
TABLET ORAL
Qty: 30 TABLET | Refills: 5 | Status: SHIPPED | OUTPATIENT
Start: 2023-06-21

## 2023-06-21 RX ORDER — LISINOPRIL 40 MG/1
TABLET ORAL
Qty: 30 TABLET | Refills: 5 | Status: SHIPPED | OUTPATIENT
Start: 2023-06-21

## 2023-06-26 ENCOUNTER — TELEPHONE (OUTPATIENT)
Dept: INTERNAL MEDICINE CLINIC | Facility: CLINIC | Age: 88
End: 2023-06-26

## 2023-06-26 NOTE — TELEPHONE ENCOUNTER
[facility] stefan called and Patient has right shoulder pain and patient states it is a 7/10 and is not at distress and  is at bin at moment  Alan Sorenson can be reached at [phone] ext [phone]    Thank you

## 2023-06-26 NOTE — TELEPHONE ENCOUNTER
Call return to [facility] patient does have a history of chronic shoulder pain refer her back to orthopedics [facility] for further evaluation and recommendations for pain control

## 2023-07-05 DIAGNOSIS — F41.9 ANXIETY: ICD-10-CM

## 2023-07-05 RX ORDER — ALPRAZOLAM 0.25 MG/1
TABLET ORAL
Qty: 90 TABLET | Refills: 0 | Status: SHIPPED | OUTPATIENT
Start: 2023-07-05

## 2023-07-17 DIAGNOSIS — F41.9 ANXIETY: ICD-10-CM

## 2023-07-17 DIAGNOSIS — M19.90 ARTHRITIS: ICD-10-CM

## 2023-07-18 DIAGNOSIS — F41.9 ANXIETY: ICD-10-CM

## 2023-07-18 RX ORDER — ALPRAZOLAM 0.25 MG/1
TABLET ORAL
Qty: 90 TABLET | Refills: 5 | OUTPATIENT
Start: 2023-07-18

## 2023-07-18 RX ORDER — LIDOCAINE PAIN RELIEF 40 MG/1000MG
PATCH TOPICAL
Qty: 30 PATCH | Refills: 11 | Status: SHIPPED | OUTPATIENT
Start: 2023-07-18

## 2023-07-26 ENCOUNTER — TELEPHONE (OUTPATIENT)
Dept: INTERNAL MEDICINE CLINIC | Facility: CLINIC | Age: 88
End: 2023-07-26

## 2023-07-26 NOTE — TELEPHONE ENCOUNTER
Patient is having ear pain in both ears and we do not have any openings. Are you able to call in a prescription and I can call [facility] back at [phone] ext [phone].   Thank you

## 2023-08-01 DIAGNOSIS — F41.9 ANXIETY: ICD-10-CM

## 2023-08-01 RX ORDER — ALPRAZOLAM 0.25 MG/1
TABLET ORAL
Qty: 90 TABLET | Refills: 5 | Status: SHIPPED | OUTPATIENT
Start: 2023-08-01

## 2023-08-09 DIAGNOSIS — I51.9 CARDIAC DISEASE: Primary | ICD-10-CM

## 2023-08-09 RX ORDER — ASPIRIN 81 MG
TABLET,CHEWABLE ORAL
Qty: 12 TABLET | Refills: 11 | Status: SHIPPED | OUTPATIENT
Start: 2023-08-09

## 2023-09-15 ENCOUNTER — RA CDI HCC (OUTPATIENT)
Dept: OTHER | Facility: HOSPITAL | Age: 88
End: 2023-09-15

## 2023-09-20 ENCOUNTER — OFFICE VISIT (OUTPATIENT)
Dept: INTERNAL MEDICINE CLINIC | Facility: CLINIC | Age: 88
End: 2023-09-20
Payer: MEDICARE

## 2023-09-20 VITALS
OXYGEN SATURATION: 97 % | DIASTOLIC BLOOD PRESSURE: 60 MMHG | TEMPERATURE: 96.8 F | HEART RATE: 90 BPM | SYSTOLIC BLOOD PRESSURE: 122 MMHG | BODY MASS INDEX: 30.8 KG/M2 | HEIGHT: 64 IN | WEIGHT: 180.4 LBS

## 2023-09-20 DIAGNOSIS — I10 PRIMARY HYPERTENSION: ICD-10-CM

## 2023-09-20 DIAGNOSIS — M19.90 ARTHRITIS: Primary | ICD-10-CM

## 2023-09-20 DIAGNOSIS — E74.39 GLUCOSE INTOLERANCE: ICD-10-CM

## 2023-09-20 DIAGNOSIS — B37.2 YEAST INFECTION OF THE SKIN: ICD-10-CM

## 2023-09-20 DIAGNOSIS — Z00.00 HEALTHCARE MAINTENANCE: ICD-10-CM

## 2023-09-20 DIAGNOSIS — Z23 ENCOUNTER FOR IMMUNIZATION: ICD-10-CM

## 2023-09-20 DIAGNOSIS — E78.5 HYPERLIPIDEMIA, UNSPECIFIED HYPERLIPIDEMIA TYPE: ICD-10-CM

## 2023-09-20 DIAGNOSIS — I10 PRIMARY HYPERTENSION: Primary | ICD-10-CM

## 2023-09-20 DIAGNOSIS — F41.9 ANXIETY: ICD-10-CM

## 2023-09-20 DIAGNOSIS — Z13.0 SCREENING FOR DEFICIENCY ANEMIA: ICD-10-CM

## 2023-09-20 DIAGNOSIS — I10 ESSENTIAL HYPERTENSION: ICD-10-CM

## 2023-09-20 PROBLEM — U07.1 COVID: Status: RESOLVED | Noted: 2022-07-05 | Resolved: 2023-09-20

## 2023-09-20 PROCEDURE — G0008 ADMIN INFLUENZA VIRUS VAC: HCPCS | Performed by: INTERNAL MEDICINE

## 2023-09-20 PROCEDURE — G0439 PPPS, SUBSEQ VISIT: HCPCS | Performed by: INTERNAL MEDICINE

## 2023-09-20 PROCEDURE — 90662 IIV NO PRSV INCREASED AG IM: CPT | Performed by: INTERNAL MEDICINE

## 2023-09-20 PROCEDURE — 99215 OFFICE O/P EST HI 40 MIN: CPT | Performed by: INTERNAL MEDICINE

## 2023-09-20 RX ORDER — MELOXICAM 7.5 MG/1
7.5 TABLET ORAL DAILY
Qty: 30 TABLET | Refills: 1 | Status: SHIPPED | OUTPATIENT
Start: 2023-09-20

## 2023-09-20 RX ORDER — AMLODIPINE BESYLATE 5 MG/1
TABLET ORAL
Qty: 30 TABLET | Refills: 11 | Status: SHIPPED | OUTPATIENT
Start: 2023-09-20

## 2023-09-20 RX ORDER — KETOCONAZOLE 20 MG/ML
SHAMPOO TOPICAL
COMMUNITY
Start: 2023-08-30

## 2023-09-20 RX ORDER — NYSTATIN 100000 [USP'U]/G
POWDER TOPICAL 2 TIMES DAILY
Qty: 30 G | Refills: 2 | Status: SHIPPED | OUTPATIENT
Start: 2023-09-20

## 2023-09-20 NOTE — ASSESSMENT & PLAN NOTE
We reviewed today the patient's chronic arthritic issues in both shoulders and knees. X-rays have been reviewed in preparation for today's visit with the patient. Clearly this is a quality-of-life issue for this patient. I am reluctant to start her on any narcotic level medications given early signs of dementia. We discussed meloxicam 7-1/2 mg daily indicating to the patient there is some risk of stomach irritation and kidney issues associated with this medication but she is willing to accept those risks given the fact that it may help reduce her discomfort. We will initiate the medication once a day to be taken with breakfast and I will see her in 4 weeks to reassess the benefit of the medication.   She can continue with the lidocaine patch applied to the joint of most discomfort on a daily basis

## 2023-09-20 NOTE — PROGRESS NOTES
Assessment and Plan:     Problem List Items Addressed This Visit        Cardiovascular and Mediastinum    Hypertension     Blood pressure assessment during today's visit indicates good control recommend continuation of lisinopril at 40 mg daily and amlodipine at 5 mg daily I have requested a comprehensive metabolic profile to evaluate kidney performance and electrolyte balance. Musculoskeletal and Integument    Arthritis     We reviewed today the patient's chronic arthritic issues in both shoulders and knees. X-rays have been reviewed in preparation for today's visit with the patient. Clearly this is a quality-of-life issue for this patient. I am reluctant to start her on any narcotic level medications given early signs of dementia. We discussed meloxicam 7-1/2 mg daily indicating to the patient there is some risk of stomach irritation and kidney issues associated with this medication but she is willing to accept those risks given the fact that it may help reduce her discomfort. We will initiate the medication once a day to be taken with breakfast and I will see her in 4 weeks to reassess the benefit of the medication. She can continue with the lidocaine patch applied to the joint of most discomfort on a daily basis         Relevant Medications    meloxicam (Mobic) 7.5 mg tablet    Yeast infection of the skin     Rash of both axilla consistent with a fungal infection of the skin prescription for nystatin powder provided today applied twice a day until resolution of rash         Relevant Medications    ciclopirox (LOPROX) 0.77 % cream    ketoconazole (NIZORAL) 2 % shampoo    nystatin (MYCOSTATIN) powder       Other    Anxiety     Anxiety symptoms are quite stable on today's examination and review of symptoms with the patient.   Continue on current dosing of alprazolam.         Hyperlipidemia     Recommend continuation of low-cholesterol diet and updated lipid profile has been requested and will be reviewed upon completion         Relevant Orders    Lipid panel    Glucose intolerance     Previous mild elevation in glucose has been reviewed. A updated comprehensive metabolic profile has been requested. Relevant Orders    UA w Reflex to Microscopic w Reflex to Culture -Lab Collect    Comprehensive metabolic panel   Other Visit Diagnoses     Essential hypertension    -  Primary    Relevant Orders    UA w Reflex to Microscopic w Reflex to Culture -Lab Collect    Comprehensive metabolic panel    Screening for deficiency anemia        Relevant Orders    CBC and differential    Encounter for immunization        Relevant Orders    influenza vaccine, high-dose, PF 0.7 mL (FLUZONE HIGH-DOSE) (Completed)           Preventive health issues were discussed with patient, and age appropriate screening tests were ordered as noted in patient's After Visit Summary. Personalized health advice and appropriate referrals for health education or preventive services given if needed, as noted in patient's After Visit Summary. History of Present Illness:     Patient presents for a Medicare Wellness Visit    This 61-year-old female patient presents today for an annual physical examination review of her current medical conditions. She indicates that she has experiencing ongoing arthritic type pain in both of her shoulders as well as both of her knees. She has well-documented arthritis in these locations. She has been using a lidocaine patch on the most painful location once a day. She is inquiring whether or not there is any other type of medication that we can use to try to control her arthritis. Previous injections of cortisone into her joints resulted in side effects of the medication with increased blood pressure and the patient feeling poorly for several days.   We did discuss the potential for an oral nonsteroidal anti-inflammatory medication which seems the best choice I did explain to the patient that there are risks involved in using these medications including the possibility of stomach irritation and ulcer formation as well as adverse effects on kidney performance. Knowledge of these potential side effects the patient would still like to try the medication. We will start her on meloxicam 7-1/2 mg daily at breakfast I will have her come back in about a month for follow-up assessment to see how she does with the medication. We are also requesting a comprehensive metabolic profile to check on kidney performance. Patient continues to struggle with issues with her vision. She is followed by both ophthalmology and retinal specialist.  Demi Goddard has significantly declined over the past several years. Patient no longer operates a motor vehicle and cannot see her medications well enough to self administer medications. We do see some early signs of dementia and the patient today some issues with slow cognitive processing and occasional forgetfulness. She is currently in an assisted living type of scenario which I believe is safe for her medications are supervised. Patient Care Team:  Carline Soler MD as PCP - General  MD Lori Hyman PA-C     Review of Systems:     Review of Systems   Constitutional: Positive for fatigue. Eyes: Positive for visual disturbance. Musculoskeletal: Positive for arthralgias and gait problem. Bilateral chronic shoulder pain and bilateral chronic knee pain   Neurological:        Mild cognitive impairment   All other systems reviewed and are negative.        Problem List:     Patient Active Problem List   Diagnosis   • Anxiety   • Fibromyalgia   • Hypertension   • Hyperlipidemia   • Glucose intolerance   • Flatulence   • Depression   • Bunion of great toe   • Hammertoe of right foot   • Allergy   • Varicose veins of both lower extremities   • Nail fungus   • Other fatigue   • Chronic left shoulder pain   • Arthritis   • Internal derangement of shoulder, right   • Rotator cuff tear arthropathy of right shoulder   • Adjustment insomnia   • Primary osteoarthritis of both knees   • Palpitations   • Nonrheumatic aortic valve insufficiency   • Nonrheumatic tricuspid valve regurgitation   • Nonrheumatic pulmonary valve insufficiency   • Yeast infection of the skin      Past Medical and Surgical History:     Past Medical History:   Diagnosis Date   • Epistaxis     RESOLVED: 2/18/17   • Saphenous vein thrombophlebitis, right 2012     Past Surgical History:   Procedure Laterality Date   • GALLBLADDER SURGERY  2006      Family History:     Family History   Problem Relation Age of Onset   • Hypertension Mother       Social History:     Social History     Socioeconomic History   • Marital status: /Civil Union     Spouse name: None   • Number of children: None   • Years of education: None   • Highest education level: None   Occupational History   • None   Tobacco Use   • Smoking status: Never   • Smokeless tobacco: Never   Vaping Use   • Vaping Use: Never used   Substance and Sexual Activity   • Alcohol use: Not Currently   • Drug use: Never   • Sexual activity: Not Currently   Other Topics Concern   • None   Social History Narrative   • None     Social Determinants of Health     Financial Resource Strain: Low Risk  (9/20/2023)    Overall Financial Resource Strain (CARDIA)    • Difficulty of Paying Living Expenses: Not very hard   Food Insecurity: Not on file   Transportation Needs: No Transportation Needs (9/20/2023)    PRAPARE - Transportation    • Lack of Transportation (Medical): No    • Lack of Transportation (Non-Medical):  No   Physical Activity: Not on file   Stress: Not on file   Social Connections: Not on file   Intimate Partner Violence: Not on file   Housing Stability: Not on file      Medications and Allergies:     Current Outpatient Medications   Medication Sig Dispense Refill   • Acetaminophen Extra Strength 500 MG tablet TAKE 1 TABLET ORALLY TWICE DAILY FOR PAIN *NOT TO EXCEED 4GMS APAP/24HR* 60 tablet 5   • Allergy Relief 10 MG tablet GIVE 1 TABLET ORALLY DAILY (ALLERGIES) 30 tablet 5   • ALPRAZolam (XANAX) 0.25 mg tablet TAKE 1 TABLET ORALLY IN THE MORNING FOR ANXIETY (CONTROL) *REORDER*;TAKE 2 TABLETS (0.5MG) ORALLY AT BEDTIME FOR ANXIETY (CONTROL) *REORDER* 90 tablet 5   • amLODIPine (NORVASC) 5 mg tablet      • aspirin 81 MG tablet Take 1 tablet by mouth daily 3 times a week     • Aspirin Low Dose 81 MG chewable tablet CHEW 1 TABLET AND SWALLOW ORALLY THREE TIMES WEEKLY (CARDIAC DISEASE) *REORDER* 12 tablet 11   • Chest Congestion Relief 100 MG/5ML oral liquid GIVE 10ML (200MG) ORALLY THREE TIMES DAILY AS NEEDED FOR COUGH *REORDER* 473 mL 5   • cyclopentolate (CYCLOGYL) 1 % ophthalmic solution INSTILL 1 DROP INTO RIGHT EYE THREE TIMES DAILY (DETACHED RENTINAL SURGERY) *REORDER* 2 mL 5   • ergocalciferol (VITAMIN D2) 50,000 units GIVE 1 CAPSULE ORALLY EVERY WEEK (SUPPLEMENT) (DO NOT CRUSH) *REORDER* 4 capsule 5   • famotidine (PEPCID) 20 mg tablet GIVE 1 TABLET ORALLY DAILY (GASTROESOPHAGEAL REFLUX DISEASE) 30 tablet 5   • fluticasone (FLONASE) 50 mcg/act nasal spray SPRAY 1 SPRAY INTO EACH NOSTRIL TWICE A DAY 48 mL 3   • glycerin-hypromellose- (Artificial Tears) 0.2-0.2-1 % SOLN Administer 1 drop to both eyes 2 (two) times a day 15 mL 5   • ketoconazole (NIZORAL) 2 % shampoo      • Lidocaine Pain Relief 4 % PTCH APPLY 1 PATCH TOPICALLY TO ONE SITE (TREAT SHOULDER OR KNEE PAIN) DAILY AND REMOVE IN 12 HOURS (PAIN) *REORDER* 30 patch 11   • lisinopril (ZESTRIL) 40 mg tablet GIVE 1 TABLET ORALLY DAILY (HYPERTENSION) 30 tablet 5   • Lumigan 0.01 % ophthalmic drops      • meloxicam (Mobic) 7.5 mg tablet Take 1 tablet (7.5 mg total) by mouth daily Take daily in the morning with meal 30 tablet 1   • nystatin (MYCOSTATIN) powder Apply topically 2 (two) times a day 30 g 2   • Premarin vaginal cream APPLY TOPICALLY 1/4 INCH WITH FINGER TO VAGINA AT BEDTIME ON MONDAY AND FRIDAY (MENOPAUSAL TREATMENT) *REORDER* 30 g 5   • Simbrinza 1-0.2 % SUSP      • amLODIPine (NORVASC) 2.5 mg tablet Take 2 tablets (5 mg total) by mouth daily (Patient not taking: Reported on 9/20/2023) 60 tablet 5   • ciclopirox (LOPROX) 0.77 % cream      • polymyxin b-trimethoprim (POLYTRIM) ophthalmic solution      • prednisoLONE acetate (PRED FORTE) 1 % ophthalmic suspension INSTILL 1 DROP INTO RIGHT EYE FOUR TIMES A DAY RIGHT EYE ONLY, START 2 DAYS BEFORE SURGERY     • Restasis 0.05 % ophthalmic emulsion INSTILL 1 DROP INTO EACH EYE TWICE DAILY FOR DRY EYE *BRAND PER INSURANCE* *REORDER* 5.5 mL 5     No current facility-administered medications for this visit. Allergies   Allergen Reactions   • Acetazolamide    • Ciprofloxacin Headache and Nausea Only   • Nitrofurantoin    • Pollen Extract    • Sulfa Antibiotics    • Keflex [Cephalexin] Itching      Immunizations:     Immunization History   Administered Date(s) Administered   • COVID-19 PFIZER VACCINE 0.3 ML IM 02/03/2021, 02/26/2021, 01/22/2022   • INFLUENZA 11/09/2022   • Influenza Split High Dose Preservative Free IM 10/18/2016, 10/16/2017   • Influenza, high dose seasonal 0.7 mL 10/31/2018, 09/30/2019, 10/13/2020, 10/21/2021, 11/09/2022, 09/20/2023   • Pneumococcal Conjugate 13-Valent 02/17/2017      Health Maintenance:         Topic Date Due   • Hepatitis C Screening  Completed         Topic Date Due   • Pneumococcal Vaccine: 65+ Years (2 - PPSV23 if available, else PCV20) 02/17/2018   • COVID-19 Vaccine (4 - Pfizer series) 03/19/2022      Medicare Screening Tests and Risk Assessments: Angie Lu is here for her Subsequent Wellness visit. Last Medicare Wellness visit information reviewed, patient interviewed, no change since last AWV. Health Risk Assessment:   Patient rates overall health as fair. Patient feels that their physical health rating is slightly worse. Patient is dissatisfied with their life. Eyesight was rated as slightly worse. Hearing was rated as same. Patient feels that their emotional and mental health rating is same. Patients states they are never, rarely angry. Patient states they are never, rarely unusually tired/fatigued. Pain experienced in the last 7 days has been a lot. Patient's pain rating has been 6/10. Patient states that she has experienced no weight loss or gain in last 6 months. Depression Screening:   PHQ-9 Score: 3      Fall Risk Screening: In the past year, patient has experienced: no history of falling in past year      Urinary Incontinence Screening:   Patient has not leaked urine accidently in the last six months. Home Safety:  Patient does not have trouble with stairs inside or outside of their home. Patient has working smoke alarms and has working carbon monoxide detector. Home safety hazards include: none. Nutrition:   Current diet is Regular. Medications:   Patient is currently taking over-the-counter supplements. OTC medications include: see medication list. Patient is not able to manage medications. Activities of Daily Living (ADLs)/Instrumental Activities of Daily Living (IADLs):   Walk and transfer into and out of bed and chair?: Yes  Dress and groom yourself?: Yes    Bathe or shower yourself?: Yes    Feed yourself? Yes  Do your laundry/housekeeping?: No  Manage your money, pay your bills and track your expenses?: No  Make your own meals?: No    Do your own shopping?: No    Previous Hospitalizations:   Any hospitalizations or ED visits within the last 12 months?: No      Advance Care Planning:   Living will: Yes    Durable POA for healthcare:  Yes    Advanced directive: Yes      PREVENTIVE SCREENINGS      Cardiovascular Screening:    General: Screening Not Indicated and History Lipid Disorder      Colorectal Cancer Screening:     General: Screening Not Indicated      Cervical Cancer Screening:    General: Screening Not Indicated      Lung Cancer Screening:     General: Screening Not Indicated      Hepatitis C Screening:    General: Screening Current    Screening, Brief Intervention, and Referral to Treatment (SBIRT)    Screening    Typical number of drinks in a week: 1    Single Item Drug Screening:  How often have you used an illegal drug (including marijuana) or a prescription medication for non-medical reasons in the past year? never    Single Item Drug Screen Score: 0  Interpretation: Negative screen for possible drug use disorder    No results found. Physical Exam:     /60   Pulse 90   Temp (!) 96.8 °F (36 °C)   Ht 5' 4" (1.626 m)   Wt 81.8 kg (180 lb 6.4 oz)   SpO2 97%   BMI 30.97 kg/m²     Physical Exam  Vitals and nursing note reviewed. Constitutional:       General: She is not in acute distress. Appearance: She is well-developed. She is not ill-appearing. HENT:      Head: Normocephalic and atraumatic. Right Ear: Tympanic membrane and ear canal normal.      Left Ear: Tympanic membrane and ear canal normal.      Nose: Nose normal.      Mouth/Throat:      Mouth: Mucous membranes are moist.      Pharynx: Oropharynx is clear. Eyes:      Conjunctiva/sclera: Conjunctivae normal.      Pupils: Pupils are equal, round, and reactive to light. Neck:      Vascular: No carotid bruit. Cardiovascular:      Rate and Rhythm: Normal rate and regular rhythm. Heart sounds: Murmur heard. Pulmonary:      Effort: Pulmonary effort is normal. No respiratory distress. Breath sounds: Normal breath sounds. No wheezing, rhonchi or rales. Abdominal:      General: Bowel sounds are normal. There is no distension. Palpations: Abdomen is soft. There is no mass. Tenderness: There is no abdominal tenderness. There is no guarding. Musculoskeletal:         General: Tenderness present. No swelling. Cervical back: Normal range of motion and neck supple. No rigidity or tenderness. Right lower leg: No edema. Left lower leg: No edema.       Comments: Tenderness in both shoulders as well as both knees   Lymphadenopathy:      Cervical: No cervical adenopathy. Skin:     General: Skin is warm and dry. Capillary Refill: Capillary refill takes less than 2 seconds. Coloration: Skin is not jaundiced or pale. Neurological:      Mental Status: She is alert. Mental status is at baseline.       Comments: Mild cognitive slowing and early forgetfulness   Psychiatric:         Mood and Affect: Mood normal.         Behavior: Behavior normal.          Jd Lugo MD

## 2023-09-20 NOTE — ASSESSMENT & PLAN NOTE
Blood pressure assessment during today's visit indicates good control recommend continuation of lisinopril at 40 mg daily and amlodipine at 5 mg daily I have requested a comprehensive metabolic profile to evaluate kidney performance and electrolyte balance.

## 2023-09-20 NOTE — ASSESSMENT & PLAN NOTE
Recommend continuation of low-cholesterol diet and updated lipid profile has been requested and will be reviewed upon completion

## 2023-09-20 NOTE — ASSESSMENT & PLAN NOTE
Rash of both axilla consistent with a fungal infection of the skin prescription for nystatin powder provided today applied twice a day until resolution of rash

## 2023-09-20 NOTE — PATIENT INSTRUCTIONS
Medicare Preventive Visit Patient Instructions  Thank you for completing your Welcome to Medicare Visit or Medicare Annual Wellness Visit today. Your next wellness visit will be due in one year (9/20/2024). The screening/preventive services that you may require over the next 5-10 years are detailed below. Some tests may not apply to you based off risk factors and/or age. Screening tests ordered at today's visit but not completed yet may show as past due. Also, please note that scanned in results may not display below. Preventive Screenings:  Service Recommendations Previous Testing/Comments   Colorectal Cancer Screening  * Colonoscopy    * Fecal Occult Blood Test (FOBT)/Fecal Immunochemical Test (FIT)  * Fecal DNA/Cologuard Test  * Flexible Sigmoidoscopy Age: 43-73 years old   Colonoscopy: every 10 years (may be performed more frequently if at higher risk)  OR  FOBT/FIT: every 1 year  OR  Cologuard: every 3 years  OR  Sigmoidoscopy: every 5 years  Screening may be recommended earlier than age 39 if at higher risk for colorectal cancer. Also, an individualized decision between you and your healthcare provider will decide whether screening between the ages of 77-80 would be appropriate. Colonoscopy: Not on file  FOBT/FIT: Not on file  Cologuard: Not on file  Sigmoidoscopy: Not on file    Screening Not Indicated     Breast Cancer Screening Age: 36 years old  Frequency: every 1-2 years  Not required if history of left and right mastectomy Mammogram: 02/14/2019        Cervical Cancer Screening Between the ages of 21-29, pap smear recommended once every 3 years. Between the ages of 32-69, can perform pap smear with HPV co-testing every 5 years.    Recommendations may differ for women with a history of total hysterectomy, cervical cancer, or abnormal pap smears in past. Pap Smear: Not on file    Screening Not Indicated   Hepatitis C Screening Once for adults born between 1945 and 1965  More frequently in patients at high risk for Hepatitis C Hep C Antibody: 04/20/2021    Screening Current   Diabetes Screening 1-2 times per year if you're at risk for diabetes or have pre-diabetes Fasting glucose: 102 mg/dL (8/16/2022)  A1C: No results in last 5 years (No results in last 5 years)      Cholesterol Screening Once every 5 years if you don't have a lipid disorder. May order more often based on risk factors. Lipid panel: 06/15/2022    Screening Not Indicated  History Lipid Disorder     Other Preventive Screenings Covered by Medicare:  1. Abdominal Aortic Aneurysm (AAA) Screening: covered once if your at risk. You're considered to be at risk if you have a family history of AAA. 2. Lung Cancer Screening: covers low dose CT scan once per year if you meet all of the following conditions: (1) Age 48-67; (2) No signs or symptoms of lung cancer; (3) Current smoker or have quit smoking within the last 15 years; (4) You have a tobacco smoking history of at least 20 pack years (packs per day multiplied by number of years you smoked); (5) You get a written order from a healthcare provider. 3. Glaucoma Screening: covered annually if you're considered high risk: (1) You have diabetes OR (2) Family history of glaucoma OR (3)  aged 48 and older OR (3)  American aged 72 and older  3. Osteoporosis Screening: covered every 2 years if you meet one of the following conditions: (1) You're estrogen deficient and at risk for osteoporosis based off medical history and other findings; (2) Have a vertebral abnormality; (3) On glucocorticoid therapy for more than 3 months; (4) Have primary hyperparathyroidism; (5) On osteoporosis medications and need to assess response to drug therapy. · Last bone density test (DXA Scan): Not on file. 5. HIV Screening: covered annually if you're between the age of 14-79. Also covered annually if you are younger than 13 and older than 72 with risk factors for HIV infection.  For pregnant patients, it is covered up to 3 times per pregnancy. Immunizations:  Immunization Recommendations   Influenza Vaccine Annual influenza vaccination during flu season is recommended for all persons aged >= 6 months who do not have contraindications   Pneumococcal Vaccine   * Pneumococcal conjugate vaccine = PCV13 (Prevnar 13), PCV15 (Vaxneuvance), PCV20 (Prevnar 20)  * Pneumococcal polysaccharide vaccine = PPSV23 (Pneumovax) Adults 20-63 years old: 1-3 doses may be recommended based on certain risk factors  Adults 72 years old: 1-2 doses may be recommended based off what pneumonia vaccine you previously received   Hepatitis B Vaccine 3 dose series if at intermediate or high risk (ex: diabetes, end stage renal disease, liver disease)   Tetanus (Td) Vaccine - COST NOT COVERED BY MEDICARE PART B Following completion of primary series, a booster dose should be given every 10 years to maintain immunity against tetanus. Td may also be given as tetanus wound prophylaxis. Tdap Vaccine - COST NOT COVERED BY MEDICARE PART B Recommended at least once for all adults. For pregnant patients, recommended with each pregnancy. Shingles Vaccine (Shingrix) - COST NOT COVERED BY MEDICARE PART B  2 shot series recommended in those aged 48 and above     Health Maintenance Due:      Topic Date Due   • Hepatitis C Screening  Completed     Immunizations Due:      Topic Date Due   • Pneumococcal Vaccine: 65+ Years (2 - PPSV23 if available, else PCV20) 02/17/2018   • COVID-19 Vaccine (4 - Pfizer series) 03/19/2022   • Influenza Vaccine (1) 09/01/2023     Advance Directives   What are advance directives? Advance directives are legal documents that state your wishes and plans for medical care. These plans are made ahead of time in case you lose your ability to make decisions for yourself. Advance directives can apply to any medical decision, such as the treatments you want, and if you want to donate organs. What are the types of advance directives? There are many types of advance directives, and each state has rules about how to use them. You may choose a combination of any of the following:  · Living will: This is a written record of the treatment you want. You can also choose which treatments you do not want, which to limit, and which to stop at a certain time. This includes surgery, medicine, IV fluid, and tube feedings. · Durable power of  for healthcare [facility]): This is a written record that states who you want to make healthcare choices for you when you are unable to make them for yourself. This person, called a proxy, is usually a family member or a friend. You may choose more than 1 proxy. · Do not resuscitate (DNR) order:  A DNR order is used in case your heart stops beating or you stop breathing. It is a request not to have certain forms of treatment, such as CPR. A DNR order may be included in other types of advance directives. · Medical directive: This covers the care that you want if you are in a coma, near death, or unable to make decisions for yourself. You can list the treatments you want for each condition. Treatment may include pain medicine, surgery, blood transfusions, dialysis, IV or tube feedings, and a ventilator (breathing machine). · Values history: This document has questions about your views, beliefs, and how you feel and think about life. This information can help others choose the care that you would choose. Why are advance directives important? An advance directive helps you control your care. Although spoken wishes may be used, it is better to have your wishes written down. Spoken wishes can be misunderstood, or not followed. Treatments may be given even if you do not want them. An advance directive may make it easier for your family to make difficult choices about your care.    Weight Management   Why it is important to manage your weight:  Being overweight increases your risk of health conditions such as heart disease, high blood pressure, type 2 diabetes, and certain types of cancer. It can also increase your risk for osteoarthritis, sleep apnea, and other respiratory problems. Aim for a slow, steady weight loss. Even a small amount of weight loss can lower your risk of health problems. How to lose weight safely:  A safe and healthy way to lose weight is to eat fewer calories and get regular exercise. You can lose up about 1 pound a week by decreasing the number of calories you eat by 500 calories each day. Healthy meal plan for weight management:  A healthy meal plan includes a variety of foods, contains fewer calories, and helps you stay healthy. A healthy meal plan includes the following:  · Eat whole-grain foods more often. A healthy meal plan should contain fiber. Fiber is the part of grains, fruits, and vegetables that is not broken down by your body. Whole-grain foods are healthy and provide extra fiber in your diet. Some examples of whole-grain foods are whole-wheat breads and pastas, oatmeal, brown rice, and bulgur. · Eat a variety of vegetables every day. Include dark, leafy greens such as spinach, kale, prashant greens, and mustard greens. Eat yellow and orange vegetables such as carrots, sweet potatoes, and winter squash. · Eat a variety of fruits every day. Choose fresh or canned fruit (canned in its own juice or light syrup) instead of juice. Fruit juice has very little or no fiber. · Eat low-fat dairy foods. Drink fat-free (skim) milk or 1% milk. Eat fat-free yogurt and low-fat cottage cheese. Try low-fat cheeses such as mozzarella and other reduced-fat cheeses. · Choose meat and other protein foods that are low in fat. Choose beans or other legumes such as split peas or lentils. Choose fish, skinless poultry (chicken or turkey), or lean cuts of red meat (beef or pork). Before you cook meat or poultry, cut off any visible fat. · Use less fat and oil.   Try baking foods instead of frying them. Add less fat, such as margarine, sour cream, regular salad dressing and mayonnaise to foods. Eat fewer high-fat foods. Some examples of high-fat foods include french fries, doughnuts, ice cream, and cakes. · Eat fewer sweets. Limit foods and drinks that are high in sugar. This includes candy, cookies, regular soda, and sweetened drinks. Exercise:  Exercise at least 30 minutes per day on most days of the week. Some examples of exercise include walking, biking, dancing, and swimming. You can also fit in more physical activity by taking the stairs instead of the elevator or parking farther away from stores. Ask your healthcare provider about the best exercise plan for you. © Copyright [employer] 2018 Information is for End User's use only and may not be sold, redistributed or otherwise used for commercial purposes.  All illustrations and images included in CareNotes® are the copyrighted property of [employer] or [address] [place] [address]

## 2023-09-20 NOTE — ASSESSMENT & PLAN NOTE
Anxiety symptoms are quite stable on today's examination and review of symptoms with the patient.   Continue on current dosing of alprazolam.

## 2023-09-20 NOTE — ASSESSMENT & PLAN NOTE
Previous mild elevation in glucose has been reviewed. A updated comprehensive metabolic profile has been requested.

## 2023-10-13 DIAGNOSIS — B37.2 YEAST INFECTION OF THE SKIN: ICD-10-CM

## 2023-10-13 RX ORDER — NYSTATIN 100000 [USP'U]/G
POWDER TOPICAL
Qty: 30 G | Refills: 11 | Status: SHIPPED | OUTPATIENT
Start: 2023-10-13

## 2023-10-18 DIAGNOSIS — R52 PAIN: ICD-10-CM

## 2023-10-18 DIAGNOSIS — M19.90 ARTHRITIS: ICD-10-CM

## 2023-10-18 RX ORDER — MELOXICAM 7.5 MG/1
TABLET ORAL
Qty: 30 TABLET | Refills: 11 | Status: SHIPPED | OUTPATIENT
Start: 2023-10-18

## 2023-10-18 RX ORDER — PSEUDOEPHED/ACETAMINOPH/DIPHEN 30MG-500MG
TABLET ORAL
Qty: 60 TABLET | Refills: 11 | Status: SHIPPED | OUTPATIENT
Start: 2023-10-18

## 2023-10-24 ENCOUNTER — OFFICE VISIT (OUTPATIENT)
Dept: INTERNAL MEDICINE CLINIC | Facility: CLINIC | Age: 88
End: 2023-10-24
Payer: MEDICARE

## 2023-10-24 VITALS
DIASTOLIC BLOOD PRESSURE: 70 MMHG | WEIGHT: 181.2 LBS | HEART RATE: 97 BPM | SYSTOLIC BLOOD PRESSURE: 126 MMHG | TEMPERATURE: 96.8 F | BODY MASS INDEX: 30.93 KG/M2 | OXYGEN SATURATION: 95 % | HEIGHT: 64 IN

## 2023-10-24 DIAGNOSIS — E78.5 HYPERLIPIDEMIA, UNSPECIFIED HYPERLIPIDEMIA TYPE: ICD-10-CM

## 2023-10-24 DIAGNOSIS — E78.1 HYPERTRIGLYCERIDEMIA: ICD-10-CM

## 2023-10-24 DIAGNOSIS — M19.90 ARTHRITIS: Primary | ICD-10-CM

## 2023-10-24 DIAGNOSIS — B37.31 VAGINAL YEAST INFECTION: ICD-10-CM

## 2023-10-24 DIAGNOSIS — I10 PRIMARY HYPERTENSION: ICD-10-CM

## 2023-10-24 DIAGNOSIS — F41.9 ANXIETY: ICD-10-CM

## 2023-10-24 PROCEDURE — 99215 OFFICE O/P EST HI 40 MIN: CPT | Performed by: INTERNAL MEDICINE

## 2023-10-24 RX ORDER — FLUCONAZOLE 100 MG/1
100 TABLET ORAL DAILY
Qty: 7 TABLET | Refills: 0 | Status: SHIPPED | OUTPATIENT
Start: 2023-10-24 | End: 2023-10-31

## 2023-10-25 NOTE — ASSESSMENT & PLAN NOTE
Vaginal yeast infection symptoms reviewed with the patient I have prescribed Diflucan 100 mg daily for 7 days

## 2023-10-25 NOTE — ASSESSMENT & PLAN NOTE
Glyceride profile reviewed with the patient recommend decrease in consumption of concentrated sugars and excessive carbohydrates

## 2023-10-25 NOTE — ASSESSMENT & PLAN NOTE
Reviewed the patient's current arthritis symptoms of pain and stiffness in both knees as well as both shoulders.   Recommend continuation of meloxicam 7.5 mg daily in the morning with breakfast Tylenol 1000 mg every 8 hours as needed and topical application of lidocaine patch to most uncomfortable joint

## 2023-10-25 NOTE — ASSESSMENT & PLAN NOTE
Blood pressure assessment today confirms good control of hypertension recommend continuation of current blood pressure medication lisinopril 40 mg daily

## 2023-10-25 NOTE — ASSESSMENT & PLAN NOTE
Anxiety symptoms continue to be well controlled with alprazolam 0.25 mg 1 daily in the morning for anxiety control and 2 tablets daily at bedtime for anxiety relief and sleep  induction

## 2023-10-25 NOTE — PROGRESS NOTES
Name: Tyrel Jane      : 1935      MRN: [id number]  Encounter Provider: Andrea Watson MD  Encounter Date: 10/24/2023   Encounter department: [address] [place] [address]     1. Arthritis  Assessment & Plan:  Reviewed the patient's current arthritis symptoms of pain and stiffness in both knees as well as both shoulders. Recommend continuation of meloxicam 7.5 mg daily in the morning with breakfast Tylenol 1000 mg every 8 hours as needed and topical application of lidocaine patch to most uncomfortable joint      2. Vaginal yeast infection  Assessment & Plan:  Vaginal yeast infection symptoms reviewed with the patient I have prescribed Diflucan 100 mg daily for 7 days    Orders:  -     fluconazole (DIFLUCAN) 100 mg tablet; Take 1 tablet (100 mg total) by mouth daily for 7 days    3. Primary hypertension  Assessment & Plan:  Blood pressure assessment today confirms good control of hypertension recommend continuation of current blood pressure medication lisinopril 40 mg daily      4. Anxiety  Assessment & Plan:  Anxiety symptoms continue to be well controlled with alprazolam 0.25 mg 1 daily in the morning for anxiety control and 2 tablets daily at bedtime for anxiety relief and sleep  induction      5. Hyperlipidemia, unspecified hyperlipidemia type  Assessment & Plan:  Lipid profile reviewed with the patient today does show elevation of total cholesterol and LDL they are gradually improving recommend that she continue to be cautious with consumption of high-fat foods      6. Hypertriglyceridemia  Assessment & Plan:  Glyceride profile reviewed with the patient recommend decrease in consumption of concentrated sugars and excessive carbohydrates             Subjective      This 82year-old female patient returns to our office today for follow-up visit. She continues to experience significant arthritic issues in both of her knees as well as both of her shoulders.   Is been using Tylenol meloxicam and topical lidocaine patches. I indicated to the patient that since she can only utilize 1 lidocaine patch a day she can cut it in half and apply it to 2 separate joints in an effort to try to control her arthritis at 2 locations with this medication. Patient also notes that she has been having some vaginal irritation and discharge suggestive of a vaginal yeast infection and we will prescribe Diflucan to address the symptoms. We reviewed the patient's most recent blood work pertaining to her comprehensive metabolic profile CBC and lipid profile. We have advised her to be cautious with consumption of concentrated sugars as well as carbohydrates and high fat foods. She denies any recent cardiac symptoms such as chest pain palpitations or shortness of breath and she has had no recent infection symptoms. Review of Systems   Genitourinary:         Vaginal irritation with discharge   Musculoskeletal:  Positive for arthralgias, gait problem and joint swelling. All other systems reviewed and are negative.       Current Outpatient Medications on File Prior to Visit   Medication Sig    Acetaminophen Extra Strength 500 MG TABS TAKE 1 TABLET ORALLY TWICE DAILY FOR PAIN *NOT TO EXCEED 4GMS APAP/24HR*    Allergy Relief 10 MG tablet GIVE 1 TABLET ORALLY DAILY (ALLERGIES)    ALPRAZolam (XANAX) 0.25 mg tablet TAKE 1 TABLET ORALLY IN THE MORNING FOR ANXIETY (CONTROL) *REORDER*;TAKE 2 TABLETS (0.5MG) ORALLY AT BEDTIME FOR ANXIETY (CONTROL) *REORDER*    amLODIPine (NORVASC) 5 mg tablet TAKE 1 TABLET ORALLY DAILY (HYPERTENSION) *REORDER*    Aspirin Low Dose 81 MG chewable tablet CHEW 1 TABLET AND SWALLOW ORALLY THREE TIMES WEEKLY (CARDIAC DISEASE) *REORDER*    Chest Congestion Relief 100 MG/5ML oral liquid GIVE 10ML (200MG) ORALLY THREE TIMES DAILY AS NEEDED FOR COUGH *REORDER*    cyclopentolate (CYCLOGYL) 1 % ophthalmic solution INSTILL 1 DROP INTO RIGHT EYE THREE TIMES DAILY (DETACHED RENTINAL SURGERY) *REORDER*    famotidine (PEPCID) 20 mg tablet GIVE 1 TABLET ORALLY DAILY (GASTROESOPHAGEAL REFLUX DISEASE)    fluticasone (FLONASE) 50 mcg/act nasal spray SPRAY 1 SPRAY INTO EACH NOSTRIL TWICE A DAY    glycerin-hypromellose- (Artificial Tears) 0.2-0.2-1 % SOLN Administer 1 drop to both eyes 2 (two) times a day    ketoconazole (NIZORAL) 2 % shampoo     Lidocaine Pain Relief 4 % PTCH APPLY 1 PATCH TOPICALLY TO ONE SITE (TREAT SHOULDER OR KNEE PAIN) DAILY AND REMOVE IN 12 HOURS (PAIN) *REORDER*    lisinopril (ZESTRIL) 40 mg tablet GIVE 1 TABLET ORALLY DAILY (HYPERTENSION)    Lumigan 0.01 % ophthalmic drops     meloxicam (MOBIC) 7.5 mg tablet TAKE 1 TABLET ORALLY IN THE MORNING WITH MEAL    nystatin (MYCOSTATIN) powder APPLY TOPICALLY TO AFFECTED AREA TWICE DAILY (CANDIDIASIS) *REORDER*    Premarin vaginal cream APPLY TOPICALLY 1/4 INCH WITH FINGER TO VAGINA AT BEDTIME ON MONDAY AND FRIDAY (MENOPAUSAL TREATMENT) *REORDER*    Simbrinza 1-0.2 % SUSP     amLODIPine (NORVASC) 2.5 mg tablet Take 2 tablets (5 mg total) by mouth daily    ciclopirox (LOPROX) 0.77 % cream     ergocalciferol (VITAMIN D2) 50,000 units GIVE 1 CAPSULE ORALLY EVERY WEEK (SUPPLEMENT) (DO NOT CRUSH) *REORDER*    polymyxin b-trimethoprim (POLYTRIM) ophthalmic solution     prednisoLONE acetate (PRED FORTE) 1 % ophthalmic suspension INSTILL 1 DROP INTO RIGHT EYE FOUR TIMES A DAY RIGHT EYE ONLY, START 2 DAYS BEFORE SURGERY    Restasis 0.05 % ophthalmic emulsion INSTILL 1 DROP INTO EACH EYE TWICE DAILY FOR DRY EYE *BRAND PER INSURANCE* *REORDER*    [DISCONTINUED] aspirin 81 MG tablet Take 1 tablet by mouth daily 3 times a week       Objective     /70   Pulse 97   Temp (!) 96.8 °F (36 °C)   Ht 5' 4" (1.626 m)   Wt 82.2 kg (181 lb 3.2 oz)   SpO2 95%   BMI 31.10 kg/m²     Physical Exam  Vitals reviewed. Constitutional:       General: She is not in acute distress. Appearance: Normal appearance. She is well-developed. She is not ill-appearing. HENT:      Right Ear: Hearing and external ear normal.      Left Ear: Hearing and external ear normal.      Nose: Nose normal. No mucosal edema. Mouth/Throat:      Mouth: Mucous membranes are moist.      Pharynx: Oropharynx is clear. Uvula midline. Eyes:      General: Lids are normal.      Conjunctiva/sclera: Conjunctivae normal.      Pupils: Pupils are equal, round, and reactive to light. Neck:      Thyroid: No thyromegaly. Vascular: No carotid bruit or JVD. Cardiovascular:      Rate and Rhythm: Normal rate and regular rhythm. Heart sounds: Normal heart sounds. No murmur heard. Pulmonary:      Effort: Pulmonary effort is normal. No respiratory distress. Breath sounds: Normal breath sounds. No wheezing, rhonchi or rales. Abdominal:      General: Bowel sounds are normal.      Palpations: Abdomen is soft. Musculoskeletal:         General: Swelling, tenderness and deformity present. Normal range of motion. Right lower leg: No edema. Left lower leg: No edema. Lymphadenopathy:      Cervical: No cervical adenopathy. Skin:     General: Skin is warm and dry. Neurological:      Mental Status: She is alert and oriented to person, place, and time. Mental status is at baseline. Deep Tendon Reflexes: Reflexes are normal and symmetric. Reflexes normal.   Psychiatric:         Mood and Affect: Mood normal.         Speech: Speech normal.         Behavior: Behavior normal. Behavior is cooperative. Thought Content:  Thought content normal.         Judgment: Judgment normal.       Jake Powers MD

## 2023-10-25 NOTE — ASSESSMENT & PLAN NOTE
Lipid profile reviewed with the patient today does show elevation of total cholesterol and LDL they are gradually improving recommend that she continue to be cautious with consumption of high-fat foods

## 2023-11-11 NOTE — ASSESSMENT & PLAN NOTE
Hypertension assessment today confirms good blood pressure control recommend continuation of lisinopril at 40 mg daily and amlodipine at 5 mg daily  normal...

## 2023-11-14 DIAGNOSIS — R52 PAIN: ICD-10-CM

## 2023-11-14 RX ORDER — ACETAMINOPHEN 500 MG
TABLET ORAL
Qty: 60 TABLET | Refills: 11 | Status: SHIPPED | OUTPATIENT
Start: 2023-11-14

## 2023-11-17 DIAGNOSIS — K21.9 GASTROESOPHAGEAL REFLUX DISEASE WITHOUT ESOPHAGITIS: ICD-10-CM

## 2023-11-17 RX ORDER — FAMOTIDINE 20 MG/1
TABLET, FILM COATED ORAL
Qty: 30 TABLET | Refills: 11 | Status: SHIPPED | OUTPATIENT
Start: 2023-11-17

## 2023-11-21 NOTE — ASSESSMENT & PLAN NOTE
Blood pressure today is actually on the lower side the period we have decreased her amlodipine from 5 mg a day to 2 5 mg daily she will continue on her lisinopril at 40 mg daily  She did have a mildly orthostatic response to change in posture starting at [address] [employer] seated and 100/60 standing  12-Sep-2023

## 2023-12-08 DIAGNOSIS — T78.40XD ALLERGY, SUBSEQUENT ENCOUNTER: ICD-10-CM

## 2023-12-08 RX ORDER — FLUTICASONE PROPIONATE 50 MCG
SPRAY, SUSPENSION (ML) NASAL
Qty: 16 G | Refills: 11 | Status: SHIPPED | OUTPATIENT
Start: 2023-12-08

## 2023-12-20 ENCOUNTER — NURSE TRIAGE (OUTPATIENT)
Dept: OTHER | Facility: OTHER | Age: 88
End: 2023-12-20

## 2023-12-20 NOTE — TELEPHONE ENCOUNTER
Regarding: Having some rectal bleeding for the past 2 days  ----- Message from Elmer Delvalle sent at 12/20/2023  6:54 PM EST -----  '' I'm having some rectal bleeding for the past two days.''

## 2023-12-21 NOTE — TELEPHONE ENCOUNTER
Called patient and left her a message.  Asked her to call us back if issue is still unresolved.  I informed her that Dr Baeza is out of the office until 12/26.

## 2023-12-21 NOTE — TELEPHONE ENCOUNTER
"Reason for Disposition  • [1] Normal formed BM AND [2] few streaks or drops of blood on surface of BM    Answer Assessment - Initial Assessment Questions  1. APPEARANCE of BLOOD: \"What color is it?\" \"Is it passed separately, on the surface of the stool, or mixed in with the stool?\"       Bright red, just on toilet paper, a couple of drops    2. AMOUNT: \"How much blood was passed?\"       As above    3. FREQUENCY: \"How many times has blood been passed with the stools?\"       A couple of times in the past few days    4. ONSET: \"When was the blood first seen in the stools?\" (Days or weeks)       Within the past few days    5. DIARRHEA: \"Is there also some diarrhea?\" If Yes, ask: \"How many diarrhea stools were passed in past 24 hours?\"       no    6. CONSTIPATION: \"Do you have constipation?\" If Yes, ask: \"How bad is it?\"      no    7. RECURRENT SYMPTOMS: \"Have you had blood in your stools before?\" If Yes, ask: \"When was the last time?\" and \"What happened that time?\"       no    8. BLOOD THINNERS: \"Do you take any blood thinners?\" (e.g., Coumadin/warfarin, Pradaxa/dabigatran, aspirin)      No, ASA is on med list but patient denies taking.    9. OTHER SYMPTOMS: \"Do you have any other symptoms?\"  (e.g., abdominal pain, vomiting, dizziness, fever)      Denies abd pain, nausea vomiting diarrhea dizziness lighteheadedness, falls      Per patient the nurse at her skilled nursing/assisted living community has been trying to call the office for the last few days with no luck. No phone calls documented. Pt has correct number for practice, so encouraged her to share with staff at assisted living. Please also call patient to follow up on this tomorrow.    Protocols used: Rectal Bleeding-ADULT-AH    "

## 2023-12-23 ENCOUNTER — TELEPHONE (OUTPATIENT)
Dept: OTHER | Facility: OTHER | Age: 88
End: 2023-12-23

## 2023-12-23 NOTE — TELEPHONE ENCOUNTER
Olga from [facility] called regarding new rash on pt's right shoulder. It is warm to the touch and Olga noted it was concerning. It is bothersome to the pt as well. Paged on call provider via tc

## 2023-12-27 ENCOUNTER — TELEPHONE (OUTPATIENT)
Dept: INTERNAL MEDICINE CLINIC | Facility: CLINIC | Age: 88
End: 2023-12-27

## 2023-12-27 NOTE — TELEPHONE ENCOUNTER
Pt called and she is haven some problems that she will like to talk to you about ,she been having  problems with her shoulder which is all red ,the doctor that was on call recommend some blood work for her  .

## 2023-12-27 NOTE — TELEPHONE ENCOUNTER
Tigre Baeza,  Patient called and stated that she is having a rectal bleeding issue that was somewhat resolved but it is now bothering her again. Patient is asking if you could give her a call and she can be reached at: [phone]. Thank you.

## 2023-12-27 NOTE — TELEPHONE ENCOUNTER
Call was returned discussed situation with the patient's daughter.  Indicates that she has not had any further rectal bleeding at this time.  There is some irritation in the area of her shoulder she does have chronic arthritic issues in her shoulder.  On-call physician had ordered a uric acid level which is normal.  Family is concerned about what appears to be signs of dementia developing in the patient with more forgetfulness and difficulty expressing herself at times.

## 2023-12-28 DIAGNOSIS — E55.9 VITAMIN D DEFICIENCY: ICD-10-CM

## 2023-12-28 RX ORDER — ERGOCALCIFEROL 1.25 MG/1
50000 CAPSULE ORAL WEEKLY
Qty: 4 CAPSULE | Refills: 5 | Status: SHIPPED | OUTPATIENT
Start: 2023-12-28

## 2023-12-29 DIAGNOSIS — I10 ESSENTIAL HYPERTENSION: ICD-10-CM

## 2023-12-29 DIAGNOSIS — T78.40XA ALLERGY, INITIAL ENCOUNTER: ICD-10-CM

## 2023-12-29 RX ORDER — LISINOPRIL 40 MG/1
TABLET ORAL
Qty: 30 TABLET | Refills: 11 | Status: SHIPPED | OUTPATIENT
Start: 2023-12-29

## 2023-12-29 RX ORDER — LORATADINE 10 MG/1
TABLET ORAL
Qty: 30 TABLET | Refills: 11 | Status: SHIPPED | OUTPATIENT
Start: 2023-12-29

## 2024-01-10 DIAGNOSIS — F41.9 ANXIETY: ICD-10-CM

## 2024-01-10 RX ORDER — ALPRAZOLAM 0.25 MG/1
TABLET ORAL
Qty: 90 TABLET | Refills: 5 | Status: SHIPPED | OUTPATIENT
Start: 2024-01-10

## 2024-01-15 ENCOUNTER — OFFICE VISIT (OUTPATIENT)
Dept: INTERNAL MEDICINE CLINIC | Facility: CLINIC | Age: 89
End: 2024-01-15
Payer: MEDICARE

## 2024-01-15 VITALS
OXYGEN SATURATION: 89 % | HEIGHT: 64 IN | DIASTOLIC BLOOD PRESSURE: 72 MMHG | WEIGHT: 183.4 LBS | TEMPERATURE: 95.1 F | BODY MASS INDEX: 31.31 KG/M2 | HEART RATE: 95 BPM | SYSTOLIC BLOOD PRESSURE: 130 MMHG

## 2024-01-15 DIAGNOSIS — M25.511 RIGHT SHOULDER PAIN, UNSPECIFIED CHRONICITY: ICD-10-CM

## 2024-01-15 DIAGNOSIS — I10 PRIMARY HYPERTENSION: ICD-10-CM

## 2024-01-15 DIAGNOSIS — R21 RASH: ICD-10-CM

## 2024-01-15 DIAGNOSIS — M17.0 PRIMARY OSTEOARTHRITIS OF BOTH KNEES: ICD-10-CM

## 2024-01-15 DIAGNOSIS — K62.5 RECTAL BLEEDING: Primary | ICD-10-CM

## 2024-01-15 PROBLEM — M25.512 CHRONIC LEFT SHOULDER PAIN: Status: RESOLVED | Noted: 2020-11-05 | Resolved: 2024-01-15

## 2024-01-15 PROBLEM — G89.29 CHRONIC LEFT SHOULDER PAIN: Status: RESOLVED | Noted: 2020-11-05 | Resolved: 2024-01-15

## 2024-01-15 PROCEDURE — 99214 OFFICE O/P EST MOD 30 MIN: CPT | Performed by: INTERNAL MEDICINE

## 2024-01-15 RX ORDER — CALCIUM CARBONATE 500 MG/1
1 TABLET, CHEWABLE ORAL DAILY
COMMUNITY

## 2024-01-15 RX ORDER — MOMETASONE FUROATE 1 MG/G
CREAM TOPICAL DAILY
Qty: 45 G | Refills: 1 | Status: SHIPPED | OUTPATIENT
Start: 2024-01-15

## 2024-01-15 NOTE — PROGRESS NOTES
Name: Denisse Jolly      : 1935      MRN: [id number]  Encounter Provider: Dustin Baeza MD  Encounter Date: 1/15/2024   Encounter department: [facility] INTERNAL MEDICINE    Assessment & Plan     1. Rash  Assessment & Plan:  Rash of right shoulder reviewed appears to be some type of skin irritation possibly from her lidocaine patch a mometasone application on a daily basis has been advised until resolution of rash    Orders:  -     mometasone (ELOCON) 0.1 % cream; Apply topically daily Apply daily at bedtime    2. Right shoulder pain, unspecified chronicity  -     XR shoulder 2+ vw right; Future; Expected date: 01/15/2024    3. Primary hypertension  Assessment & Plan:  Pretension assessment today confirms good control recommend the continuation of current therapy      4. Primary osteoarthritis of both knees  Assessment & Plan:  Arthritis of both knees worse on the left side recommend meloxicam 7.5 mg daily and as needed use of lidocaine patch.      5. Rectal bleeding  Assessment & Plan:  Small amount of rectal bleeding noted after bowel movement earlier today not clear if the blood was mixed in with the stool.  Did appear to be rather bright red indicating probable low colon or rectal issue.  She had 1 other episode similar to this several weeks ago.  No abdominal pain.  Does tend to have constipated stools recommend increasing fiber in the diet if rectal bleeding continues to occur consideration for colonoscopy.             Subjective      This 88-year-old female patient returns today for a routine follow-up visit.  She expresses today that after a was bowel movement she had a small amount of rectal bleeding.  This is occurred at least 1 other time after straining to move her bowels.  Indicates that most of her bowel movements appear normal brown color.  She does have a tendency toward constipated stools.    Patient also has a history of arthritic pain of her right shoulder as well as  left knee.  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  Updated x-rays have been requested.    We also noted a mild rash of the right shoulder appears to be skin irritation more than anything else and may be the result of her lidocaine patches.  Topical mometasone has been recommended.      Review of Systems   Gastrointestinal:  Positive for blood in stool and constipation.   Musculoskeletal:  Positive for arthralgias.        Right shoulder left knee pain   All other systems reviewed and are negative.      Current Outpatient Medications on File Prior to Visit   Medication Sig    acetaminophen (TYLENOL) 500 mg tablet TAKE 2 CAPLETS (1000MG) ORALLY THREE TIMES DAILY AS NEEDED FOR PAIN *NOT TO EXCEED 4GMS APAP/24HR* *REORDER*    Allergy Relief 10 MG tablet GIVE 1 TABLET ORALLY DAILY (ALLERGIES)    ALPRAZolam (XANAX) 0.25 mg tablet TAKE 1 TABLET ORALLY IN THE MORNING FOR ANXIETY (CONTROL) *REORDER*;TAKE 2 TABLETS (0.5MG) ORALLY AT BEDTIME FOR ANXIETY (CONTROL) *REORDER*    amLODIPine (NORVASC) 5 mg tablet TAKE 1 TABLET ORALLY DAILY (HYPERTENSION) *REORDER*    Aspirin Low Dose 81 MG chewable tablet CHEW 1 TABLET AND SWALLOW ORALLY THREE TIMES WEEKLY (CARDIAC DISEASE) *REORDER*    calcium carbonate (TUMS) 500 mg chewable tablet Chew 1 tablet daily    Chest Congestion Relief 100 MG/5ML oral liquid GIVE 10ML (200MG) ORALLY THREE TIMES DAILY AS NEEDED FOR COUGH *REORDER*    cyclopentolate (CYCLOGYL) 1 % ophthalmic solution INSTILL 1 DROP INTO RIGHT EYE THREE TIMES DAILY (DETACHED RENTINAL SURGERY) *REORDER*    ergocalciferol (VITAMIN D2) 50,000 units Take 1 capsule (50,000 Units total) by mouth once a week    famotidine (PEPCID) 20 mg tablet GIVE 1 TABLET ORALLY DAILY (GASTROESOPHAGEAL REFLUX DISEASE)    ketoconazole (NIZORAL) 2 % shampoo     Lidocaine Pain Relief 4 % PTCH APPLY 1 PATCH TOPICALLY TO ONE SITE (TREAT SHOULDER  "OR KNEE PAIN) DAILY AND REMOVE IN 12 HOURS (PAIN) *REORDER*    lisinopril (ZESTRIL) 40 mg tablet GIVE 1 TABLET ORALLY DAILY (HYPERTENSION)    Lumigan 0.01 % ophthalmic drops     meloxicam (MOBIC) 7.5 mg tablet TAKE 1 TABLET ORALLY IN THE MORNING WITH MEAL    nystatin (MYCOSTATIN) powder APPLY TOPICALLY TO AFFECTED AREA TWICE DAILY (CANDIDIASIS) *REORDER*    Premarin vaginal cream APPLY TOPICALLY 1/4 INCH WITH FINGER TO VAGINA AT BEDTIME ON MONDAY AND FRIDAY (MENOPAUSAL TREATMENT) *REORDER*    Restasis 0.05 % ophthalmic emulsion INSTILL 1 DROP INTO EACH EYE TWICE DAILY FOR DRY EYE *BRAND PER INSURANCE* *REORDER*    Simbrinza 1-0.2 % SUSP     ciclopirox (LOPROX) 0.77 % cream     [DISCONTINUED] amLODIPine (NORVASC) 2.5 mg tablet Take 2 tablets (5 mg total) by mouth daily (Patient not taking: Reported on 1/15/2024)    [DISCONTINUED] fluticasone (FLONASE) 50 mcg/act nasal spray INHALE 1 SPRAY INTO EACH NOSTRIL TWICE DAILY (ALLERGIES) *REORDER* (Patient not taking: Reported on 1/15/2024)    [DISCONTINUED] glycerin-hypromellose- (Artificial Tears) 0.2-0.2-1 % SOLN Administer 1 drop to both eyes 2 (two) times a day (Patient not taking: Reported on 1/15/2024)    [DISCONTINUED] polymyxin b-trimethoprim (POLYTRIM) ophthalmic solution  (Patient not taking: Reported on 1/15/2024)    [DISCONTINUED] prednisoLONE acetate (PRED FORTE) 1 % ophthalmic suspension INSTILL 1 DROP INTO RIGHT EYE FOUR TIMES A DAY RIGHT EYE ONLY, START 2 DAYS BEFORE SURGERY (Patient not taking: Reported on 1/15/2024)       Objective     /72   Pulse 95   Temp (!) 95.1 °F (35.1 °C)   Ht 5' 4\" (1.626 m)   Wt 83.2 kg (183 lb 6.4 oz)   SpO2 (!) 89%   BMI 31.48 kg/m²     Physical Exam  Vitals reviewed.   Constitutional:       General: She is not in acute distress.     Appearance: Normal appearance. She is well-developed. She is not ill-appearing.   HENT:      Right Ear: Hearing and external ear normal.      Left Ear: Hearing and external ear " normal.      Nose: Nose normal. No mucosal edema.      Mouth/Throat:      Mouth: Mucous membranes are moist.      Pharynx: Oropharynx is clear. Uvula midline.   Eyes:      General: Lids are normal.      Conjunctiva/sclera: Conjunctivae normal.      Pupils: Pupils are equal, round, and reactive to light.   Neck:      Thyroid: No thyromegaly.      Vascular: No carotid bruit or JVD.   Cardiovascular:      Rate and Rhythm: Normal rate and regular rhythm.      Heart sounds: Normal heart sounds. No murmur heard.  Pulmonary:      Effort: Pulmonary effort is normal. No respiratory distress.      Breath sounds: Normal breath sounds. No wheezing, rhonchi or rales.   Abdominal:      General: Bowel sounds are normal.      Palpations: Abdomen is soft.   Musculoskeletal:         General: Tenderness present. Normal range of motion.      Right lower leg: No edema.      Left lower leg: No edema.   Lymphadenopathy:      Cervical: No cervical adenopathy.   Skin:     General: Skin is warm and dry.      Comments: Skin irritation on the shoulder right side   Neurological:      Mental Status: She is alert and oriented to person, place, and time. Mental status is at baseline.      Deep Tendon Reflexes: Reflexes are normal and symmetric. Reflexes normal.   Psychiatric:         Mood and Affect: Mood normal.         Speech: Speech normal.         Behavior: Behavior normal. Behavior is cooperative.         Thought Content: Thought content normal.         Judgment: Judgment normal.       Dustin Baeza MD

## 2024-01-15 NOTE — ASSESSMENT & PLAN NOTE
Arthritic changes to the right shoulder previous x-rays suggest possible early avascular necrosis updated x-ray has been requested.  Continue lidocaine patch RN for pain control along with meloxicam 7.5 mg daily

## 2024-01-15 NOTE — ASSESSMENT & PLAN NOTE
Rash of right shoulder reviewed appears to be some type of skin irritation possibly from her lidocaine patch a mometasone application on a daily basis has been advised until resolution of rash

## 2024-01-15 NOTE — ASSESSMENT & PLAN NOTE
Small amount of rectal bleeding noted after bowel movement earlier today not clear if the blood was mixed in with the stool.  Did appear to be rather bright red indicating probable low colon or rectal issue.  She had 1 other episode similar to this several weeks ago.  No abdominal pain.  Does tend to have constipated stools recommend increasing fiber in the diet if rectal bleeding continues to occur consideration for colonoscopy.

## 2024-01-15 NOTE — ASSESSMENT & PLAN NOTE
Arthritis of both knees worse on the left side recommend meloxicam 7.5 mg daily and as needed use of lidocaine patch.

## 2024-01-26 DIAGNOSIS — M19.90 ARTHRITIS: ICD-10-CM

## 2024-01-26 RX ORDER — LIDOCAINE PAIN RELIEF 40 MG/1000MG
PATCH TOPICAL
Qty: 60 PATCH | Refills: 11 | Status: SHIPPED | OUTPATIENT
Start: 2024-01-26

## 2024-02-07 DIAGNOSIS — R68.2 DRY MOUTH: Primary | ICD-10-CM

## 2024-02-07 RX ORDER — FLUORIDE TOOTHPASTE
TOOTHPASTE DENTAL
Qty: 473 ML | Refills: 11 | Status: SHIPPED | OUTPATIENT
Start: 2024-02-07

## 2024-02-12 DIAGNOSIS — M19.90 ARTHRITIS: Primary | ICD-10-CM

## 2024-02-12 RX ORDER — ACETAMINOPHEN 500 MG
1000 TABLET ORAL EVERY 8 HOURS
Qty: 180 TABLET | Refills: 5 | Status: SHIPPED | OUTPATIENT
Start: 2024-02-12

## 2024-02-21 ENCOUNTER — OFFICE VISIT (OUTPATIENT)
Dept: INTERNAL MEDICINE CLINIC | Facility: CLINIC | Age: 89
End: 2024-02-21
Payer: MEDICARE

## 2024-02-21 VITALS
RESPIRATION RATE: 15 BRPM | OXYGEN SATURATION: 97 % | HEART RATE: 85 BPM | SYSTOLIC BLOOD PRESSURE: 128 MMHG | DIASTOLIC BLOOD PRESSURE: 72 MMHG | TEMPERATURE: 98.4 F

## 2024-02-21 DIAGNOSIS — M24.811 INTERNAL DERANGEMENT OF SHOULDER, RIGHT: ICD-10-CM

## 2024-02-21 DIAGNOSIS — T78.40XA ALLERGIC RASH PRESENT ON EXAMINATION: Primary | ICD-10-CM

## 2024-02-21 DIAGNOSIS — I10 PRIMARY HYPERTENSION: ICD-10-CM

## 2024-02-21 DIAGNOSIS — R21 RASH: ICD-10-CM

## 2024-02-21 DIAGNOSIS — J34.89 DRY NOSE: ICD-10-CM

## 2024-02-21 PROCEDURE — 99214 OFFICE O/P EST MOD 30 MIN: CPT | Performed by: INTERNAL MEDICINE

## 2024-02-21 RX ORDER — PREDNISONE 5 MG/1
TABLET ORAL
Qty: 42 TABLET | Refills: 0 | Status: SHIPPED | OUTPATIENT
Start: 2024-02-21

## 2024-02-21 RX ORDER — MOMETASONE FUROATE 1 MG/G
CREAM TOPICAL
Qty: 45 G | Refills: 11 | Status: SHIPPED | OUTPATIENT
Start: 2024-02-21

## 2024-02-21 NOTE — ASSESSMENT & PLAN NOTE
Patient has what appears to be a well-demarcated rash of the right shoulder likely secondary to topical agents being applied to the shoulder for pain relief for her chronic arthritis.  I advised against applying any further lidocaine patches or creams or lotions to this area.  I have started her on a tapering dose of steroids to try to calm down the allergic dermatitis reaction she will start at 30 mg and decrease by 5 mg every 2 days.  A 2-week follow-up visit to reassess the rash and shoulder pain has been requested

## 2024-02-21 NOTE — PROGRESS NOTES
Name: Denisse Jolly      : 1935      MRN: [id number]  Encounter Provider: Dustin Baeza MD  Encounter Date: 2024   Encounter department: [facility] INTERNAL MEDICINE    Assessment & Plan     1. Primary hypertension  Assessment & Plan:  Pressure assessment confirms good control recommend continuation of lisinopril at 40 mg daily      2. 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  Not a good candidate for surgery given her advanced age.  Will continue with medication including nonsteroidal anti-inflammatory meloxicam 7.5 mg daily.  Advised the patient to stop application of lidocaine patch to the shoulder area given rash that she is expressing.      3. Allergic rash present on examination  Assessment & Plan:  Patient has what appears to be a well-demarcated rash of the right shoulder likely secondary to topical agents being applied to the shoulder for pain relief for her chronic arthritis.  I advised against applying any further lidocaine patches or creams or lotions to this area.  I have started her on a tapering dose of steroids to try to calm down the allergic dermatitis reaction she will start at 30 mg and decrease by 5 mg every 2 days.  A 2-week follow-up visit to reassess the rash and shoulder pain has been requested    Orders:  -     predniSONE 5 mg tablet; Take 6 pills daily with food and decrease by 1 pill every 2 days    4. Dry nose  -     sodium chloride (OCEAN) 0.65 % nasal spray; 1 spray into each nostril 3 (three) times a day           Subjective      This 88-year-old female patient presents today for evaluation of persistent pain in her right shoulder.  She also has a significant red rash in the area of her shoulder rashes in the region of topical cream and lotion  applications the patient has been using.  Including the use of lidocaine patch.    Patient also has been experiencing a dry sensation in her nasal passages for the past several months since the heat was turned on she does have a humidifier in her apartment.    She remarks that she has been experiencing bilateral axillary irritation of the skin.      Review of Systems   HENT:          Dry sinus sensation   Musculoskeletal:  Positive for arthralgias.        Severe right shoulder pain with decreased range of motion of the shoulder   Skin:  Positive for rash.   All other systems reviewed and are negative.      Current Outpatient Medications on File Prior to Visit   Medication Sig    acetaminophen (TYLENOL) 500 mg tablet Take 2 tablets (1,000 mg total) by mouth every 8 (eight) hours    Allergy Relief 10 MG tablet GIVE 1 TABLET ORALLY DAILY (ALLERGIES)    ALPRAZolam (XANAX) 0.25 mg tablet TAKE 1 TABLET ORALLY IN THE MORNING FOR ANXIETY (CONTROL) *REORDER*;TAKE 2 TABLETS (0.5MG) ORALLY AT BEDTIME FOR ANXIETY (CONTROL) *REORDER*    amLODIPine (NORVASC) 5 mg tablet TAKE 1 TABLET ORALLY DAILY (HYPERTENSION) *REORDER*    Aspirin Low Dose 81 MG chewable tablet CHEW 1 TABLET AND SWALLOW ORALLY THREE TIMES WEEKLY (CARDIAC DISEASE) *REORDER*    calcium carbonate (TUMS) 500 mg chewable tablet Chew 1 tablet daily    Chest Congestion Relief 100 MG/5ML oral liquid GIVE 10ML (200MG) ORALLY THREE TIMES DAILY AS NEEDED FOR COUGH *REORDER*    ciclopirox (LOPROX) 0.77 % cream     cyclopentolate (CYCLOGYL) 1 % ophthalmic solution INSTILL 1 DROP INTO RIGHT EYE THREE TIMES DAILY (DETACHED RENTINAL SURGERY) *REORDER*    ergocalciferol (VITAMIN D2) 50,000 units Take 1 capsule (50,000 Units total) by mouth once a week    famotidine (PEPCID) 20 mg tablet GIVE 1 TABLET ORALLY DAILY (GASTROESOPHAGEAL REFLUX DISEASE)    ketoconazole (NIZORAL) 2 % shampoo     Lidocaine (Lidocaine Pain Relief) 4 % PTCH APPLY 1/2 PATCH TOPICALLY TO EACH KNEE (PAIN)  REMOVE AFTER 12 HOURS *REORDER*;APPLY 1 PATCH TOPICALLY TO RIGHT SHOULDER (PAIN) REMOVE AFTER 12 HOURS *REORDER*    lisinopril (ZESTRIL) 40 mg tablet GIVE 1 TABLET ORALLY DAILY (HYPERTENSION)    Lumigan 0.01 % ophthalmic drops     meloxicam (MOBIC) 7.5 mg tablet TAKE 1 TABLET ORALLY IN THE MORNING WITH MEAL    Mouthwashes (Biotene Dry Mouth) LIQD USE TO RINSE MOUTH TWICE DAILY FOR DRY MOUTH *REORDER*    nystatin (MYCOSTATIN) powder APPLY TOPICALLY TO AFFECTED AREA TWICE DAILY (CANDIDIASIS) *REORDER*    Premarin vaginal cream APPLY TOPICALLY 1/4 INCH WITH FINGER TO VAGINA AT BEDTIME ON MONDAY AND FRIDAY (MENOPAUSAL TREATMENT) *REORDER*    Restasis 0.05 % ophthalmic emulsion INSTILL 1 DROP INTO EACH EYE TWICE DAILY FOR DRY EYE *BRAND PER INSURANCE* *REORDER*    Simbrinza 1-0.2 % SUSP     [DISCONTINUED] mometasone (ELOCON) 0.1 % cream Apply topically daily Apply daily at bedtime       Objective     /72   Pulse 85   Temp 98.4 °F (36.9 °C)   Resp 15   SpO2 97%     Physical Exam  Constitutional:       General: She is not in acute distress.     Appearance: Normal appearance. She is not ill-appearing.   HENT:      Head: Normocephalic.      Nose:      Comments: Nasal examination reveals dry membranes bilaterally     Mouth/Throat:      Pharynx: Oropharynx is clear.   Eyes:      Pupils: Pupils are equal, round, and reactive to light.   Neck:      Vascular: No carotid bruit.   Cardiovascular:      Rate and Rhythm: Regular rhythm.      Heart sounds: Normal heart sounds.   Pulmonary:      Breath sounds: No wheezing, rhonchi or rales.   Musculoskeletal:      Cervical back: Normal range of motion and neck supple. No rigidity or tenderness.      Right lower leg: No edema.      Left lower leg: No edema.   Lymphadenopathy:      Cervical: No cervical adenopathy.   Skin:     Findings: Rash present.      Comments: Rash of the right shoulder area ears to be some type of contact dermatitis possibly from creams and lotions that  the patient has used to try to relieve the shoulder pain.   Neurological:      Mental Status: She is alert. Mental status is at baseline.   Psychiatric:         Mood and Affect: Mood normal.         Behavior: Behavior normal.         Thought Content: Thought content normal.         Judgment: Judgment normal.       Dustin Baeza MD     No

## 2024-02-21 NOTE — ASSESSMENT & PLAN NOTE
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  Not a good candidate for surgery given her advanced age.  Will continue with medication including nonsteroidal anti-inflammatory meloxicam 7.5 mg daily.  Advised the patient to stop application of lidocaine patch to the shoulder area given rash that she is expressing.

## 2024-03-06 DIAGNOSIS — B37.2 YEAST INFECTION OF THE SKIN: ICD-10-CM

## 2024-03-06 RX ORDER — NYSTATIN 100000 [USP'U]/G
POWDER TOPICAL
Qty: 30 G | Refills: 11 | Status: SHIPPED | OUTPATIENT
Start: 2024-03-06

## 2024-03-08 ENCOUNTER — OFFICE VISIT (OUTPATIENT)
Dept: INTERNAL MEDICINE CLINIC | Facility: CLINIC | Age: 89
End: 2024-03-08
Payer: MEDICARE

## 2024-03-08 VITALS
OXYGEN SATURATION: 98 % | HEART RATE: 90 BPM | TEMPERATURE: 96.3 F | WEIGHT: 183 LBS | BODY MASS INDEX: 31.24 KG/M2 | DIASTOLIC BLOOD PRESSURE: 76 MMHG | HEIGHT: 64 IN | SYSTOLIC BLOOD PRESSURE: 130 MMHG

## 2024-03-08 DIAGNOSIS — R21 RASH: Primary | ICD-10-CM

## 2024-03-08 DIAGNOSIS — M19.90 ARTHRITIS: ICD-10-CM

## 2024-03-08 DIAGNOSIS — M24.811 INTERNAL DERANGEMENT OF SHOULDER, RIGHT: ICD-10-CM

## 2024-03-08 DIAGNOSIS — I10 PRIMARY HYPERTENSION: ICD-10-CM

## 2024-03-08 PROCEDURE — 99214 OFFICE O/P EST MOD 30 MIN: CPT | Performed by: INTERNAL MEDICINE

## 2024-03-08 PROCEDURE — G2211 COMPLEX E/M VISIT ADD ON: HCPCS | Performed by: INTERNAL MEDICINE

## 2024-03-08 RX ORDER — PREDNISONE 10 MG/1
10 TABLET ORAL DAILY
Qty: 14 TABLET | Refills: 0 | Status: SHIPPED | OUTPATIENT
Start: 2024-03-08 | End: 2024-03-14

## 2024-03-08 NOTE — ASSESSMENT & PLAN NOTE
Arthritic condition of the right shoulder reviewed with the patient she has limited range of motion without any pain any significant motion does elicit pain.  Swelling in the area of the shoulder is consistent with the arthritic process.

## 2024-03-08 NOTE — PROGRESS NOTES
Name: Denisse Jolly      : 1935      MRN: [id number]  Encounter Provider: Dustin Baeza MD  Encounter Date: 3/8/2024   Encounter department: [facility] INTERNAL MEDICINE    Assessment & Plan     1. Rash  Assessment & Plan:  Rash in the area of the right shoulder persists has slightly decreased in intensity after steroid course.  Still some swelling in the soft tissue no warmth emanating from the area of the rash to indicate infection.  Will try low-dose steroids for 2 weeks 10 mg of prednisone daily    Orders:  -     predniSONE 10 mg tablet; Take 1 tablet (10 mg total) by mouth daily Take with meal    2. Arthritis  -     predniSONE 10 mg tablet; Take 1 tablet (10 mg total) by mouth daily Take with meal    3. Primary hypertension  Assessment & Plan:  Blood pressure assessment today confirms adequate control of hypertension recommend continuation of lisinopril at 40 mg daily and amlodipine at 5 mg daily      4. Internal derangement of shoulder, right  Assessment & Plan:  Arthritic condition of the right shoulder reviewed with the patient she has limited range of motion without any pain any significant motion does elicit pain.  Swelling in the area of the shoulder is consistent with the arthritic process.             Subjective      This 88-year-old female patient returns today for follow-up assessment of right shoulder pain and irritation and inflammation of the skin in the area of the right shoulder.  On her last visit we discontinued use of lidocaine medication to the shoulder region concerned that it may have been a factor in the irritation to her skin.  She was placed on a tapering dose of steroid medication starting at 30 mg of prednisone daily decrease by 5 mg every 2 days.  There is a definite decrease in the intensity of the redness of the skin.  There is some localized swelling in the area of the shoulder and we do know that she has advanced arthritic changes in the shoulder.   Arthritic pain may be somewhat improved since the initiation of steroid therapy.      Review of Systems   Musculoskeletal:  Positive for arthralgias.        Arthritic discomfort in the right shoulder exacerbated by movement of the shoulder or arm   Skin:  Positive for rash.   All other systems reviewed and are negative.      Current Outpatient Medications on File Prior to Visit   Medication Sig    acetaminophen (TYLENOL) 500 mg tablet Take 2 tablets (1,000 mg total) by mouth every 8 (eight) hours    Allergy Relief 10 MG tablet GIVE 1 TABLET ORALLY DAILY (ALLERGIES)    ALPRAZolam (XANAX) 0.25 mg tablet TAKE 1 TABLET ORALLY IN THE MORNING FOR ANXIETY (CONTROL) *REORDER*;TAKE 2 TABLETS (0.5MG) ORALLY AT BEDTIME FOR ANXIETY (CONTROL) *REORDER*    amLODIPine (NORVASC) 5 mg tablet TAKE 1 TABLET ORALLY DAILY (HYPERTENSION) *REORDER*    Aspirin Low Dose 81 MG chewable tablet CHEW 1 TABLET AND SWALLOW ORALLY THREE TIMES WEEKLY (CARDIAC DISEASE) *REORDER*    calcium carbonate (TUMS) 500 mg chewable tablet Chew 1 tablet daily    Chest Congestion Relief 100 MG/5ML oral liquid GIVE 10ML (200MG) ORALLY THREE TIMES DAILY AS NEEDED FOR COUGH *REORDER*    ciclopirox (LOPROX) 0.77 % cream     cyclopentolate (CYCLOGYL) 1 % ophthalmic solution INSTILL 1 DROP INTO RIGHT EYE THREE TIMES DAILY (DETACHED RENTINAL SURGERY) *REORDER*    ergocalciferol (VITAMIN D2) 50,000 units Take 1 capsule (50,000 Units total) by mouth once a week    famotidine (PEPCID) 20 mg tablet GIVE 1 TABLET ORALLY DAILY (GASTROESOPHAGEAL REFLUX DISEASE)    ketoconazole (NIZORAL) 2 % shampoo     Lidocaine (Lidocaine Pain Relief) 4 % PTCH APPLY 1/2 PATCH TOPICALLY TO EACH KNEE (PAIN) REMOVE AFTER 12 HOURS *REORDER*;APPLY 1 PATCH TOPICALLY TO RIGHT SHOULDER (PAIN) REMOVE AFTER 12 HOURS *REORDER*    lisinopril (ZESTRIL) 40 mg tablet GIVE 1 TABLET ORALLY DAILY (HYPERTENSION)    Lumigan 0.01 % ophthalmic drops     meloxicam (MOBIC) 7.5 mg tablet TAKE 1 TABLET ORALLY IN THE  "MORNING WITH MEAL    mometasone (ELOCON) 0.1 % cream APPLY TOPICALLY TO AFFECTED AREA AT BEDTIME (SKIN RASH) *REORDER*    Mouthwashes (Biotene Dry Mouth) LIQD USE TO RINSE MOUTH TWICE DAILY FOR DRY MOUTH *REORDER*    nystatin (MYCOSTATIN) powder APPLY TOPICALLY UNDER BOTH ARMS TWICE DAILY (REDNESS) *REORDER*    Premarin vaginal cream APPLY TOPICALLY 1/4 INCH WITH FINGER TO VAGINA AT BEDTIME ON MONDAY AND FRIDAY (MENOPAUSAL TREATMENT) *REORDER*    Restasis 0.05 % ophthalmic emulsion INSTILL 1 DROP INTO EACH EYE TWICE DAILY FOR DRY EYE *BRAND PER INSURANCE* *REORDER*    Simbrinza 1-0.2 % SUSP     sodium chloride (OCEAN) 0.65 % nasal spray 1 spray into each nostril 3 (three) times a day    [DISCONTINUED] predniSONE 5 mg tablet Take 6 pills daily with food and decrease by 1 pill every 2 days       Objective     /76   Pulse 90   Temp (!) 96.3 °F (35.7 °C)   Ht 5' 4\" (1.626 m)   Wt 83 kg (183 lb)   SpO2 98%   BMI 31.41 kg/m²     Physical Exam  Constitutional:       General: She is not in acute distress.     Appearance: Normal appearance. She is not ill-appearing.   HENT:      Head: Normocephalic.   Eyes:      Pupils: Pupils are equal, round, and reactive to light.   Cardiovascular:      Rate and Rhythm: Regular rhythm.      Heart sounds: Normal heart sounds.   Pulmonary:      Breath sounds: No wheezing, rhonchi or rales.   Musculoskeletal:         General: Swelling and tenderness present.      Right lower leg: No edema.      Left lower leg: No edema.   Skin:     Findings: Erythema and rash present.   Neurological:      Mental Status: She is alert. Mental status is at baseline.   Psychiatric:         Mood and Affect: Mood normal.         Behavior: Behavior normal.         Thought Content: Thought content normal.         Judgment: Judgment normal.       Dustin Baeza MD    "

## 2024-03-08 NOTE — ASSESSMENT & PLAN NOTE
Blood pressure assessment today confirms adequate control of hypertension recommend continuation of lisinopril at 40 mg daily and amlodipine at 5 mg daily

## 2024-03-08 NOTE — ASSESSMENT & PLAN NOTE
Rash in the area of the right shoulder persists has slightly decreased in intensity after steroid course.  Still some swelling in the soft tissue no warmth emanating from the area of the rash to indicate infection.  Will try low-dose steroids for 2 weeks 10 mg of prednisone daily

## 2024-03-13 DIAGNOSIS — R39.9 UTI SYMPTOMS: Primary | ICD-10-CM

## 2024-03-14 ENCOUNTER — TELEPHONE (OUTPATIENT)
Dept: INTERNAL MEDICINE CLINIC | Facility: CLINIC | Age: 89
End: 2024-03-14

## 2024-03-14 NOTE — TELEPHONE ENCOUNTER
I have notified the patient's [place] [facility] to discontinue the prednisone medication also have a quick note to fax back to [facility].  Thank you

## 2024-03-14 NOTE — TELEPHONE ENCOUNTER
Pt called stating that the current steroid she is taking is causing her to have side effects....wish to discontinue it.

## 2024-03-25 ENCOUNTER — OFFICE VISIT (OUTPATIENT)
Dept: INTERNAL MEDICINE CLINIC | Facility: CLINIC | Age: 89
End: 2024-03-25
Payer: MEDICARE

## 2024-03-25 VITALS
HEIGHT: 64 IN | OXYGEN SATURATION: 98 % | WEIGHT: 183 LBS | RESPIRATION RATE: 15 BRPM | SYSTOLIC BLOOD PRESSURE: 122 MMHG | HEART RATE: 84 BPM | BODY MASS INDEX: 31.24 KG/M2 | DIASTOLIC BLOOD PRESSURE: 68 MMHG

## 2024-03-25 DIAGNOSIS — K62.5 RECTAL BLEEDING: ICD-10-CM

## 2024-03-25 DIAGNOSIS — I10 PRIMARY HYPERTENSION: ICD-10-CM

## 2024-03-25 DIAGNOSIS — M17.0 PRIMARY OSTEOARTHRITIS OF BOTH KNEES: ICD-10-CM

## 2024-03-25 DIAGNOSIS — R21 RASH: ICD-10-CM

## 2024-03-25 DIAGNOSIS — M75.101 ROTATOR CUFF TEAR ARTHROPATHY OF RIGHT SHOULDER: Primary | ICD-10-CM

## 2024-03-25 DIAGNOSIS — M12.811 ROTATOR CUFF TEAR ARTHROPATHY OF RIGHT SHOULDER: Primary | ICD-10-CM

## 2024-03-25 PROCEDURE — G2211 COMPLEX E/M VISIT ADD ON: HCPCS | Performed by: INTERNAL MEDICINE

## 2024-03-25 PROCEDURE — 99214 OFFICE O/P EST MOD 30 MIN: CPT | Performed by: INTERNAL MEDICINE

## 2024-03-25 NOTE — ASSESSMENT & PLAN NOTE
Blood pressure assessment confirms good control I recommend the continuation of current blood pressure medication lisinopril at 40 mg daily

## 2024-03-25 NOTE — ASSESSMENT & PLAN NOTE
Rash of the right shoulder is improving nicely.  Intensity of the rash has decreased significantly since last visit.

## 2024-03-25 NOTE — ASSESSMENT & PLAN NOTE
Advanced arthritis of both knees causing ambulatory issues for the patient.  She ambulates today with a walker reports no recent falls.  Continue use of lidocaine patch half a patch to each knee for 12 hours a day

## 2024-03-25 NOTE — PROGRESS NOTES
Name: Denisse Jolly      : 1935      MRN: [id number]  Encounter Provider: Dustin Baeza MD  Encounter Date: 3/25/2024   Encounter department: [facility] INTERNAL MEDICINE    Assessment & Plan     1. Primary hypertension  Assessment & Plan:  Blood pressure assessment confirms good control I recommend the continuation of current blood pressure medication lisinopril at 40 mg daily      2. Rectal bleeding  Assessment & Plan:  She reports no further episodes of rectal bleeding since her last visit      3. Rotator cuff tear arthropathy of right shoulder  Assessment & Plan:  Rotator cuff injury to the right shoulder along with advanced arthritis of the right shoulder not a good candidate at 88 going on 89 for shoulder surgery.  The patient understands and agrees that the best approach is to live with the symptoms that she has using Tylenol 1000 mg every 8 hours to try to control her pain.  Also continue meloxicam at 7.5 mg daily      4. Primary osteoarthritis of both knees  Assessment & Plan:  Advanced arthritis of both knees causing ambulatory issues for the patient.  She ambulates today with a walker reports no recent falls.  Continue use of lidocaine patch half a patch to each knee for 12 hours a day      5. Rash  Assessment & Plan:  Rash of the right shoulder is improving nicely.  Intensity of the rash has decreased significantly since last visit.             Subjective      This 88-year-old female returns today for follow-up assessment.  At the time of her last visit with she was placed on prednisone 10 mg daily to see if it would be beneficial at relieving the flushing rash that she had around her right shoulder as well as decrease her daily arthritic symptoms.  She did not tolerate the prednisone well showing some signs of perhaps some confusion on the medication.  After several days trial she had stopped the medication.  She returns today indicating that her arthritic symptoms in her  shoulder and both knees are stable at the present time.  She continues to use Tylenol on a daily basis for pain control and also uses a lidocaine patch half a patch on each knee.  The rash that she had about her right shoulder has improved dramatically since last visit The several days of prednisone were beneficial at helping to reduce her skin irritation.      Review of Systems   Musculoskeletal:  Positive for arthralgias and gait problem.        Arthritic type pain in both knees as well as right shoulder   All other systems reviewed and are negative.      Current Outpatient Medications on File Prior to Visit   Medication Sig    acetaminophen (TYLENOL) 500 mg tablet Take 2 tablets (1,000 mg total) by mouth every 8 (eight) hours    Allergy Relief 10 MG tablet GIVE 1 TABLET ORALLY DAILY (ALLERGIES)    ALPRAZolam (XANAX) 0.25 mg tablet TAKE 1 TABLET ORALLY IN THE MORNING FOR ANXIETY (CONTROL) *REORDER*;TAKE 2 TABLETS (0.5MG) ORALLY AT BEDTIME FOR ANXIETY (CONTROL) *REORDER*    amLODIPine (NORVASC) 5 mg tablet TAKE 1 TABLET ORALLY DAILY (HYPERTENSION) *REORDER*    Aspirin Low Dose 81 MG chewable tablet CHEW 1 TABLET AND SWALLOW ORALLY THREE TIMES WEEKLY (CARDIAC DISEASE) *REORDER*    calcium carbonate (TUMS) 500 mg chewable tablet Chew 1 tablet daily    Chest Congestion Relief 100 MG/5ML oral liquid GIVE 10ML (200MG) ORALLY THREE TIMES DAILY AS NEEDED FOR COUGH *REORDER*    ciclopirox (LOPROX) 0.77 % cream     cyclopentolate (CYCLOGYL) 1 % ophthalmic solution INSTILL 1 DROP INTO RIGHT EYE THREE TIMES DAILY (DETACHED RENTINAL SURGERY) *REORDER*    ergocalciferol (VITAMIN D2) 50,000 units Take 1 capsule (50,000 Units total) by mouth once a week    famotidine (PEPCID) 20 mg tablet GIVE 1 TABLET ORALLY DAILY (GASTROESOPHAGEAL REFLUX DISEASE)    ketoconazole (NIZORAL) 2 % shampoo     Lidocaine (Lidocaine Pain Relief) 4 % PTCH APPLY 1/2 PATCH TOPICALLY TO EACH KNEE (PAIN) REMOVE AFTER 12 HOURS *REORDER*;APPLY 1 PATCH  "TOPICALLY TO RIGHT SHOULDER (PAIN) REMOVE AFTER 12 HOURS *REORDER*    lisinopril (ZESTRIL) 40 mg tablet GIVE 1 TABLET ORALLY DAILY (HYPERTENSION)    Lumigan 0.01 % ophthalmic drops     meloxicam (MOBIC) 7.5 mg tablet TAKE 1 TABLET ORALLY IN THE MORNING WITH MEAL    mometasone (ELOCON) 0.1 % cream APPLY TOPICALLY TO AFFECTED AREA AT BEDTIME (SKIN RASH) *REORDER*    Mouthwashes (Biotene Dry Mouth) LIQD USE TO RINSE MOUTH TWICE DAILY FOR DRY MOUTH *REORDER*    nystatin (MYCOSTATIN) powder APPLY TOPICALLY UNDER BOTH ARMS TWICE DAILY (REDNESS) *REORDER*    Premarin vaginal cream APPLY TOPICALLY 1/4 INCH WITH FINGER TO VAGINA AT BEDTIME ON MONDAY AND FRIDAY (MENOPAUSAL TREATMENT) *REORDER*    Restasis 0.05 % ophthalmic emulsion INSTILL 1 DROP INTO EACH EYE TWICE DAILY FOR DRY EYE *BRAND PER INSURANCE* *REORDER*    Simbrinza 1-0.2 % SUSP     sodium chloride (OCEAN) 0.65 % nasal spray 1 spray into each nostril 3 (three) times a day       Objective     /68   Pulse 84   Resp 15   Ht 5' 4\" (1.626 m)   Wt 83 kg (183 lb)   SpO2 98%   BMI 31.41 kg/m²     Physical Exam  Constitutional:       General: She is not in acute distress.     Appearance: Normal appearance. She is not ill-appearing.   HENT:      Head: Normocephalic.   Eyes:      Pupils: Pupils are equal, round, and reactive to light.   Cardiovascular:      Rate and Rhythm: Regular rhythm.      Heart sounds: Normal heart sounds.   Pulmonary:      Breath sounds: No wheezing, rhonchi or rales.   Musculoskeletal:         General: Tenderness present.      Right lower leg: No edema.      Left lower leg: No edema.      Comments: Right shoulder tenderness along with bilateral knee tenderness   Skin:     Findings: Rash present.      Comments: Fading rash around the right shoulder definitely improved from last visit   Neurological:      Mental Status: She is alert. Mental status is at baseline.   Psychiatric:         Mood and Affect: Mood normal.         Behavior: " Behavior normal.         Thought Content: Thought content normal.         Judgment: Judgment normal.       Dustin Baeza MD     Principal Discharge DX:	Aggressive behavior   1

## 2024-04-05 ENCOUNTER — TELEPHONE (OUTPATIENT)
Dept: INTERNAL MEDICINE CLINIC | Facility: CLINIC | Age: 89
End: 2024-04-05

## 2024-04-05 DIAGNOSIS — N95.2 VAGINAL ATROPHY: ICD-10-CM

## 2024-04-05 RX ORDER — CONJUGATED ESTROGENS 0.62 MG/G
CREAM VAGINAL
Qty: 30 G | Refills: 5 | Status: SHIPPED | OUTPATIENT
Start: 2024-04-05 | End: 2024-04-08 | Stop reason: SDUPTHER

## 2024-04-05 NOTE — TELEPHONE ENCOUNTER
Hi Dr. Baeza   [facility] called and asked if a hold order could be placed until 4/12 for the premarin cream because she is running out. The medication can be sent to [facility]. Thank you.

## 2024-04-08 ENCOUNTER — OFFICE VISIT (OUTPATIENT)
Dept: INTERNAL MEDICINE CLINIC | Facility: CLINIC | Age: 89
End: 2024-04-08
Payer: MEDICARE

## 2024-04-08 VITALS
SYSTOLIC BLOOD PRESSURE: 124 MMHG | HEART RATE: 94 BPM | HEIGHT: 64 IN | BODY MASS INDEX: 31.41 KG/M2 | DIASTOLIC BLOOD PRESSURE: 76 MMHG | TEMPERATURE: 97.6 F | OXYGEN SATURATION: 96 %

## 2024-04-08 DIAGNOSIS — I10 PRIMARY HYPERTENSION: ICD-10-CM

## 2024-04-08 DIAGNOSIS — B37.2 YEAST INFECTION OF THE SKIN: ICD-10-CM

## 2024-04-08 DIAGNOSIS — M25.511 ACUTE PAIN OF RIGHT SHOULDER: Primary | ICD-10-CM

## 2024-04-08 DIAGNOSIS — N95.2 VAGINAL ATROPHY: ICD-10-CM

## 2024-04-08 PROCEDURE — G2211 COMPLEX E/M VISIT ADD ON: HCPCS | Performed by: INTERNAL MEDICINE

## 2024-04-08 PROCEDURE — 99214 OFFICE O/P EST MOD 30 MIN: CPT | Performed by: INTERNAL MEDICINE

## 2024-04-08 RX ORDER — CONJUGATED ESTROGENS 0.62 MG/G
CREAM VAGINAL
Qty: 30 G | Refills: 5 | Status: SHIPPED | OUTPATIENT
Start: 2024-04-08

## 2024-04-08 RX ORDER — NYSTATIN 100000 [USP'U]/G
POWDER TOPICAL 2 TIMES DAILY
Qty: 30 G | Refills: 11 | Status: SHIPPED | OUTPATIENT
Start: 2024-04-08

## 2024-04-08 NOTE — ASSESSMENT & PLAN NOTE
Right shoulder pain with known arthritis and soft tissue injuries documented on previous imaging now with drainage coming through the skin and a 1 mm opening.  Drainage is serosanguineous patient is afebrile but has increased swelling and tenderness in the shoulder.  I requested a MRI scan of the shoulder to evaluate further.  Follow-up after MRI is complete

## 2024-04-08 NOTE — ASSESSMENT & PLAN NOTE
Vaginal atrophy recommend continuation of conjugated estrogen 1/4 inch applied to the vaginal cavity on Mondays and Fridays

## 2024-04-08 NOTE — PROGRESS NOTES
Name: Denisse Jolly      : 1935      MRN: [id number]  Encounter Provider: Dustin Baeza MD  Encounter Date: 2024   Encounter department: [facility] INTERNAL MEDICINE    Assessment & Plan     1. Acute pain of right shoulder  Assessment & Plan:  Right shoulder pain with known arthritis and soft tissue injuries documented on previous imaging now with drainage coming through the skin and a 1 mm opening.  Drainage is serosanguineous patient is afebrile but has increased swelling and tenderness in the shoulder.  I requested a MRI scan of the shoulder to evaluate further.  Follow-up after MRI is complete    Orders:  -     MRI shoulder right wo contrast; Future; Expected date: 2024    2. Yeast infection of the skin  Assessment & Plan:  Yeast infection of the skin recommend continuation of nystatin powder applied twice a day    Orders:  -     nystatin (MYCOSTATIN) powder; Apply topically 2 (two) times a day    3. Vaginal atrophy  Assessment & Plan:  Vaginal atrophy recommend continuation of conjugated estrogen 1/4 inch applied to the vaginal cavity on  and     Orders:  -     estrogens, conjugated (Premarin) vaginal cream; Apply 1/4 inch with finger to vaginal area at bedtime on  and     4. Primary hypertension  Assessment & Plan:  Blood pressure assessment today confirms adequate control of hypertension recommend continuation of current lisinopril at 40 mg daily             Subjective      This 89-year-old female patient presents today for an acute visit.  She has an increase in in her right shoulder and also a small area of drainage which started several days ago.  There is a small red area on the top of the shoulder covered with a Band-Aid on presentation.  There is some serosanguineous drainage noted on the Band-Aid.  Patient denies any fevers or chills or trauma to the shoulder.  She does have advanced arthritis in the shoulder joint along with soft  tissue injuries that are well-documented on her previous records.  The drainage is certainly a new finding in the area of her shoulder.      Review of Systems   Musculoskeletal:  Positive for arthralgias and myalgias.        Right shoulder pain   Skin:  Positive for wound.        Small area of drainage approximately 1 mm in diameter on the right shoulder serosanguineous drainage coming from this wound       Current Outpatient Medications on File Prior to Visit   Medication Sig    acetaminophen (TYLENOL) 500 mg tablet Take 2 tablets (1,000 mg total) by mouth every 8 (eight) hours    Allergy Relief 10 MG tablet GIVE 1 TABLET ORALLY DAILY (ALLERGIES)    ALPRAZolam (XANAX) 0.25 mg tablet TAKE 1 TABLET ORALLY IN THE MORNING FOR ANXIETY (CONTROL) *REORDER*;TAKE 2 TABLETS (0.5MG) ORALLY AT BEDTIME FOR ANXIETY (CONTROL) *REORDER*    amLODIPine (NORVASC) 5 mg tablet TAKE 1 TABLET ORALLY DAILY (HYPERTENSION) *REORDER*    Aspirin Low Dose 81 MG chewable tablet CHEW 1 TABLET AND SWALLOW ORALLY THREE TIMES WEEKLY (CARDIAC DISEASE) *REORDER*    calcium carbonate (TUMS) 500 mg chewable tablet Chew 1 tablet daily    Chest Congestion Relief 100 MG/5ML oral liquid GIVE 10ML (200MG) ORALLY THREE TIMES DAILY AS NEEDED FOR COUGH *REORDER*    ciclopirox (LOPROX) 0.77 % cream     cyclopentolate (CYCLOGYL) 1 % ophthalmic solution INSTILL 1 DROP INTO RIGHT EYE THREE TIMES DAILY (DETACHED RENTINAL SURGERY) *REORDER*    ergocalciferol (VITAMIN D2) 50,000 units Take 1 capsule (50,000 Units total) by mouth once a week    famotidine (PEPCID) 20 mg tablet GIVE 1 TABLET ORALLY DAILY (GASTROESOPHAGEAL REFLUX DISEASE)    ketoconazole (NIZORAL) 2 % shampoo     Lidocaine (Lidocaine Pain Relief) 4 % PTCH APPLY 1/2 PATCH TOPICALLY TO EACH KNEE (PAIN) REMOVE AFTER 12 HOURS *REORDER*;APPLY 1 PATCH TOPICALLY TO RIGHT SHOULDER (PAIN) REMOVE AFTER 12 HOURS *REORDER*    lisinopril (ZESTRIL) 40 mg tablet GIVE 1 TABLET ORALLY DAILY (HYPERTENSION)    Lumigan  "0.01 % ophthalmic drops     meloxicam (MOBIC) 7.5 mg tablet TAKE 1 TABLET ORALLY IN THE MORNING WITH MEAL    mometasone (ELOCON) 0.1 % cream APPLY TOPICALLY TO AFFECTED AREA AT BEDTIME (SKIN RASH) *REORDER*    Mouthwashes (Biotene Dry Mouth) LIQD USE TO RINSE MOUTH TWICE DAILY FOR DRY MOUTH *REORDER*    Restasis 0.05 % ophthalmic emulsion INSTILL 1 DROP INTO EACH EYE TWICE DAILY FOR DRY EYE *BRAND PER INSURANCE* *REORDER*    Simbrinza 1-0.2 % SUSP     sodium chloride (OCEAN) 0.65 % nasal spray 1 spray into each nostril 3 (three) times a day    [DISCONTINUED] estrogens, conjugated (Premarin) vaginal cream Apply 1/4 inch with finger to vaginal area at bedtime on Mondays and Fridays    [DISCONTINUED] nystatin (MYCOSTATIN) powder APPLY TOPICALLY UNDER BOTH ARMS TWICE DAILY (REDNESS) *REORDER*       Objective     /76   Pulse 94   Temp 97.6 °F (36.4 °C)   Ht 5' 4\" (1.626 m)   SpO2 96%   BMI 31.41 kg/m²     Physical Exam  Constitutional:       General: She is not in acute distress.     Appearance: Normal appearance. She is not ill-appearing.   HENT:      Head: Normocephalic.   Eyes:      Pupils: Pupils are equal, round, and reactive to light.   Cardiovascular:      Rate and Rhythm: Regular rhythm.      Heart sounds: Normal heart sounds.   Pulmonary:      Breath sounds: No wheezing, rhonchi or rales.   Musculoskeletal:      Right lower leg: No edema.      Left lower leg: No edema.   Skin:     Comments: 1 mm wound on the right shoulder with serosanguineous drainage   Neurological:      Mental Status: She is alert. Mental status is at baseline.   Psychiatric:         Mood and Affect: Mood normal.         Behavior: Behavior normal.         Thought Content: Thought content normal.         Judgment: Judgment normal.       Dustin Baeza MD    "

## 2024-04-08 NOTE — ASSESSMENT & PLAN NOTE
Blood pressure assessment today confirms adequate control of hypertension recommend continuation of current lisinopril at 40 mg daily

## 2024-04-18 ENCOUNTER — RA CDI HCC (OUTPATIENT)
Dept: OTHER | Facility: HOSPITAL | Age: 89
End: 2024-04-18

## 2024-04-19 ENCOUNTER — HOSPITAL ENCOUNTER (OUTPATIENT)
Dept: RADIOLOGY | Age: 89
Discharge: HOME | End: 2024-04-19
Payer: MEDICARE

## 2024-04-19 DIAGNOSIS — M25.511 ACUTE PAIN OF RIGHT SHOULDER: ICD-10-CM

## 2024-04-19 PROCEDURE — 73221 MRI JOINT UPR EXTREM W/O DYE: CPT

## 2024-04-24 ENCOUNTER — OFFICE VISIT (OUTPATIENT)
Dept: INTERNAL MEDICINE CLINIC | Facility: CLINIC | Age: 89
End: 2024-04-24
Payer: MEDICARE

## 2024-04-24 VITALS
OXYGEN SATURATION: 94 % | HEART RATE: 95 BPM | DIASTOLIC BLOOD PRESSURE: 78 MMHG | HEIGHT: 64 IN | BODY MASS INDEX: 31.41 KG/M2 | SYSTOLIC BLOOD PRESSURE: 126 MMHG

## 2024-04-24 DIAGNOSIS — M24.811 INTERNAL DERANGEMENT OF SHOULDER, RIGHT: Primary | ICD-10-CM

## 2024-04-24 DIAGNOSIS — N95.2 VAGINAL ATROPHY: ICD-10-CM

## 2024-04-24 DIAGNOSIS — B37.31 VAGINAL YEAST INFECTION: ICD-10-CM

## 2024-04-24 DIAGNOSIS — I10 PRIMARY HYPERTENSION: ICD-10-CM

## 2024-04-24 DIAGNOSIS — B37.2 YEAST INFECTION OF THE SKIN: ICD-10-CM

## 2024-04-24 PROCEDURE — G2211 COMPLEX E/M VISIT ADD ON: HCPCS | Performed by: INTERNAL MEDICINE

## 2024-04-24 PROCEDURE — 99214 OFFICE O/P EST MOD 30 MIN: CPT | Performed by: INTERNAL MEDICINE

## 2024-04-24 RX ORDER — CONJUGATED ESTROGENS 0.62 MG/G
CREAM VAGINAL
Qty: 30 G | Refills: 5 | Status: SHIPPED | OUTPATIENT
Start: 2024-04-24

## 2024-04-24 RX ORDER — FLUCONAZOLE 100 MG/1
100 TABLET ORAL DAILY
Qty: 10 TABLET | Refills: 0 | Status: SHIPPED | OUTPATIENT
Start: 2024-04-24 | End: 2024-05-04

## 2024-04-24 NOTE — ASSESSMENT & PLAN NOTE
Patient continues with yeast infections of the skin despite using nystatin powder will use Diflucan 100 mg daily for 10 days continue with powder as well

## 2024-04-24 NOTE — ASSESSMENT & PLAN NOTE
Blood pressure assessment arms adequate control continue current therapy of amlodipine 5 mg daily and lisinopril 40 mg daily

## 2024-04-24 NOTE — ASSESSMENT & PLAN NOTE
Vaginal discharge consistent with a yeast infection using estrogen cream twice a day will increase to 3 times a week.  Also course of Diflucan 100 mg daily for 10 days.  If symptoms persist GYN consultation is recommended

## 2024-04-24 NOTE — ASSESSMENT & PLAN NOTE
MRI scan of the right shoulder reviewed in detail with the patient including review of the images.  It confirms worsening of the osteoarthritis of the right shoulder joint along with multiple tears and rotator cuff muscles as well as tendinitis and partial tear of the bicep tendon.  There is also significant effusion in the joint space.  Comparison to previous MRI scan from roughly 2 to 3 years ago indicates progression of her disease.  She has previously not done well with cortisone injections experiencing a side effect of the cortisone and she is not willing to go through another cortisone injection.  I did discuss in detail with the patient today the possibility for a PRP injection and provided her with some patient education materials about PRP injections.  While I do not believe this will cure her current situation completely it may provide her with some relief of her discomfort.  I asked her to consider the options of PRP indicating that it would not be covered by her insurance and let me know if she is interested in a consultation with one of the local physicians that does PRP injections.

## 2024-04-24 NOTE — PROGRESS NOTES
Name: Denisse Jolly      : 1935      MRN: [id number]  Encounter Provider: Dustin Baeza MD  Encounter Date: 2024   Encounter department: [facility] INTERNAL MEDICINE    Assessment & Plan     1. Primary hypertension  Assessment & Plan:  Blood pressure assessment arms adequate control continue current therapy of amlodipine 5 mg daily and lisinopril 40 mg daily      2. Vaginal atrophy  Assessment & Plan:  Increased vaginal estrogen cream to 3 days a week from 2 days a week    Orders:  -     estrogens, conjugated (Premarin) vaginal cream; Apply 1/4 inch with finger to vaginal area at bedtime on ,   and     3. Vaginal yeast infection  Assessment & Plan:  Vaginal discharge consistent with a yeast infection using estrogen cream twice a day will increase to 3 times a week.  Also course of Diflucan 100 mg daily for 10 days.  If symptoms persist GYN consultation is recommended    Orders:  -     fluconazole (DIFLUCAN) 100 mg tablet; Take 1 tablet (100 mg total) by mouth daily for 10 days    4. Internal derangement of shoulder, right  Assessment & Plan:  MRI scan of the right shoulder reviewed in detail with the patient including review of the images.  It confirms worsening of the osteoarthritis of the right shoulder joint along with multiple tears and rotator cuff muscles as well as tendinitis and partial tear of the bicep tendon.  There is also significant effusion in the joint space.  Comparison to previous MRI scan from roughly 2 to 3 years ago indicates progression of her disease.  She has previously not done well with cortisone injections experiencing a side effect of the cortisone and she is not willing to go through another cortisone injection.  I did discuss in detail with the patient today the possibility for a PRP injection and provided her with some patient education materials about PRP injections.  While I do not believe this will cure her current  situation completely it may provide her with some relief of her discomfort.  I asked her to consider the options of PRP indicating that it would not be covered by her insurance and let me know if she is interested in a consultation with one of the local physicians that does PRP injections.      5. Yeast infection of the skin  Assessment & Plan:  Patient continues with yeast infections of the skin despite using nystatin powder will use Diflucan 100 mg daily for 10 days continue with powder as well             Subjective      This 89-year-old female patient returns today for follow-up visit.  After her last visit with us we requested a MRI scan of the right shoulder because of increasing swelling in the shoulder joint as well as some serosanguineous fluid draining from the shoulder joint itself.  There is no increased temperature emanating from the shoulder joint.  She is known to have chronic muscle and tendon tears and arthritis in the shoulder joint.    Patient also has been experiencing vaginal discharge as well as skin fold yeast type of symptoms.       Review of Systems   Genitourinary:  Positive for vaginal discharge.   Musculoskeletal:  Positive for arthralgias.        Significant limitation range of motion of the right shoulder with drainage of small amounts of serosanguineous fluid through the skin   Skin:  Positive for rash.        Yeast rash of the axilla   All other systems reviewed and are negative.      Current Outpatient Medications on File Prior to Visit   Medication Sig    acetaminophen (TYLENOL) 500 mg tablet Take 2 tablets (1,000 mg total) by mouth every 8 (eight) hours    Allergy Relief 10 MG tablet GIVE 1 TABLET ORALLY DAILY (ALLERGIES)    ALPRAZolam (XANAX) 0.25 mg tablet TAKE 1 TABLET ORALLY IN THE MORNING FOR ANXIETY (CONTROL) *REORDER*;TAKE 2 TABLETS (0.5MG) ORALLY AT BEDTIME FOR ANXIETY (CONTROL) *REORDER*    amLODIPine (NORVASC) 5 mg tablet TAKE 1 TABLET ORALLY DAILY (HYPERTENSION)  *REORDER*    Aspirin Low Dose 81 MG chewable tablet CHEW 1 TABLET AND SWALLOW ORALLY THREE TIMES WEEKLY (CARDIAC DISEASE) *REORDER*    calcium carbonate (TUMS) 500 mg chewable tablet Chew 1 tablet daily    Chest Congestion Relief 100 MG/5ML oral liquid GIVE 10ML (200MG) ORALLY THREE TIMES DAILY AS NEEDED FOR COUGH *REORDER*    ciclopirox (LOPROX) 0.77 % cream     cyclopentolate (CYCLOGYL) 1 % ophthalmic solution INSTILL 1 DROP INTO RIGHT EYE THREE TIMES DAILY (DETACHED RENTINAL SURGERY) *REORDER*    ergocalciferol (VITAMIN D2) 50,000 units Take 1 capsule (50,000 Units total) by mouth once a week    famotidine (PEPCID) 20 mg tablet GIVE 1 TABLET ORALLY DAILY (GASTROESOPHAGEAL REFLUX DISEASE)    ketoconazole (NIZORAL) 2 % shampoo     Lidocaine (Lidocaine Pain Relief) 4 % PTCH APPLY 1/2 PATCH TOPICALLY TO EACH KNEE (PAIN) REMOVE AFTER 12 HOURS *REORDER*;APPLY 1 PATCH TOPICALLY TO RIGHT SHOULDER (PAIN) REMOVE AFTER 12 HOURS *REORDER*    lisinopril (ZESTRIL) 40 mg tablet GIVE 1 TABLET ORALLY DAILY (HYPERTENSION)    Lumigan 0.01 % ophthalmic drops     meloxicam (MOBIC) 7.5 mg tablet TAKE 1 TABLET ORALLY IN THE MORNING WITH MEAL    mometasone (ELOCON) 0.1 % cream APPLY TOPICALLY TO AFFECTED AREA AT BEDTIME (SKIN RASH) *REORDER*    Mouthwashes (Biotene Dry Mouth) LIQD USE TO RINSE MOUTH TWICE DAILY FOR DRY MOUTH *REORDER*    nystatin (MYCOSTATIN) powder Apply topically 2 (two) times a day    Restasis 0.05 % ophthalmic emulsion INSTILL 1 DROP INTO EACH EYE TWICE DAILY FOR DRY EYE *BRAND PER INSURANCE* *REORDER*    Simbrinza 1-0.2 % SUSP     sodium chloride (OCEAN) 0.65 % nasal spray 1 spray into each nostril 3 (three) times a day    [DISCONTINUED] estrogens, conjugated (Premarin) vaginal cream Apply 1/4 inch with finger to vaginal area at bedtime on Mondays and Fridays    [DISCONTINUED] fluconazole (DIFLUCAN) 100 mg tablet Take 1 tablet (100 mg total) by mouth daily for 7 days       Objective     /78   Pulse 95   Ht  "5' 4\" (1.626 m)   SpO2 94%   BMI 31.41 kg/m²     Physical Exam  Constitutional:       General: She is not in acute distress.     Appearance: Normal appearance. She is not ill-appearing.   HENT:      Head: Normocephalic.   Eyes:      Pupils: Pupils are equal, round, and reactive to light.   Cardiovascular:      Rate and Rhythm: Regular rhythm.      Heart sounds: Normal heart sounds.   Pulmonary:      Breath sounds: No wheezing, rhonchi or rales.   Musculoskeletal:      Right lower leg: No edema.      Left lower leg: No edema.      Comments: Right shoulder remains swollen and tender to touch 2 very small openings in the skin are draining small amounts of serosanguineous fluid.   Neurological:      Mental Status: She is alert. Mental status is at baseline.   Psychiatric:         Mood and Affect: Mood normal.         Behavior: Behavior normal.         Thought Content: Thought content normal.         Judgment: Judgment normal.       Dustin Baeza MD    "

## 2024-05-08 ENCOUNTER — TELEPHONE (OUTPATIENT)
Age: 89
End: 2024-05-08

## 2024-05-08 NOTE — TELEPHONE ENCOUNTER
Pt daughter Kerri and Pt Denisse would like to speak with provider regarding a procedure that was discussed at previous appt. Please call Kerri [phone] to arrange.

## 2024-05-08 NOTE — TELEPHONE ENCOUNTER
If possible this may be better to arrange an in person visit with the patient and her daughter to discuss the treatment.  Please contact them.

## 2024-05-09 ENCOUNTER — TELEPHONE (OUTPATIENT)
Age: 89
End: 2024-05-09

## 2024-05-09 NOTE — TELEPHONE ENCOUNTER
Tasneem from [facility] called asking if order for Lidocaine patches 4% can be changed to PRN or discontinued for now since she is unable to use Lidocaine patches on R shoulder currently.     If orders can be changed the Fax # is [phone]    Thank you

## 2024-05-10 ENCOUNTER — TELEPHONE (OUTPATIENT)
Age: 89
End: 2024-05-10

## 2024-05-10 NOTE — TELEPHONE ENCOUNTER
Please let the patient know that our office hours are running late today and I will not be able to contact them for a group phone call until early next week.

## 2024-05-10 NOTE — TELEPHONE ENCOUNTER
Patient called to see if a phone call can be set up with Dr. Baeza, patient and daughter on a call to discuss R shoulder procedure discussed at last appointment.  Offered to schedule virtual appointment but daughter lives in [place].      Please advise    Thank you

## 2024-05-13 ENCOUNTER — TELEPHONE (OUTPATIENT)
Age: 89
End: 2024-05-13

## 2024-05-13 DIAGNOSIS — R21 RASH: Primary | ICD-10-CM

## 2024-05-13 NOTE — TELEPHONE ENCOUNTER
Tasneem from [facility] contacted the office this afternoon to discuss ongoing issue with patient's right shoulder. Patient was seen for this on 4/24/24. Tasneem states shoulder has 3 new open areas that are weeping yellow fluid, requiring every other day bandage changes. Asking if this would require another appointment or if provider recommends additional intervention at this time. Patient continues to have rash under both arms, unrelieved by prescribed Diflucan and nystatin powder. Asking for a dermatology order be placed for further evaluation. Tasneem states patient and family have expressed concern with ongoing issues. Upon chart review, provider actively working on setting up a telephone/virtual appointment with patient and family to discuss. Tasneem made aware. [facility] requesting update from provider on plan. Please advise.

## 2024-05-13 NOTE — PROGRESS NOTES
Telephone call with the patient and her daughter today regarding her ongoing right shoulder issue.  Cannot come up with any other options other than to possibly explore PRP therapy.  Her advanced age I do not believe surgery shoulder surgery is a good option for her.  Discussed seeing Dr. Mccoy who has experience with PRP treatment to see if he feels an injection in her right shoulder would be beneficial.    Also ongoing rash of the axilla has not responded to topical application of nystatin powder recommend dermatology consult with Dr. Payton

## 2024-06-11 DIAGNOSIS — L85.3 DRY SKIN: Primary | ICD-10-CM

## 2024-06-11 DIAGNOSIS — E55.9 VITAMIN D DEFICIENCY: ICD-10-CM

## 2024-06-12 RX ORDER — ERGOCALCIFEROL 1.25 MG/1
CAPSULE ORAL
Qty: 4 CAPSULE | Refills: 11 | Status: SHIPPED | OUTPATIENT
Start: 2024-06-12

## 2024-06-12 RX ORDER — CERAMIDES 1,3,6-II
CREAM (GRAM) TOPICAL
Qty: 453 G | Refills: 11 | Status: SHIPPED | OUTPATIENT
Start: 2024-06-12

## 2024-06-17 DIAGNOSIS — F41.9 ANXIETY: ICD-10-CM

## 2024-06-18 DIAGNOSIS — F41.9 ANXIETY: ICD-10-CM

## 2024-06-18 RX ORDER — ALPRAZOLAM 0.25 MG/1
TABLET ORAL
Qty: 90 TABLET | Refills: 0 | Status: SHIPPED | OUTPATIENT
Start: 2024-06-18 | End: 2024-06-18

## 2024-06-18 RX ORDER — ALPRAZOLAM 0.25 MG/1
TABLET ORAL
Qty: 90 TABLET | Refills: 0 | Status: SHIPPED | OUTPATIENT
Start: 2024-06-18

## 2024-06-25 DIAGNOSIS — M19.90 ARTHRITIS: ICD-10-CM

## 2024-06-25 RX ORDER — LIDOCAINE PAIN RELIEF 40 MG/1000MG
PATCH TOPICAL
Qty: 60 PATCH | Refills: 11 | Status: SHIPPED | OUTPATIENT
Start: 2024-06-25

## 2024-06-26 DIAGNOSIS — I51.9 CARDIAC DISEASE: ICD-10-CM

## 2024-06-26 RX ORDER — ASPIRIN 81 MG/1
TABLET, CHEWABLE ORAL
Qty: 13 TABLET | Refills: 11 | Status: SHIPPED | OUTPATIENT
Start: 2024-06-26

## 2024-07-16 DIAGNOSIS — M19.90 ARTHRITIS: ICD-10-CM

## 2024-07-17 RX ORDER — PSEUDOEPHED/ACETAMINOPH/DIPHEN 30MG-500MG
TABLET ORAL
Qty: 180 TABLET | Refills: 2 | Status: SHIPPED | OUTPATIENT
Start: 2024-07-17

## 2024-07-25 DIAGNOSIS — T78.40XD ALLERGY, SUBSEQUENT ENCOUNTER: Primary | ICD-10-CM

## 2024-07-25 RX ORDER — FLUTICASONE PROPIONATE 50 MCG
SPRAY, SUSPENSION (ML) NASAL
Qty: 16 G | Refills: 11 | Status: SHIPPED | OUTPATIENT
Start: 2024-07-25

## 2024-07-26 DIAGNOSIS — F41.9 ANXIETY: ICD-10-CM

## 2024-07-26 RX ORDER — ALPRAZOLAM 0.25 MG/1
TABLET ORAL
Qty: 90 TABLET | Refills: 5 | Status: SHIPPED | OUTPATIENT
Start: 2024-07-26

## 2024-08-06 ENCOUNTER — OFFICE VISIT (OUTPATIENT)
Dept: INTERNAL MEDICINE CLINIC | Facility: CLINIC | Age: 89
End: 2024-08-06
Payer: MEDICARE

## 2024-08-06 VITALS
OXYGEN SATURATION: 94 % | DIASTOLIC BLOOD PRESSURE: 68 MMHG | SYSTOLIC BLOOD PRESSURE: 122 MMHG | HEART RATE: 87 BPM | TEMPERATURE: 97.3 F | BODY MASS INDEX: 31.41 KG/M2 | HEIGHT: 64 IN

## 2024-08-06 DIAGNOSIS — Z13.1 SCREENING FOR DIABETES MELLITUS: ICD-10-CM

## 2024-08-06 DIAGNOSIS — Z13.0 SCREENING FOR DEFICIENCY ANEMIA: ICD-10-CM

## 2024-08-06 DIAGNOSIS — M12.811 ROTATOR CUFF TEAR ARTHROPATHY OF RIGHT SHOULDER: ICD-10-CM

## 2024-08-06 DIAGNOSIS — M19.90 ARTHRITIS: ICD-10-CM

## 2024-08-06 DIAGNOSIS — M75.101 ROTATOR CUFF TEAR ARTHROPATHY OF RIGHT SHOULDER: ICD-10-CM

## 2024-08-06 DIAGNOSIS — I10 PRIMARY HYPERTENSION: Primary | ICD-10-CM

## 2024-08-06 DIAGNOSIS — E78.5 HYPERLIPIDEMIA, UNSPECIFIED HYPERLIPIDEMIA TYPE: ICD-10-CM

## 2024-08-06 DIAGNOSIS — E74.39 GLUCOSE INTOLERANCE: ICD-10-CM

## 2024-08-06 DIAGNOSIS — B37.2 YEAST INFECTION OF THE SKIN: ICD-10-CM

## 2024-08-06 PROBLEM — K62.5 RECTAL BLEEDING: Status: RESOLVED | Noted: 2024-01-15 | Resolved: 2024-08-06

## 2024-08-06 PROCEDURE — 99214 OFFICE O/P EST MOD 30 MIN: CPT | Performed by: INTERNAL MEDICINE

## 2024-08-06 PROCEDURE — G2211 COMPLEX E/M VISIT ADD ON: HCPCS | Performed by: INTERNAL MEDICINE

## 2024-08-06 NOTE — PROGRESS NOTES
Ambulatory Visit  Name: Denisse Jolly      : 1935      MRN: [id number]  Encounter Provider: Dustin Baeza MD  Encounter Date: 2024   Encounter department: [facility] INTERNAL MEDICINE    Assessment & Plan   1. Yeast infection of the skin  Assessment & Plan:  Yeast infection of the right axilla difficult to resolve she [place] recommended topical antiyeast powder which we have tried in the past without success I will refer the patient onto Dr. Payton for further evaluation.  Orders:  -     Ambulatory Referral to Dermatology; Future  2. Primary hypertension  Assessment & Plan:  Blood pressure assessment today confirms adequate hypertension control recommend continuation of lisinopril at 40 mg daily and amlodipine at 5 mg daily a comprehensive metabolic profile has been requested  Orders:  -     Comprehensive metabolic panel; Future  -     Comprehensive metabolic panel  3. Glucose intolerance  Assessment & Plan:  I have requested an updated comprehensive metabolic profile to evaluate the patient's current level of glucose control.  She should avoid concentrated sugars and excessive carbohydrates.  Orders:  -     Comprehensive metabolic panel; Future  -     Comprehensive metabolic panel  4. Screening for diabetes mellitus  5. Hyperlipidemia, unspecified hyperlipidemia type  Assessment & Plan:  An updated lipid profile has been requested patient should continue a low-cholesterol diet.  Orders:  -     Lipid panel; Future  -     Lipid panel  6. Screening for deficiency anemia  -     CBC and differential; Future  -     CBC and differential  7. Arthritis  Assessment & Plan:  Arthritis of the left and right knee recent tweaked injury to the left knee twisted it and it has been swollen and tender since does have a small popliteal/Bakers cyst on the back of the knee consistent with arthritic changes.  Recommend icing for 20 minutes 2 times a day and  Tylenol 1000 mg every 8 hours as needed for discomfort  8. Rotator cuff tear arthropathy of right shoulder  Assessment & Plan:  Arthritis of the right shoulder quite advanced with indications of soft tissue injury as well on last MRI.  Consultation with orthopedic surgeon in [place] I have not received any feedback from that practice the last and to send us an updated copy of her office visit.         History of Present Illness     This pleasant 89-year-old female patient presents to our office today for a routine follow-up visit.  She continues to experience significant discomfort due to arthritis in her right shoulder as well as arthritis in both knees.  She did see a orthopedic surgeon in [place] Dr. Marin.  I do not have a report from this physician.  Patient indicates that they took several x-rays but had no's recommendations for intervention to help with her chronic pain.    Patient also continues to have a rash of the right axillary region.  She was seen by a dermatologist at [facility] who placed her on an antifungal powder which is the same type of treatment that I have recommended using nystatin in the past.  Still has a rash very difficult to get rid of this.  I have referred her on to see Dr. Dustin Driscoll to see if he has any recommendations for ways to get rid of her rash.      Review of Systems   Musculoskeletal:  Positive for arthralgias.        Knee pain as well as right shoulder pain   Skin:  Positive for rash.        Rash of the right axilla     Past Medical History:   Diagnosis Date    Epistaxis     RESOLVED: 2/18/17    Saphenous vein thrombophlebitis, right 2012     Past Surgical History:   Procedure Laterality Date    GALLBLADDER SURGERY  2006     Family History   Problem Relation Age of Onset    Hypertension Mother      Social History     Tobacco Use    Smoking status: Never    Smokeless tobacco: Never   Vaping Use    Vaping status: Never Used   Substance and Sexual Activity     Alcohol use: Not Currently    Drug use: Never    Sexual activity: Not Currently     Current Outpatient Medications on File Prior to Visit   Medication Sig    Acetaminophen Extra Strength 500 MG TABS TAKE 2 CAPLETS (1000MG) ORALLY EVERY 8 HOURS (OSTEOARTHRITIS) *NOT TO EXCEED 4GMS APAP/24HR*    Allergy Relief 10 MG tablet GIVE 1 TABLET ORALLY DAILY (ALLERGIES)    ALPRAZolam (XANAX) 0.25 mg tablet TAKE 1 TABLET ORALLY IN THE MORNING (ANXIETY) (CONTROL) *REORDER*;TAKE 2 TABLETS (0.5MG) ORALLY AT BEDTIME (ANXIETY) (CONTROL) *REORDER*    amLODIPine (NORVASC) 5 mg tablet TAKE 1 TABLET ORALLY DAILY (HYPERTENSION) *REORDER*    aspirin 81 mg chewable tablet CHEW 1 TABLET AND SWALLOW ORALLY THREE TIMES WEEKLY MONDAY, WEDNESDAY & FRIDAY (CARDIAC DISEASE)    calcium carbonate (TUMS) 500 mg chewable tablet Chew 1 tablet daily    Chest Congestion Relief 100 MG/5ML oral liquid GIVE 10ML (200MG) ORALLY THREE TIMES DAILY AS NEEDED FOR COUGH *REORDER*    ciclopirox (LOPROX) 0.77 % cream     cyclopentolate (CYCLOGYL) 1 % ophthalmic solution INSTILL 1 DROP INTO RIGHT EYE THREE TIMES DAILY (DETACHED RENTINAL SURGERY) *REORDER*    Emollient (CeraVe) CREA APPLY TOPICALLY TO BILATERAL UPPER EXTREMITIES TWICE DAILY (DRY SKIN) *REORDER*    ergocalciferol (VITAMIN D2) 50,000 units GIVE 1 CAPSULE ORALLY EVERY WEEK (SUPPLEMENT) (DO NOT CRUSH) *REORDER*    estrogens, conjugated (Premarin) vaginal cream Apply 1/4 inch with finger to vaginal area at bedtime on Mondays, Wednesdays  and Fridays    famotidine (PEPCID) 20 mg tablet GIVE 1 TABLET ORALLY DAILY (GASTROESOPHAGEAL REFLUX DISEASE)    fluticasone (FLONASE) 50 mcg/act nasal spray INHALE 1 SPRAY INTO EACH NOSTRIL TWICE DAILY (ALLERGIES) *REORDER*    ketoconazole (NIZORAL) 2 % shampoo     Lidocaine Pain Relief 4 % PTCH APPLY 1/2 PATCH TOPICALLY TO EACH KNEE AS NEEDED FOR PAIN REMOVE AFTER 12 HOURS *REORDER*;APPLY 1 PATCH TOPICALLY TO RIGHT SHOULDER AS NEEDED FOR PAIN  REMOVE AFTER 12 HOURS  "*REORDER*    lisinopril (ZESTRIL) 40 mg tablet GIVE 1 TABLET ORALLY DAILY (HYPERTENSION)    Lumigan 0.01 % ophthalmic drops     meloxicam (MOBIC) 7.5 mg tablet TAKE 1 TABLET ORALLY IN THE MORNING WITH MEAL    mometasone (ELOCON) 0.1 % cream APPLY TOPICALLY TO AFFECTED AREA AT BEDTIME (SKIN RASH) *REORDER*    Mouthwashes (Biotene Dry Mouth) LIQD USE TO RINSE MOUTH TWICE DAILY FOR DRY MOUTH *REORDER*    nystatin (MYCOSTATIN) powder Apply topically 2 (two) times a day    Restasis 0.05 % ophthalmic emulsion INSTILL 1 DROP INTO EACH EYE TWICE DAILY FOR DRY EYE *BRAND PER INSURANCE* *REORDER*    Simbrinza 1-0.2 % SUSP     sodium chloride (OCEAN) 0.65 % nasal spray 1 spray into each nostril 3 (three) times a day     Allergies   Allergen Reactions    Acetazolamide     Ciprofloxacin Headache and Nausea Only    Nitrofurantoin     Pollen Extract     Sulfa Antibiotics     Keflex [Cephalexin] Itching     Immunization History   Administered Date(s) Administered    COVID-19 PFIZER VACCINE 0.3 ML IM 02/03/2021, 02/26/2021, 01/22/2022    COVID-19 Pfizer mRNA vacc PF francesca-sucrose 12 yr and older (Comirnaty) 01/22/2024    INFLUENZA 11/09/2022, 09/20/2023    Influenza Split High Dose Preservative Free IM 10/18/2016, 10/16/2017    Influenza, high dose seasonal 0.7 mL 10/31/2018, 09/30/2019, 10/13/2020, 10/21/2021, 11/09/2022, 09/20/2023    Pneumococcal Conjugate 13-Valent 02/17/2017     Objective     /68   Pulse 87   Temp (!) 97.3 °F (36.3 °C) (Tympanic)   Ht 5' 4\" (1.626 m)   SpO2 94%   BMI 31.41 kg/m²     Physical Exam  Vitals and nursing note reviewed.   Constitutional:       General: She is not in acute distress.     Appearance: She is well-developed.   HENT:      Head: Normocephalic and atraumatic.   Eyes:      Conjunctiva/sclera: Conjunctivae normal.   Cardiovascular:      Rate and Rhythm: Normal rate and regular rhythm.      Heart sounds: No murmur heard.  Pulmonary:      Effort: Pulmonary effort is normal. No " respiratory distress.      Breath sounds: Normal breath sounds. No wheezing, rhonchi or rales.   Abdominal:      Palpations: Abdomen is soft.      Tenderness: There is no abdominal tenderness.   Musculoskeletal:         General: Swelling and tenderness present.      Cervical back: Neck supple.      Comments: Patient recently twisted her left knee has some discomfort.  Examination consistent with a small popliteal cyst behind the knee.  Findings are consistent with arthritis of the knee.  Recommend icing for 20 minutes 2 times a day continuation of Tylenol 1000 mg every 8 hours for pain control   Skin:     General: Skin is warm and dry.      Capillary Refill: Capillary refill takes less than 2 seconds.   Neurological:      Mental Status: She is alert.   Psychiatric:         Mood and Affect: Mood normal.

## 2024-08-06 NOTE — ASSESSMENT & PLAN NOTE
Arthritis of the right shoulder quite advanced with indications of soft tissue injury as well on last MRI.  Consultation with orthopedic surgeon in [place] I have not received any feedback from that practice the last and to send us an updated copy of her office visit.

## 2024-08-06 NOTE — ASSESSMENT & PLAN NOTE
Blood pressure assessment today confirms adequate hypertension control recommend continuation of lisinopril at 40 mg daily and amlodipine at 5 mg daily a comprehensive metabolic profile has been requested

## 2024-08-06 NOTE — ASSESSMENT & PLAN NOTE
Arthritis of the left and right knee recent tweaked injury to the left knee twisted it and it has been swollen and tender since does have a small popliteal/Bakers cyst on the back of the knee consistent with arthritic changes.  Recommend icing for 20 minutes 2 times a day and Tylenol 1000 mg every 8 hours as needed for discomfort

## 2024-08-06 NOTE — ASSESSMENT & PLAN NOTE
Yeast infection of the right axilla difficult to resolve she did [place] dermat[place] who recommended topical antiyeast powder which we have tried in the past without success I will refer the patient onto Dr. Payton for further evaluation.

## 2024-08-06 NOTE — ASSESSMENT & PLAN NOTE
I have requested an updated comprehensive metabolic profile to evaluate the patient's current level of glucose control.  She should avoid concentrated sugars and excessive carbohydrates.

## 2024-08-20 DIAGNOSIS — F41.9 ANXIETY: ICD-10-CM

## 2024-08-20 RX ORDER — ALPRAZOLAM 0.25 MG
TABLET ORAL
Qty: 90 TABLET | Refills: 0 | Status: SHIPPED | OUTPATIENT
Start: 2024-08-20 | End: 2024-09-13

## 2024-08-28 DIAGNOSIS — K59.00 CONSTIPATION, UNSPECIFIED CONSTIPATION TYPE: Primary | ICD-10-CM

## 2024-08-28 RX ORDER — DOCUSATE SODIUM 100 MG/1
100 CAPSULE, LIQUID FILLED ORAL 2 TIMES DAILY
Qty: 60 CAPSULE | Refills: 5 | Status: SHIPPED | OUTPATIENT
Start: 2024-08-28

## 2024-09-12 ENCOUNTER — TELEPHONE (OUTPATIENT)
Age: 89
End: 2024-09-12

## 2024-09-12 NOTE — TELEPHONE ENCOUNTER
[facility] calling will be faxing D/C order for Rx: Colace as patient is refusing to take medication.      Please review.  Thank you

## 2024-09-13 DIAGNOSIS — F41.9 ANXIETY: ICD-10-CM

## 2024-09-13 RX ORDER — ALPRAZOLAM 0.25 MG
TABLET ORAL
Qty: 90 TABLET | Refills: 0 | Status: SHIPPED | OUTPATIENT
Start: 2024-09-13

## 2024-09-18 DIAGNOSIS — I10 PRIMARY HYPERTENSION: ICD-10-CM

## 2024-09-18 RX ORDER — AMLODIPINE BESYLATE 5 MG/1
TABLET ORAL
Qty: 90 TABLET | Refills: 1 | Status: SHIPPED | OUTPATIENT
Start: 2024-09-18

## 2024-09-25 ENCOUNTER — TELEPHONE (OUTPATIENT)
Age: 89
End: 2024-09-25

## 2024-09-25 DIAGNOSIS — L85.3 DRY SKIN: ICD-10-CM

## 2024-09-25 RX ORDER — PETROLATUM,WHITE 41 %
OINTMENT (GRAM) TOPICAL
Qty: 454 G | Refills: 4 | Status: SHIPPED | OUTPATIENT
Start: 2024-09-25

## 2024-09-26 DIAGNOSIS — F41.9 ANXIETY: ICD-10-CM

## 2024-09-26 RX ORDER — ALPRAZOLAM 0.25 MG/1
TABLET ORAL
Qty: 90 TABLET | Refills: 0 | Status: SHIPPED | OUTPATIENT
Start: 2024-09-26 | End: 2024-10-08

## 2024-10-02 NOTE — TELEPHONE ENCOUNTER
[facility] calling as they will be using a new pharmacy and will need script for patients alprazolam sent to [facility] by 10/7. Made aware that script was already forwarded to them on 9/26.

## 2024-10-04 DIAGNOSIS — K59.00 CONSTIPATION, UNSPECIFIED CONSTIPATION TYPE: Primary | ICD-10-CM

## 2024-10-04 RX ORDER — POLYETHYLENE GLYCOL 3350 17 G/17G
17 POWDER, FOR SOLUTION ORAL DAILY PRN
Qty: 116 G | Refills: 2 | Status: SHIPPED | OUTPATIENT
Start: 2024-10-04

## 2024-10-08 DIAGNOSIS — F41.9 ANXIETY: ICD-10-CM

## 2024-10-08 RX ORDER — ALPRAZOLAM 0.25 MG
TABLET ORAL
Qty: 90 TABLET | Refills: 5 | Status: SHIPPED | OUTPATIENT
Start: 2024-10-08

## 2024-10-31 NOTE — ASSESSMENT & PLAN NOTE
Assessment of the patient's hypertension indicates improved blood pressure control recommend continuation of lisinopril at 40 mg daily and amlodipine 5 mg daily  Detail Level: Zone

## 2024-11-08 ENCOUNTER — OFFICE VISIT (OUTPATIENT)
Age: 89
End: 2024-11-08
Payer: MEDICARE

## 2024-11-08 VITALS
BODY MASS INDEX: 31.41 KG/M2 | SYSTOLIC BLOOD PRESSURE: 124 MMHG | DIASTOLIC BLOOD PRESSURE: 72 MMHG | HEART RATE: 90 BPM | TEMPERATURE: 97 F | HEIGHT: 64 IN | OXYGEN SATURATION: 95 %

## 2024-11-08 DIAGNOSIS — G89.29 CHRONIC RIGHT SHOULDER PAIN: ICD-10-CM

## 2024-11-08 DIAGNOSIS — M25.511 CHRONIC RIGHT SHOULDER PAIN: ICD-10-CM

## 2024-11-08 DIAGNOSIS — R14.3 FLATULENCE: ICD-10-CM

## 2024-11-08 DIAGNOSIS — M19.90 ARTHRITIS: ICD-10-CM

## 2024-11-08 DIAGNOSIS — I10 PRIMARY HYPERTENSION: Primary | ICD-10-CM

## 2024-11-08 DIAGNOSIS — E78.5 HYPERLIPIDEMIA, UNSPECIFIED HYPERLIPIDEMIA TYPE: ICD-10-CM

## 2024-11-08 DIAGNOSIS — F41.9 ANXIETY: ICD-10-CM

## 2024-11-08 DIAGNOSIS — L85.3 DRY SKIN: ICD-10-CM

## 2024-11-08 DIAGNOSIS — Z13.0 SCREENING FOR DEFICIENCY ANEMIA: ICD-10-CM

## 2024-11-08 DIAGNOSIS — N95.2 VAGINAL ATROPHY: ICD-10-CM

## 2024-11-08 PROCEDURE — G0439 PPPS, SUBSEQ VISIT: HCPCS | Performed by: INTERNAL MEDICINE

## 2024-11-08 PROCEDURE — 99214 OFFICE O/P EST MOD 30 MIN: CPT | Performed by: INTERNAL MEDICINE

## 2024-11-08 RX ORDER — SIMETHICONE 180 MG
180 CAPSULE ORAL 4 TIMES DAILY
Qty: 120 CAPSULE | Refills: 6 | Status: SHIPPED | OUTPATIENT
Start: 2024-11-08 | End: 2024-11-12 | Stop reason: SDUPTHER

## 2024-11-08 RX ORDER — CONJUGATED ESTROGENS 0.62 MG/G
CREAM VAGINAL
Qty: 30 G | Refills: 5 | Status: SHIPPED | OUTPATIENT
Start: 2024-11-08 | End: 2024-11-12 | Stop reason: SDUPTHER

## 2024-11-08 NOTE — ASSESSMENT & PLAN NOTE
Intestinal bloating and flatulence possibly a lactose intolerance discussed with patient trial of trying to read eliminate dairy products temporarily to see if it helps.  Will initiate some sign method Cone supplementation 4 times a day to see if it helps with flatulence.    Orders:    simethicone (MYLICON,GAS-X) 180 MG capsule; Take 1 capsule (180 mg total) by mouth 4 (four) times a day Patient may have at bedside

## 2024-11-08 NOTE — PROGRESS NOTES
Ambulatory Visit  Name: Denisse Jolly      : 1935      MRN: [id number]  Encounter Provider: Dustin Baeza MD  Encounter Date: 2024   Encounter department: [facility] INTERNAL MEDICINE    Assessment & Plan  Flatulence  Intestinal bloating and flatulence possibly a lactose intolerance discussed with patient trial of trying to read eliminate dairy products temporarily to see if it helps.  Will initiate some sign method Cone supplementation 4 times a day to see if it helps with flatulence.    Orders:    simethicone (MYLICON,GAS-X) 180 MG capsule; Take 1 capsule (180 mg total) by mouth 4 (four) times a day Patient may have at bedside    Primary hypertension  Blood pressure assessment today confirms good control recommend continuation of current therapy her blood pressure including amlodipine 5 mg daily and lisinopril 40 mg daily.  A comprehensive metabolic profile has been requested to evaluate kidney performance and electrolyte balance.    Orders:    Comprehensive metabolic panel; Future    Comprehensive metabolic panel    Hyperlipidemia, unspecified hyperlipidemia type    Orders:    Lipid panel; Future    Lipid panel    Screening for deficiency anemia    Orders:    CBC and differential; Future    CBC and differential    Dry skin         Vaginal atrophy  Continue estrogen supplementation 1/4 inch 3 days a week    Orders:    estrogens, conjugated (Premarin) vaginal cream; Apply 1/4 inch with finger to vaginal area at bedtime on ,   and     Anxiety  Patient continues to do well with alprazolam 0.25 mg 1 in the morning and 2 tablets at bedtime.  No impairment noted.         Chronic right shoulder pain  Chronic right shoulder pain with advanced degenerative changes.  Continue meloxicam 7.5 mg daily for pain control         Arthritis  Arthritis of both knees reviewed today significantly advanced.  Walking with a walker at all times to minimize risk of falls.   Continue meloxicam 7.5 mg daily and as needed use of 4% lidocaine patches.            Preventive health issues were discussed with patient, and age appropriate screening tests were ordered as noted in patient's After Visit Summary. Personalized health advice and appropriate referrals for health education or preventive services given if needed, as noted in patient's After Visit Summary.    History of Present Illness     This pleasant 89-year-old female patient returns to our office today for an annual physical examination.  Review of systems indicates patient continues to experience chronic pain in her right shoulder as well as chronic pain in both knees.  She presents today ambulating with the assistance of a walker.  Has had no recent falls of note.  She also notes that she has had an increase in bloating in her abdomen.  Indicates no change in bowel habits.  No nausea vomiting symptoms are appreciated.  She denies any recent chest pain palpitations or shortness of breath.  She did receive both the COVID-vaccine and flu vaccine at the [facility] where she resides       Patient Care Team:  Dustin Baeza MD as PCP - General  MD Lori Mcwilliams PA-C    Review of Systems   Gastrointestinal:  Positive for abdominal distention.        Increase in intestinal flatulence   Musculoskeletal:  Positive for arthralgias.        Chronic arthritis symptoms in the knees bilaterally as well as the right shoulder   All other systems reviewed and are negative.    Medical History Reviewed by provider this encounter:  Meds  Problems       Annual Wellness Visit Questionnaire   Denisse is here for her Subsequent Wellness visit. Last Medicare Wellness visit information reviewed, patient interviewed, no change since last AWV.     Health Risk Assessment:   Patient rates overall health as fair. Patient feels that their physical health rating is slightly worse. Patient is satisfied with their life. Eyesight was  rated as slightly worse. Hearing was rated as same. Patient feels that their emotional and mental health rating is same. Patients states they are never, rarely angry. Patient states they are sometimes unusually tired/fatigued. Pain experienced in the last 7 days has been none. Patient states that she has experienced no weight loss or gain in last 6 months.     Depression Screening:   PHQ-9 Score: 0      Fall Risk Screening:   In the past year, patient has experienced: no history of falling in past year      Urinary Incontinence Screening:   Patient has not leaked urine accidently in the last six months.     Home Safety:  Patient has trouble with stairs inside or outside of their home. Patient has working smoke alarms Home safety hazards include: none.     Nutrition:   Current diet is Regular.     Medications:   Patient is currently taking over-the-counter supplements. OTC medications include: see medication list. Patient is not able to manage medications.     Activities of Daily Living (ADLs)/Instrumental Activities of Daily Living (IADLs):   Walk and transfer into and out of bed and chair?: Yes  Dress and groom yourself?: Yes    Bathe or shower yourself?: Yes    Feed yourself? Yes  Do your laundry/housekeeping?: Yes  Manage your money, pay your bills and track your expenses?: Yes  Make your own meals?: Yes    Do your own shopping?: Yes    Previous Hospitalizations:   Any hospitalizations or ED visits within the last 12 months?: No      Advance Care Planning:   Living will: Yes    Durable POA for healthcare: Yes    Advanced directive: Yes      PREVENTIVE SCREENINGS      Cardiovascular Screening:    General: Screening Not Indicated and History Lipid Disorder      Colorectal Cancer Screening:     General: Screening Not Indicated      Cervical Cancer Screening:    General: Screening Not Indicated      Lung Cancer Screening:     General: Screening Not Indicated      Hepatitis C Screening:    General: Screening  "Current    Social Determinants of Health     Financial Resource Strain: Low Risk  (9/20/2023)    Overall Financial Resource Strain (CARDIA)     Difficulty of Paying Living Expenses: Not very hard   Food Insecurity: No Food Insecurity (11/8/2024)    Hunger Vital Sign     Worried About Running Out of Food in the Last Year: Never true     Ran Out of Food in the Last Year: Never true   Transportation Needs: No Transportation Needs (11/8/2024)    PRAPARE - Transportation     Lack of Transportation (Medical): No     Lack of Transportation (Non-Medical): No   Housing Stability: Low Risk  (11/8/2024)    Housing Stability Vital Sign     Unable to Pay for Housing in the Last Year: No     Number of Times Moved in the Last Year: 0     Homeless in the Last Year: No   Utilities: Not At Risk (11/8/2024)    [facility] Utilities     Threatened with loss of utilities: No     No results found.    Objective     /72   Pulse 90   Temp (!) 97 °F (36.1 °C) (Tympanic)   Ht 5' 4\" (1.626 m)   SpO2 95%   BMI 31.41 kg/m²     Physical Exam  Vitals and nursing note reviewed.   Constitutional:       General: She is not in acute distress.     Appearance: She is well-developed.   HENT:      Head: Normocephalic and atraumatic.      Right Ear: Tympanic membrane, ear canal and external ear normal.      Left Ear: Tympanic membrane, ear canal and external ear normal.      Nose: Nose normal.      Mouth/Throat:      Mouth: Mucous membranes are moist.      Pharynx: Oropharynx is clear.   Eyes:      Conjunctiva/sclera: Conjunctivae normal.      Pupils: Pupils are equal, round, and reactive to light.   Neck:      Vascular: No carotid bruit.   Cardiovascular:      Rate and Rhythm: Normal rate and regular rhythm.      Heart sounds: Murmur heard.   Pulmonary:      Effort: Pulmonary effort is normal. No respiratory distress.      Breath sounds: Normal breath sounds. No wheezing, rhonchi or rales.   Abdominal:      General: Abdomen is flat. Bowel sounds are " normal. There is no distension.      Palpations: Abdomen is soft. There is no mass.      Tenderness: There is no abdominal tenderness.   Musculoskeletal:         General: No swelling.      Cervical back: Normal range of motion and neck supple. No rigidity or tenderness.      Right lower leg: No edema.      Left lower leg: No edema.   Lymphadenopathy:      Cervical: No cervical adenopathy.   Skin:     General: Skin is warm and dry.      Capillary Refill: Capillary refill takes less than 2 seconds.      Coloration: Skin is not jaundiced or pale.   Neurological:      General: No focal deficit present.      Mental Status: She is alert. Mental status is at baseline.   Psychiatric:         Mood and Affect: Mood normal.         Behavior: Behavior normal.         Thought Content: Thought content normal.         Judgment: Judgment normal.

## 2024-11-08 NOTE — ASSESSMENT & PLAN NOTE
Chronic right shoulder pain with advanced degenerative changes.  Continue meloxicam 7.5 mg daily for pain control

## 2024-11-08 NOTE — ASSESSMENT & PLAN NOTE
Blood pressure assessment today confirms good control recommend continuation of current therapy her blood pressure including amlodipine 5 mg daily and lisinopril 40 mg daily.  A comprehensive metabolic profile has been requested to evaluate kidney performance and electrolyte balance.    Orders:    Comprehensive metabolic panel; Future    Comprehensive metabolic panel

## 2024-11-08 NOTE — ASSESSMENT & PLAN NOTE
Patient continues to do well with alprazolam 0.25 mg 1 in the morning and 2 tablets at bedtime.  No impairment noted.

## 2024-11-08 NOTE — PATIENT INSTRUCTIONS
Medicare Preventive Visit Patient Instructions  Thank you for completing your Welcome to Medicare Visit or Medicare Annual Wellness Visit today. Your next wellness visit will be due in one year (11/9/2025).  The screening/preventive services that you may require over the next 5-10 years are detailed below. Some tests may not apply to you based off risk factors and/or age. Screening tests ordered at today's visit but not completed yet may show as past due. Also, please note that scanned in results may not display below.  Preventive Screenings:  Service Recommendations Previous Testing/Comments   Colorectal Cancer Screening  * Colonoscopy    * Fecal Occult Blood Test (FOBT)/Fecal Immunochemical Test (FIT)  * Fecal DNA/Cologuard Test  * Flexible Sigmoidoscopy Age: 45-75 years old   Colonoscopy: every 10 years (may be performed more frequently if at higher risk)  OR  FOBT/FIT: every 1 year  OR  Cologuard: every 3 years  OR  Sigmoidoscopy: every 5 years  Screening may be recommended earlier than age 45 if at higher risk for colorectal cancer. Also, an individualized decision between you and your healthcare provider will decide whether screening between the ages of 76-85 would be appropriate. Colonoscopy: Not on file  FOBT/FIT: Not on file  Cologuard: Not on file  Sigmoidoscopy: Not on file    Screening Not Indicated     Breast Cancer Screening Age: 40+ years old  Frequency: every 1-2 years  Not required if history of left and right mastectomy Mammogram: 02/14/2019        Cervical Cancer Screening Between the ages of 21-29, pap smear recommended once every 3 years.   Between the ages of 30-65, can perform pap smear with HPV co-testing every 5 years.   Recommendations may differ for women with a history of total hysterectomy, cervical cancer, or abnormal pap smears in past. Pap Smear: Not on file    Screening Not Indicated   Hepatitis C Screening Once for adults born between 1945 and 1965  More frequently in patients at  high risk for Hepatitis C Hep C Antibody: 04/20/2021    Screening Current   Diabetes Screening 1-2 times per year if you're at risk for diabetes or have pre-diabetes Fasting glucose: 102 mg/dL (8/16/2022)  A1C: No results in last 5 years (No results in last 5 years)      Cholesterol Screening Once every 5 years if you don't have a lipid disorder. May order more often based on risk factors. Lipid panel: 09/22/2023    Screening Not Indicated  History Lipid Disorder     Other Preventive Screenings Covered by Medicare:  Abdominal Aortic Aneurysm (AAA) Screening: covered once if your at risk. You're considered to be at risk if you have a family history of AAA.  Lung Cancer Screening: covers low dose CT scan once per year if you meet all of the following conditions: (1) Age 55-77; (2) No signs or symptoms of lung cancer; (3) Current smoker or have quit smoking within the last 15 years; (4) You have a tobacco smoking history of at least 20 pack years (packs per day multiplied by number of years you smoked); (5) You get a written order from a healthcare provider.  Glaucoma Screening: covered annually if you're considered high risk: (1) You have diabetes OR (2) Family history of glaucoma OR (3)  aged 50 and older OR (4)  American aged 65 and older  Osteoporosis Screening: covered every 2 years if you meet one of the following conditions: (1) You're estrogen deficient and at risk for osteoporosis based off medical history and other findings; (2) Have a vertebral abnormality; (3) On glucocorticoid therapy for more than 3 months; (4) Have primary hyperparathyroidism; (5) On osteoporosis medications and need to assess response to drug therapy.   Last bone density test (DXA Scan): Not on file.  HIV Screening: covered annually if you're between the age of 15-65. Also covered annually if you are younger than 15 and older than 65 with risk factors for HIV infection. For pregnant patients, it is covered up to  3 times per pregnancy.    Immunizations:  Immunization Recommendations   Influenza Vaccine Annual influenza vaccination during flu season is recommended for all persons aged >= 6 months who do not have contraindications   Pneumococcal Vaccine   * Pneumococcal conjugate vaccine = PCV13 (Prevnar 13), PCV15 (Vaxneuvance), PCV20 (Prevnar 20)  * Pneumococcal polysaccharide vaccine = PPSV23 (Pneumovax) Adults 19-63 yo with certain risk factors or if 65+ yo  If never received any pneumonia vaccine: recommend Prevnar 20 (PCV20)  Give PCV20 if previously received 1 dose of PCV13 or PPSV23   Hepatitis B Vaccine 3 dose series if at intermediate or high risk (ex: diabetes, end stage renal disease, liver disease)   Respiratory syncytial virus (RSV) Vaccine - COVERED BY MEDICARE PART D  * RSVPreF3 (Arexvy) CDC recommends that adults 60 years of age and older may receive a single dose of RSV vaccine using shared clinical decision-making (SCDM)   Tetanus (Td) Vaccine - COST NOT COVERED BY MEDICARE PART B Following completion of primary series, a booster dose should be given every 10 years to maintain immunity against tetanus. Td may also be given as tetanus wound prophylaxis.   Tdap Vaccine - COST NOT COVERED BY MEDICARE PART B Recommended at least once for all adults. For pregnant patients, recommended with each pregnancy.   Shingles Vaccine (Shingrix) - COST NOT COVERED BY MEDICARE PART B  2 shot series recommended in those 19 years and older who have or will have weakened immune systems or those 50 years and older     Health Maintenance Due:      Topic Date Due   • Hepatitis C Screening  Completed     Immunizations Due:      Topic Date Due   • Pneumococcal Vaccine: 65+ Years (2 of 2 - PPSV23 or PCV20) 02/17/2018   • Influenza Vaccine (1) 09/01/2024     Advance Directives   What are advance directives?  Advance directives are legal documents that state your wishes and plans for medical care. These plans are made ahead of time in  case you lose your ability to make decisions for yourself. Advance directives can apply to any medical decision, such as the treatments you want, and if you want to donate organs.   What are the types of advance directives?  There are many types of advance directives, and each state has rules about how to use them. You may choose a combination of any of the following:  Living will:  This is a written record of the treatment you want. You can also choose which treatments you do not want, which to limit, and which to stop at a certain time. This includes surgery, medicine, IV fluid, and tube feedings.   Durable power of  for healthcare (DPAHC):  This is a written record that states who you want to make healthcare choices for you when you are unable to make them for yourself. This person, called a proxy, is usually a family member or a friend. You may choose more than 1 proxy.  Do not resuscitate (DNR) order:  A DNR order is used in case your heart stops beating or you stop breathing. It is a request not to have certain forms of treatment, such as CPR. A DNR order may be included in other types of advance directives.  Medical directive:  This covers the care that you want if you are in a coma, near death, or unable to make decisions for yourself. You can list the treatments you want for each condition. Treatment may include pain medicine, surgery, blood transfusions, dialysis, IV or tube feedings, and a ventilator (breathing machine).  Values history:  This document has questions about your views, beliefs, and how you feel and think about life. This information can help others choose the care that you would choose.  Why are advance directives important?  An advance directive helps you control your care. Although spoken wishes may be used, it is better to have your wishes written down. Spoken wishes can be misunderstood, or not followed. Treatments may be given even if you do not want them. An advance directive  may make it easier for your family to make difficult choices about your care.   Weight Management   Why it is important to manage your weight:  Being overweight increases your risk of health conditions such as heart disease, high blood pressure, type 2 diabetes, and certain types of cancer. It can also increase your risk for osteoarthritis, sleep apnea, and other respiratory problems. Aim for a slow, steady weight loss. Even a small amount of weight loss can lower your risk of health problems.  How to lose weight safely:  A safe and healthy way to lose weight is to eat fewer calories and get regular exercise. You can lose up about 1 pound a week by decreasing the number of calories you eat by 500 calories each day.   Healthy meal plan for weight management:  A healthy meal plan includes a variety of foods, contains fewer calories, and helps you stay healthy. A healthy meal plan includes the following:  Eat whole-grain foods more often.  A healthy meal plan should contain fiber. Fiber is the part of grains, fruits, and vegetables that is not broken down by your body. Whole-grain foods are healthy and provide extra fiber in your diet. Some examples of whole-grain foods are whole-wheat breads and pastas, oatmeal, brown rice, and bulgur.  Eat a variety of vegetables every day.  Include dark, leafy greens such as spinach, kale, prashant greens, and mustard greens. Eat yellow and orange vegetables such as carrots, sweet potatoes, and winter squash.   Eat a variety of fruits every day.  Choose fresh or canned fruit (canned in its own juice or light syrup) instead of juice. Fruit juice has very little or no fiber.  Eat low-fat dairy foods.  Drink fat-free (skim) milk or 1% milk. Eat fat-free yogurt and low-fat cottage cheese. Try low-fat cheeses such as mozzarella and other reduced-fat cheeses.  Choose meat and other protein foods that are low in fat.  Choose beans or other legumes such as split peas or lentils. Choose fish,  skinless poultry (chicken or turkey), or lean cuts of red meat (beef or pork). Before you cook meat or poultry, cut off any visible fat.   Use less fat and oil.  Try baking foods instead of frying them. Add less fat, such as margarine, sour cream, regular salad dressing and mayonnaise to foods. Eat fewer high-fat foods. Some examples of high-fat foods include french fries, doughnuts, ice cream, and cakes.  Eat fewer sweets.  Limit foods and drinks that are high in sugar. This includes candy, cookies, regular soda, and sweetened drinks.  Exercise:  Exercise at least 30 minutes per day on most days of the week. Some examples of exercise include walking, biking, dancing, and swimming. You can also fit in more physical activity by taking the stairs instead of the elevator or parking farther away from stores. Ask your healthcare provider about the best exercise plan for you.      © Copyright [employer] 2018 Information is for End User's use only and may not be sold, redistributed or otherwise used for commercial purposes. All illustrations and images included in CareNotes® are the copyrighted property of [employer]M., Inc. or [employer]

## 2024-11-08 NOTE — ASSESSMENT & PLAN NOTE
Arthritis of both knees reviewed today significantly advanced.  Walking with a walker at all times to minimize risk of falls.  Continue meloxicam 7.5 mg daily and as needed use of 4% lidocaine patches.

## 2024-11-08 NOTE — ASSESSMENT & PLAN NOTE
Continue estrogen supplementation 1/4 inch 3 days a week    Orders:    estrogens, conjugated (Premarin) vaginal cream; Apply 1/4 inch with finger to vaginal area at bedtime on Mondays, Wednesdays  and Fridays

## 2024-11-12 ENCOUNTER — TELEPHONE (OUTPATIENT)
Age: 89
End: 2024-11-12

## 2024-11-12 DIAGNOSIS — N95.2 VAGINAL ATROPHY: ICD-10-CM

## 2024-11-12 DIAGNOSIS — R14.3 FLATULENCE: ICD-10-CM

## 2024-11-12 RX ORDER — SIMETHICONE 180 MG
180 CAPSULE ORAL 4 TIMES DAILY
Qty: 120 CAPSULE | Refills: 6 | Status: SHIPPED | OUTPATIENT
Start: 2024-11-12

## 2024-11-12 RX ORDER — CONJUGATED ESTROGENS 0.62 MG/G
CREAM VAGINAL
Qty: 30 G | Refills: 5 | Status: SHIPPED | OUTPATIENT
Start: 2024-11-12

## 2024-11-12 NOTE — TELEPHONE ENCOUNTER
Umu arndt [facility] in [place] called to update the office regarding new pharmacy. New pharmacy is [facility] - [place], [place] - [address] [place] [address]. She provided phone number [phone]  and fax number [phone].    She also asked for the medications that were sent out 11/08/24 to be redirected to [facility]. Simethicone and estrogens, conjugated (premarin).

## 2024-11-19 ENCOUNTER — TELEPHONE (OUTPATIENT)
Age: 89
End: 2024-11-19

## 2024-11-19 NOTE — TELEPHONE ENCOUNTER
Umu aviles call to schedule an appointment for this patient. She just wanted to inform Dr. Baeza that the patient scheduled an appointment to recheck the pain she was having in her shoulder. And wanted to know if the doctor wanted to see her in the office or if he wanted to refer her to a specialist for her to see instead. If any questions please follow up with mary beth aviles     thank you

## 2024-11-19 NOTE — TELEPHONE ENCOUNTER
You can schedule her to see me for shoulder pain she has been into see several orthopedic surgeons they cannot do anything else for the shoulder.  Can review her current medications try to come up with a pain medication strategy.  Thank you

## 2024-11-26 ENCOUNTER — OFFICE VISIT (OUTPATIENT)
Age: 89
End: 2024-11-26
Payer: MEDICARE

## 2024-11-26 VITALS
DIASTOLIC BLOOD PRESSURE: 70 MMHG | BODY MASS INDEX: 31.41 KG/M2 | TEMPERATURE: 97 F | HEART RATE: 89 BPM | SYSTOLIC BLOOD PRESSURE: 126 MMHG | HEIGHT: 64 IN | OXYGEN SATURATION: 95 %

## 2024-11-26 DIAGNOSIS — G89.29 CHRONIC RIGHT SHOULDER PAIN: Primary | ICD-10-CM

## 2024-11-26 DIAGNOSIS — M17.0 PRIMARY OSTEOARTHRITIS OF BOTH KNEES: ICD-10-CM

## 2024-11-26 DIAGNOSIS — I10 PRIMARY HYPERTENSION: ICD-10-CM

## 2024-11-26 DIAGNOSIS — M25.511 CHRONIC RIGHT SHOULDER PAIN: Primary | ICD-10-CM

## 2024-11-26 DIAGNOSIS — M19.90 ARTHRITIS: ICD-10-CM

## 2024-11-26 PROBLEM — T78.40XA ALLERGIC RASH PRESENT ON EXAMINATION: Status: RESOLVED | Noted: 2024-02-21 | Resolved: 2024-11-26

## 2024-11-26 PROCEDURE — G2211 COMPLEX E/M VISIT ADD ON: HCPCS | Performed by: INTERNAL MEDICINE

## 2024-11-26 PROCEDURE — 99214 OFFICE O/P EST MOD 30 MIN: CPT | Performed by: INTERNAL MEDICINE

## 2024-11-26 RX ORDER — GABAPENTIN 100 MG/1
100 CAPSULE ORAL 2 TIMES DAILY
Qty: 60 CAPSULE | Refills: 5 | Status: SHIPPED | OUTPATIENT
Start: 2024-11-26

## 2024-11-26 NOTE — ASSESSMENT & PLAN NOTE
Orders:    gabapentin (NEURONTIN) 100 mg capsule; Take 1 capsule (100 mg total) by mouth 2 (two) times a day

## 2024-11-26 NOTE — ASSESSMENT & PLAN NOTE
Arthritis of both knees reviewed will initiate gabapentin for shoulder arthritis may have benefit to the knees as well

## 2024-11-26 NOTE — ASSESSMENT & PLAN NOTE
Blood pressure assessment today confirms good control reading 126/70 and I recommend continuation of amlodipine at 5 mg daily along with lisinopril 40 mg daily

## 2024-11-26 NOTE — PROGRESS NOTES
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  Previous attempts at controlling pain have been of limited success.  Would avoid narcotics given the patient's advanced age and increased risk for falls as well as side effects of narcotics.  We have not tried putting the patient on gabapentin in the past for this pain and I have recommended that the patient start a trial of gabapentin 100 mg twice a day she is agreeable to taking the medication I will see her in 2 weeks for follow-up assessment.  She knows if she has any side effects that she experiences once starting medication to notify me.         Primary osteoarthritis of both knees  Arthritis of both knees reviewed will initiate gabapentin for shoulder arthritis may have benefit to the knees as well              History of Present Illness     This most pleasant 89-year-old female patient returns to our office today for evaluation of increasing right shoulder pain.  This is a chronic condition previous imaging has revealed significant with Radick process of the right shoulder.  She returns today for advice on how to control the discomfort.  She indicates that while she has had no recent falls or trauma to the shoulder it seems as though her shoulder pain level has increased lately.  This might be related to the cooling  temperatures of the fall into the winter season.  She has continued very limited range of motion of the shoulder due to the advanced arthritis process.  There is some erythema of the skin overlying the shoulder joint which I suspect is referred from the underlying arthritic process.  No increased warmth in the skin to indicate any type of cellulitis.    Patient also indicates that our recommendation for simethicone to reduce bowel gas and bowel distention has not been particularly successful.  Of asked her to continue this for another 2 weeks till she returns for follow-up of her shoulder pain will reassess at that time    Shoulder Pain       Review of Systems   Gastrointestinal:         Bowel distention and flatulence   Musculoskeletal:         Right side shoulder pain constant and affecting quality of life for the patient as well as noted reduction in range of motion of the shoulder   All other systems reviewed and are negative.    Past Medical History:   Diagnosis Date    Epistaxis     RESOLVED: 2/18/17    Saphenous vein thrombophlebitis, right 2012     Past Surgical History:   Procedure Laterality Date    GALLBLADDER SURGERY  2006     Family History   Problem Relation Age of Onset    Hypertension Mother      Social History     Tobacco Use    Smoking status: Never    Smokeless tobacco: Never   Vaping Use    Vaping status: Never Used   Substance and Sexual Activity    Alcohol use: Not Currently    Drug use: Never    Sexual activity: Not Currently     Current Outpatient Medications on File Prior to Visit   Medication Sig    Acetaminophen Extra Strength 500 MG TABS TAKE 2 CAPLETS (1000MG) ORALLY EVERY 8 HOURS (OSTEOARTHRITIS) *NOT TO EXCEED 4GMS APAP/24HR*    Allergy Relief 10 MG tablet GIVE 1 TABLET ORALLY DAILY (ALLERGIES)    ALPRAZolam (XANAX) 0.25 mg tablet TAKE 1 TABLET ORALLY IN THE MORNING (ANXIETY) (CONTROL) *REORDER*;TAKE 2 TABLETS (0.5MG) ORALLY AT BEDTIME (ANXIETY) (CONTROL) *REORDER*    amLODIPine  (NORVASC) 5 mg tablet TAKE 1 TABLET ORALLY DAILY (HYPERTENSION) *REORDER*    aspirin 81 mg chewable tablet CHEW 1 TABLET AND SWALLOW ORALLY THREE TIMES WEEKLY MONDAY, WEDNESDAY & FRIDAY (CARDIAC DISEASE)    calcium carbonate (TUMS) 500 mg chewable tablet Chew 1 tablet daily    Chest Congestion Relief 100 MG/5ML oral liquid GIVE 10ML (200MG) ORALLY THREE TIMES DAILY AS NEEDED FOR COUGH *REORDER*    ciclopirox (LOPROX) 0.77 % cream     cyclopentolate (CYCLOGYL) 1 % ophthalmic solution INSTILL 1 DROP INTO RIGHT EYE THREE TIMES DAILY (DETACHED RENTINAL SURGERY) *REORDER*    docusate sodium (COLACE) 100 mg capsule Take 1 capsule (100 mg total) by mouth 2 (two) times a day    Emollient (Eucerin Advanced Repair) CREA Apply topically to both upper extremities twice a day for dry skin    ergocalciferol (VITAMIN D2) 50,000 units GIVE 1 CAPSULE ORALLY EVERY WEEK (SUPPLEMENT) (DO NOT CRUSH) *REORDER*    estrogens, conjugated (Premarin) vaginal cream Apply 1/4 inch with finger to vaginal area at bedtime on Mondays, Wednesdays  and Fridays    famotidine (PEPCID) 20 mg tablet GIVE 1 TABLET ORALLY DAILY (GASTROESOPHAGEAL REFLUX DISEASE)    fluticasone (FLONASE) 50 mcg/act nasal spray INHALE 1 SPRAY INTO EACH NOSTRIL TWICE DAILY (ALLERGIES) *REORDER*    ketoconazole (NIZORAL) 2 % shampoo     Lidocaine Pain Relief 4 % PTCH APPLY 1/2 PATCH TOPICALLY TO EACH KNEE AS NEEDED FOR PAIN REMOVE AFTER 12 HOURS *REORDER*;APPLY 1 PATCH TOPICALLY TO RIGHT SHOULDER AS NEEDED FOR PAIN  REMOVE AFTER 12 HOURS *REORDER*    lisinopril (ZESTRIL) 40 mg tablet GIVE 1 TABLET ORALLY DAILY (HYPERTENSION)    Lumigan 0.01 % ophthalmic drops     meloxicam (MOBIC) 7.5 mg tablet TAKE 1 TABLET ORALLY IN THE MORNING WITH MEAL    mometasone (ELOCON) 0.1 % cream APPLY TOPICALLY TO AFFECTED AREA AT BEDTIME (SKIN RASH) *REORDER*    Mouthwashes (Biotene Dry Mouth) LIQD USE TO RINSE MOUTH TWICE DAILY FOR DRY MOUTH *REORDER*    nystatin (MYCOSTATIN) powder Apply  "topically 2 (two) times a day    polyethylene glycol (GLYCOLAX) 17 GM/SCOOP powder Take 17 g by mouth daily as needed (for constipation)    Restasis 0.05 % ophthalmic emulsion INSTILL 1 DROP INTO EACH EYE TWICE DAILY FOR DRY EYE *BRAND PER INSURANCE* *REORDER*    Simbrinza 1-0.2 % SUSP     simethicone (MYLICON,GAS-X) 180 MG capsule Take 1 capsule (180 mg total) by mouth 4 (four) times a day Patient may have at bedside    sodium chloride (OCEAN) 0.65 % nasal spray 1 spray into each nostril 3 (three) times a day     Allergies   Allergen Reactions    Acetazolamide     Ciprofloxacin Headache and Nausea Only    Nitrofurantoin     Pollen Extract     Sulfa Antibiotics     Keflex [Cephalexin] Itching     Immunization History   Administered Date(s) Administered    COVID-19 PFIZER VACCINE 0.3 ML IM 02/03/2021, 02/26/2021, 01/22/2022    COVID-19 Pfizer mRNA vacc PF francesca-sucrose 12 yr and older (Comirnaty) 01/22/2024    INFLUENZA 11/09/2022, 09/20/2023    Influenza Split High Dose Preservative Free IM 10/18/2016, 10/16/2017    Influenza, high dose seasonal 0.7 mL 10/31/2018, 09/30/2019, 10/13/2020, 10/21/2021, 11/09/2022, 09/20/2023    Pneumococcal Conjugate 13-Valent 02/17/2017     Objective   /70   Pulse 89   Temp (!) 97 °F (36.1 °C) (Tympanic)   Ht 5' 4\" (1.626 m)   SpO2 95%   BMI 31.41 kg/m²     Physical Exam  Vitals and nursing note reviewed.   Constitutional:       General: She is not in acute distress.     Appearance: She is well-developed.   HENT:      Head: Normocephalic and atraumatic.   Eyes:      Conjunctiva/sclera: Conjunctivae normal.   Cardiovascular:      Rate and Rhythm: Normal rate and regular rhythm.      Heart sounds: Normal heart sounds. No murmur heard.  Pulmonary:      Effort: Pulmonary effort is normal. No respiratory distress.      Breath sounds: Normal breath sounds. No wheezing, rhonchi or rales.   Abdominal:      General: Bowel sounds are normal. There is distension.      Palpations: " Abdomen is soft. There is no mass.      Tenderness: There is no abdominal tenderness.   Musculoskeletal:      Cervical back: Neck supple.   Skin:     General: Skin is warm and dry.      Capillary Refill: Capillary refill takes less than 2 seconds.      Findings: Erythema present.   Neurological:      Mental Status: She is alert.   Psychiatric:         Mood and Affect: Mood normal.

## 2024-11-26 NOTE — ASSESSMENT & PLAN NOTE
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  Previous attempts at controlling pain have been of limited success.  Would avoid narcotics given the patient's advanced age and increased risk for falls as well as side effects of narcotics.  We have not tried putting the patient on gabapentin in the past for this pain and I have recommended that the patient start a trial of gabapentin 100 mg twice a day she is agreeable to taking the medication I will see her in 2 weeks for follow-up assessment.  She knows if she has any side effects that she experiences once starting medication to notify me.

## 2024-12-11 ENCOUNTER — OFFICE VISIT (OUTPATIENT)
Age: 89
End: 2024-12-11
Payer: MEDICARE

## 2024-12-11 VITALS
HEIGHT: 64 IN | TEMPERATURE: 96.9 F | SYSTOLIC BLOOD PRESSURE: 124 MMHG | HEART RATE: 78 BPM | OXYGEN SATURATION: 96 % | BODY MASS INDEX: 31.41 KG/M2 | DIASTOLIC BLOOD PRESSURE: 70 MMHG

## 2024-12-11 DIAGNOSIS — I10 PRIMARY HYPERTENSION: ICD-10-CM

## 2024-12-11 DIAGNOSIS — M25.562 CHRONIC PAIN OF BOTH KNEES: ICD-10-CM

## 2024-12-11 DIAGNOSIS — M25.511 CHRONIC RIGHT SHOULDER PAIN: Primary | ICD-10-CM

## 2024-12-11 DIAGNOSIS — M25.561 CHRONIC PAIN OF BOTH KNEES: ICD-10-CM

## 2024-12-11 DIAGNOSIS — G89.29 CHRONIC PAIN OF BOTH KNEES: ICD-10-CM

## 2024-12-11 DIAGNOSIS — G89.29 CHRONIC RIGHT SHOULDER PAIN: Primary | ICD-10-CM

## 2024-12-11 PROCEDURE — G2211 COMPLEX E/M VISIT ADD ON: HCPCS | Performed by: INTERNAL MEDICINE

## 2024-12-11 PROCEDURE — 99214 OFFICE O/P EST MOD 30 MIN: CPT | Performed by: INTERNAL MEDICINE

## 2024-12-11 RX ORDER — GABAPENTIN 100 MG/1
CAPSULE ORAL
Qty: 120 CAPSULE | Refills: 5 | Status: SHIPPED | OUTPATIENT
Start: 2024-12-11

## 2024-12-11 NOTE — PROGRESS NOTES
Name: Denisse Jolly      : 1935      MRN: [id number]  Encounter Provider: Dustin Baeza MD  Encounter Date: 2024   Encounter department: [facility] INTERNAL MEDICINE    Assessment & Plan  Chronic right shoulder pain  Chronic pain right shoulder due to advanced degenerative changes.  Will increase gabapentin to 100 mg in the morning and 100 mg in the afternoon and 200 mg at bedtime with follow-up in 2 weeks to reassess    Orders:    gabapentin (NEURONTIN) 100 mg capsule; 100 mg in the morning 100 mg in the afternoon and 200 mg at bedtime    Chronic pain of both knees  Pain in both knees examination today confirms likelihood of arthritic cause of her pain.  Updated x-rays have been requested she can continue to utilize lidocaine Transderm patches on her knees.  Will evaluate benefit of gabapentin on return visit in 2 weeks.  May be a candidate for steroid injections or viscous therapy for the knees depending upon x-ray results    Orders:    XR knee 3 vw left non injury; Future    XR knee 3 vw right non injury; Future    Primary hypertension  Blood pressure assessment confirms good control recommend continuation of lisinopril at 40 mg daily and amlodipine 5 mg daily              History of Present Illness     This 89-year-old female patient returns to our office today for a follow-up assessment.  The time of her last visit with us she presented for assistance in pain control for her chronic right shoulder pain.  She is currently on a combination of meloxicam 7.5 mg daily along with gabapentin which we started on her last visit 100 mg twice a day.  She indicates that she has had somewhat of an improvement in her pain but it is still present.    She relates that recently she started to have pain in both of her knees.  She has a history of arthritis in her knees currently using lidocaine patches topically to help decrease the pain in her knees.  I have requested updated x-rays of the  knees to evaluate the degree of arthritis in both knees.  On examination we find arthritic changes more severe on the left side than the right      Review of Systems   Musculoskeletal:  Positive for arthralgias.        Arthritic pain right shoulder and both knees   All other systems reviewed and are negative.    Past Medical History:   Diagnosis Date    Epistaxis     RESOLVED: 2/18/17    Saphenous vein thrombophlebitis, right 2012     Past Surgical History:   Procedure Laterality Date    GALLBLADDER SURGERY  2006     Family History   Problem Relation Age of Onset    Hypertension Mother      Social History     Tobacco Use    Smoking status: Never    Smokeless tobacco: Never   Vaping Use    Vaping status: Never Used   Substance and Sexual Activity    Alcohol use: Not Currently    Drug use: Never    Sexual activity: Not Currently     Current Outpatient Medications on File Prior to Visit   Medication Sig    Acetaminophen Extra Strength 500 MG TABS TAKE 2 CAPLETS (1000MG) ORALLY EVERY 8 HOURS (OSTEOARTHRITIS) *NOT TO EXCEED 4GMS APAP/24HR*    Allergy Relief 10 MG tablet GIVE 1 TABLET ORALLY DAILY (ALLERGIES)    ALPRAZolam (XANAX) 0.25 mg tablet TAKE 1 TABLET ORALLY IN THE MORNING (ANXIETY) (CONTROL) *REORDER*;TAKE 2 TABLETS (0.5MG) ORALLY AT BEDTIME (ANXIETY) (CONTROL) *REORDER*    amLODIPine (NORVASC) 5 mg tablet TAKE 1 TABLET ORALLY DAILY (HYPERTENSION) *REORDER*    aspirin 81 mg chewable tablet CHEW 1 TABLET AND SWALLOW ORALLY THREE TIMES WEEKLY MONDAY, WEDNESDAY & FRIDAY (CARDIAC DISEASE)    calcium carbonate (TUMS) 500 mg chewable tablet Chew 1 tablet daily    Chest Congestion Relief 100 MG/5ML oral liquid GIVE 10ML (200MG) ORALLY THREE TIMES DAILY AS NEEDED FOR COUGH *REORDER*    ciclopirox (LOPROX) 0.77 % cream     cyclopentolate (CYCLOGYL) 1 % ophthalmic solution INSTILL 1 DROP INTO RIGHT EYE THREE TIMES DAILY (DETACHED RENTINAL SURGERY) *REORDER*    docusate sodium (COLACE) 100 mg capsule Take 1 capsule (100 mg  total) by mouth 2 (two) times a day    Emollient (Eucerin Advanced Repair) CREA Apply topically to both upper extremities twice a day for dry skin    ergocalciferol (VITAMIN D2) 50,000 units GIVE 1 CAPSULE ORALLY EVERY WEEK (SUPPLEMENT) (DO NOT CRUSH) *REORDER*    estrogens, conjugated (Premarin) vaginal cream Apply 1/4 inch with finger to vaginal area at bedtime on Mondays, Wednesdays  and Fridays    famotidine (PEPCID) 20 mg tablet GIVE 1 TABLET ORALLY DAILY (GASTROESOPHAGEAL REFLUX DISEASE)    fluticasone (FLONASE) 50 mcg/act nasal spray INHALE 1 SPRAY INTO EACH NOSTRIL TWICE DAILY (ALLERGIES) *REORDER*    ketoconazole (NIZORAL) 2 % shampoo     Lidocaine Pain Relief 4 % PTCH APPLY 1/2 PATCH TOPICALLY TO EACH KNEE AS NEEDED FOR PAIN REMOVE AFTER 12 HOURS *REORDER*;APPLY 1 PATCH TOPICALLY TO RIGHT SHOULDER AS NEEDED FOR PAIN  REMOVE AFTER 12 HOURS *REORDER*    lisinopril (ZESTRIL) 40 mg tablet GIVE 1 TABLET ORALLY DAILY (HYPERTENSION)    Lumigan 0.01 % ophthalmic drops     meloxicam (MOBIC) 7.5 mg tablet TAKE 1 TABLET ORALLY IN THE MORNING WITH MEAL    mometasone (ELOCON) 0.1 % cream APPLY TOPICALLY TO AFFECTED AREA AT BEDTIME (SKIN RASH) *REORDER*    Mouthwashes (Biotene Dry Mouth) LIQD USE TO RINSE MOUTH TWICE DAILY FOR DRY MOUTH *REORDER*    nystatin (MYCOSTATIN) powder Apply topically 2 (two) times a day    polyethylene glycol (GLYCOLAX) 17 GM/SCOOP powder Take 17 g by mouth daily as needed (for constipation)    Restasis 0.05 % ophthalmic emulsion INSTILL 1 DROP INTO EACH EYE TWICE DAILY FOR DRY EYE *BRAND PER INSURANCE* *REORDER*    Simbrinza 1-0.2 % SUSP     simethicone (MYLICON,GAS-X) 180 MG capsule Take 1 capsule (180 mg total) by mouth 4 (four) times a day Patient may have at bedside    sodium chloride (OCEAN) 0.65 % nasal spray 1 spray into each nostril 3 (three) times a day    [DISCONTINUED] gabapentin (NEURONTIN) 100 mg capsule Take 1 capsule (100 mg total) by mouth 2 (two) times a day     Allergies  "  Allergen Reactions    Acetazolamide     Ciprofloxacin Headache and Nausea Only    Nitrofurantoin     Pollen Extract     Sulfa Antibiotics     Keflex [Cephalexin] Itching     Immunization History   Administered Date(s) Administered    COVID-19 PFIZER VACCINE 0.3 ML IM 02/03/2021, 02/26/2021, 01/22/2022    COVID-19 Pfizer mRNA vacc PF francesca-sucrose 12 yr and older (Comirnaty) 01/22/2024    INFLUENZA 11/09/2022, 09/20/2023    Influenza Split High Dose Preservative Free IM 10/18/2016, 10/16/2017    Influenza, high dose seasonal 0.7 mL 10/31/2018, 09/30/2019, 10/13/2020, 10/21/2021, 11/09/2022, 09/20/2023    Pneumococcal Conjugate 13-Valent 02/17/2017     Objective   /70   Pulse 78   Temp (!) 96.9 °F (36.1 °C) (Tympanic)   Ht 5' 4\" (1.626 m)   SpO2 96%   BMI 31.41 kg/m²     Physical Exam  Vitals and nursing note reviewed.   Constitutional:       General: She is not in acute distress.     Appearance: She is well-developed.   HENT:      Head: Normocephalic and atraumatic.   Eyes:      Conjunctiva/sclera: Conjunctivae normal.   Cardiovascular:      Rate and Rhythm: Normal rate and regular rhythm.      Heart sounds: Normal heart sounds. No murmur heard.  Pulmonary:      Effort: Pulmonary effort is normal. No respiratory distress.      Breath sounds: Normal breath sounds. No wheezing, rhonchi or rales.   Abdominal:      Palpations: Abdomen is soft.      Tenderness: There is no abdominal tenderness.   Musculoskeletal:         General: Swelling, tenderness and deformity present.      Cervical back: Neck supple.      Comments: Arthritic changes of both knees left worse than the right   Skin:     General: Skin is warm and dry.      Capillary Refill: Capillary refill takes less than 2 seconds.   Neurological:      Mental Status: She is alert.   Psychiatric:         Mood and Affect: Mood normal.         "

## 2024-12-11 NOTE — ASSESSMENT & PLAN NOTE
Blood pressure assessment confirms good control recommend continuation of lisinopril at 40 mg daily and amlodipine 5 mg daily

## 2024-12-11 NOTE — ASSESSMENT & PLAN NOTE
Chronic pain right shoulder due to advanced degenerative changes.  Will increase gabapentin to 100 mg in the morning and 100 mg in the afternoon and 200 mg at bedtime with follow-up in 2 weeks to reassess    Orders:    gabapentin (NEURONTIN) 100 mg capsule; 100 mg in the morning 100 mg in the afternoon and 200 mg at bedtime

## 2024-12-11 NOTE — ASSESSMENT & PLAN NOTE
Pain in both knees examination today confirms likelihood of arthritic cause of her pain.  Updated x-rays have been requested she can continue to utilize lidocaine Transderm patches on her knees.  Will evaluate benefit of gabapentin on return visit in 2 weeks.  May be a candidate for steroid injections or viscous therapy for the knees depending upon x-ray results    Orders:    XR knee 3 vw left non injury; Future    XR knee 3 vw right non injury; Future

## 2024-12-16 DIAGNOSIS — F41.9 ANXIETY: ICD-10-CM

## 2024-12-16 NOTE — TELEPHONE ENCOUNTER
This is NOT a duplicate. Medication needs to be sent to a different pharmacy    Reason for call:   [x] Refill   [] Prior Auth  [] Other:     Office:   [x] PCP/Provider -   [] Specialty/Provider -     Medication:   Alprazolam (xanax) 0.25mg- take 1 tablet orally in the morning, take 1 tablets orally at bedtime      Pharmacy: [facility]    Does the patient have enough for 3 days?   [x] Yes   [] No - Send as HP to POD

## 2024-12-17 RX ORDER — ALPRAZOLAM 0.25 MG/1
TABLET ORAL
Qty: 90 TABLET | Refills: 0 | Status: SHIPPED | OUTPATIENT
Start: 2024-12-17

## 2024-12-30 ENCOUNTER — OFFICE VISIT (OUTPATIENT)
Age: 89
End: 2024-12-30
Payer: MEDICARE

## 2024-12-30 VITALS
BODY MASS INDEX: 31.41 KG/M2 | SYSTOLIC BLOOD PRESSURE: 100 MMHG | OXYGEN SATURATION: 96 % | DIASTOLIC BLOOD PRESSURE: 54 MMHG | TEMPERATURE: 96.7 F | HEART RATE: 90 BPM | HEIGHT: 64 IN

## 2024-12-30 DIAGNOSIS — M24.811 INTERNAL DERANGEMENT OF SHOULDER, RIGHT: ICD-10-CM

## 2024-12-30 DIAGNOSIS — M17.0 PRIMARY OSTEOARTHRITIS OF BOTH KNEES: ICD-10-CM

## 2024-12-30 DIAGNOSIS — R42 LIGHTHEADED: ICD-10-CM

## 2024-12-30 DIAGNOSIS — I10 PRIMARY HYPERTENSION: Primary | ICD-10-CM

## 2024-12-30 DIAGNOSIS — I10 ESSENTIAL HYPERTENSION: ICD-10-CM

## 2024-12-30 PROBLEM — R53.81 PHYSICAL DECONDITIONING: Status: ACTIVE | Noted: 2024-12-30

## 2024-12-30 PROCEDURE — G2211 COMPLEX E/M VISIT ADD ON: HCPCS | Performed by: INTERNAL MEDICINE

## 2024-12-30 PROCEDURE — 99214 OFFICE O/P EST MOD 30 MIN: CPT | Performed by: INTERNAL MEDICINE

## 2024-12-30 RX ORDER — LISINOPRIL 30 MG/1
30 TABLET ORAL DAILY
Qty: 30 TABLET | Refills: 6 | Status: SHIPPED | OUTPATIENT
Start: 2024-12-30

## 2024-12-30 NOTE — ASSESSMENT & PLAN NOTE
Patient describes episode of lightheadedness and dizziness yesterday differential would include low blood pressure as we have noted today blood pressure is below desirable value range or possible side effect of gabapentin.  Given the low blood pressure today favor that it is most likely resulting from the pressure.  Will decrease lisinopril from 40 mg a day to 30 mg daily with follow-up in 7 to 10 days

## 2024-12-30 NOTE — ASSESSMENT & PLAN NOTE
Blood pressure assessment today actually reveals low blood pressure 100/54 likely a cause for yesterday's lightheaded symptoms.  I have indicated that we will decrease her dose of lisinopril from 40 mg a day down to 30 mg daily with a follow-up in 10 days.  Encouraged the patient to aggressively hydrate over the next couple days.

## 2024-12-30 NOTE — ASSESSMENT & PLAN NOTE
Continue gabapentin at 100 mg in the morning 100 in the afternoon and 200 mg at bedtime also continue local use of lidocaine patches

## 2024-12-30 NOTE — ASSESSMENT & PLAN NOTE
Patient still experiencing some baseline discomfort in the right shoulder continue with gabapentin 100 mg in the morning 100 in the afternoon 200 mg at bedtime follow-up in 10 days

## 2024-12-30 NOTE — PROGRESS NOTES
Name: Denisse Jolly      : 1935      MRN: [id number]  Encounter Provider: Dustin Baeza MD  Encounter Date: 2024   Encounter department: [facility] INTERNAL MEDICINE    Assessment & Plan  Essential hypertension    Orders:    lisinopril (ZESTRIL) 30 mg tablet; Take 1 tablet (30 mg total) by mouth daily    Primary hypertension  Blood pressure assessment today actually reveals low blood pressure 100/54 likely a cause for yesterday's lightheaded symptoms.  I have indicated that we will decrease her dose of lisinopril from 40 mg a day down to 30 mg daily with a follow-up in 10 days.  Encouraged the patient to aggressively hydrate over the next couple days.         Primary osteoarthritis of both knees  Continue gabapentin at 100 mg in the morning 100 in the afternoon and 200 mg at bedtime also continue local use of lidocaine patches         Internal derangement of shoulder, right  Patient still experiencing some baseline discomfort in the right shoulder continue with gabapentin 100 mg in the morning 100 in the afternoon 200 mg at bedtime follow-up in 10 days         Lightheaded  Patient describes episode of lightheadedness and dizziness yesterday differential would include low blood pressure as we have noted today blood pressure is below desirable value range or possible side effect of gabapentin.  Given the low blood pressure today favor that it is most likely resulting from the pressure.  Will decrease lisinopril from 40 mg a day to 30 mg daily with follow-up in 7 to 10 days              History of Present Illness     This 89-year-old female patient returns today for follow-up assessment.  Indicates that she felt quite lightheaded yesterday this lasted for some time.  Did not have any associated shortness of breath or chest discomfort.  She has been utilizing a wheelchair at the facility that she lives at and is self-propelling her self with her legs.  Indicates still having some  discomfort in her right knee as well as her shoulders.  Currently on gabapentin for management of the symptoms.  The lightheaded symptoms yesterday seem to improve with hydration.      Review of Systems   Musculoskeletal:         Pain in both knees and both shoulders   Neurological:  Positive for dizziness and light-headedness.     Past Medical History:   Diagnosis Date    Epistaxis     RESOLVED: 2/18/17    Saphenous vein thrombophlebitis, right 2012     Past Surgical History:   Procedure Laterality Date    GALLBLADDER SURGERY  2006     Family History   Problem Relation Age of Onset    Hypertension Mother      Social History     Tobacco Use    Smoking status: Never    Smokeless tobacco: Never   Vaping Use    Vaping status: Never Used   Substance and Sexual Activity    Alcohol use: Not Currently    Drug use: Never    Sexual activity: Not Currently     Current Outpatient Medications on File Prior to Visit   Medication Sig    Acetaminophen Extra Strength 500 MG TABS TAKE 2 CAPLETS (1000MG) ORALLY EVERY 8 HOURS (OSTEOARTHRITIS) *NOT TO EXCEED 4GMS APAP/24HR*    Allergy Relief 10 MG tablet GIVE 1 TABLET ORALLY DAILY (ALLERGIES)    ALPRAZolam (XANAX) 0.25 mg tablet TAKE 1 TABLET ORALLY IN THE MORNING (ANXIETY) (CONTROL) *REORDER*;TAKE 2 TABLETS (0.5MG) ORALLY AT BEDTIME (ANXIETY) (CONTROL) *REORDER*    aspirin 81 mg chewable tablet CHEW 1 TABLET AND SWALLOW ORALLY THREE TIMES WEEKLY MONDAY, WEDNESDAY & FRIDAY (CARDIAC DISEASE)    calcium carbonate (TUMS) 500 mg chewable tablet Chew 1 tablet daily    Chest Congestion Relief 100 MG/5ML oral liquid GIVE 10ML (200MG) ORALLY THREE TIMES DAILY AS NEEDED FOR COUGH *REORDER*    ciclopirox (LOPROX) 0.77 % cream     cyclopentolate (CYCLOGYL) 1 % ophthalmic solution INSTILL 1 DROP INTO RIGHT EYE THREE TIMES DAILY (DETACHED RENTINAL SURGERY) *REORDER*    docusate sodium (COLACE) 100 mg capsule Take 1 capsule (100 mg total) by mouth 2 (two) times a day    Emollient (Eucerin Advanced  Repair) CREA Apply topically to both upper extremities twice a day for dry skin    ergocalciferol (VITAMIN D2) 50,000 units GIVE 1 CAPSULE ORALLY EVERY WEEK (SUPPLEMENT) (DO NOT CRUSH) *REORDER*    estrogens, conjugated (Premarin) vaginal cream Apply 1/4 inch with finger to vaginal area at bedtime on Mondays, Wednesdays  and Fridays    famotidine (PEPCID) 20 mg tablet GIVE 1 TABLET ORALLY DAILY (GASTROESOPHAGEAL REFLUX DISEASE)    fluticasone (FLONASE) 50 mcg/act nasal spray INHALE 1 SPRAY INTO EACH NOSTRIL TWICE DAILY (ALLERGIES) *REORDER*    gabapentin (NEURONTIN) 100 mg capsule 100 mg in the morning 100 mg in the afternoon and 200 mg at bedtime    ketoconazole (NIZORAL) 2 % shampoo     Lidocaine Pain Relief 4 % PTCH APPLY 1/2 PATCH TOPICALLY TO EACH KNEE AS NEEDED FOR PAIN REMOVE AFTER 12 HOURS *REORDER*;APPLY 1 PATCH TOPICALLY TO RIGHT SHOULDER AS NEEDED FOR PAIN  REMOVE AFTER 12 HOURS *REORDER*    Lumigan 0.01 % ophthalmic drops     meloxicam (MOBIC) 7.5 mg tablet TAKE 1 TABLET ORALLY IN THE MORNING WITH MEAL    mometasone (ELOCON) 0.1 % cream APPLY TOPICALLY TO AFFECTED AREA AT BEDTIME (SKIN RASH) *REORDER*    Mouthwashes (Biotene Dry Mouth) LIQD USE TO RINSE MOUTH TWICE DAILY FOR DRY MOUTH *REORDER*    nystatin (MYCOSTATIN) powder Apply topically 2 (two) times a day    polyethylene glycol (GLYCOLAX) 17 GM/SCOOP powder Take 17 g by mouth daily as needed (for constipation)    Restasis 0.05 % ophthalmic emulsion INSTILL 1 DROP INTO EACH EYE TWICE DAILY FOR DRY EYE *BRAND PER INSURANCE* *REORDER*    Simbrinza 1-0.2 % SUSP     simethicone (MYLICON,GAS-X) 180 MG capsule Take 1 capsule (180 mg total) by mouth 4 (four) times a day Patient may have at bedside    sodium chloride (OCEAN) 0.65 % nasal spray 1 spray into each nostril 3 (three) times a day    [DISCONTINUED] amLODIPine (NORVASC) 5 mg tablet TAKE 1 TABLET ORALLY DAILY (HYPERTENSION) *REORDER*    [DISCONTINUED] lisinopril (ZESTRIL) 40 mg tablet GIVE 1 TABLET  "ORALLY DAILY (HYPERTENSION)     Allergies   Allergen Reactions    Acetazolamide     Ciprofloxacin Headache and Nausea Only    Nitrofurantoin     Pollen Extract     Sulfa Antibiotics     Keflex [Cephalexin] Itching     Immunization History   Administered Date(s) Administered    COVID-19 PFIZER VACCINE 0.3 ML IM 02/03/2021, 02/26/2021, 01/22/2022    COVID-19 Pfizer mRNA vacc PF francesca-sucrose 12 yr and older (Comirnaty) 01/22/2024    INFLUENZA 11/09/2022, 09/20/2023    Influenza Split High Dose Preservative Free IM 10/18/2016, 10/16/2017    Influenza, high dose seasonal 0.7 mL 10/31/2018, 09/30/2019, 10/13/2020, 10/21/2021, 11/09/2022, 09/20/2023    Pneumococcal Conjugate 13-Valent 02/17/2017     Objective   /54   Pulse 90   Temp (!) 96.7 °F (35.9 °C) (Tympanic)   Ht 5' 4\" (1.626 m)   SpO2 96%   BMI 31.41 kg/m²     Physical Exam    "

## 2025-01-09 ENCOUNTER — OFFICE VISIT (OUTPATIENT)
Age: 89
End: 2025-01-09
Payer: MEDICARE

## 2025-01-09 VITALS
OXYGEN SATURATION: 94 % | SYSTOLIC BLOOD PRESSURE: 126 MMHG | DIASTOLIC BLOOD PRESSURE: 68 MMHG | HEART RATE: 87 BPM | TEMPERATURE: 96.4 F | HEIGHT: 64 IN | BODY MASS INDEX: 31.41 KG/M2

## 2025-01-09 DIAGNOSIS — R53.81 PHYSICAL DECONDITIONING: ICD-10-CM

## 2025-01-09 DIAGNOSIS — M25.562 CHRONIC PAIN OF BOTH KNEES: Primary | ICD-10-CM

## 2025-01-09 DIAGNOSIS — I10 PRIMARY HYPERTENSION: ICD-10-CM

## 2025-01-09 DIAGNOSIS — G89.29 CHRONIC PAIN OF BOTH KNEES: Primary | ICD-10-CM

## 2025-01-09 DIAGNOSIS — M25.561 CHRONIC PAIN OF BOTH KNEES: Primary | ICD-10-CM

## 2025-01-09 PROCEDURE — 99214 OFFICE O/P EST MOD 30 MIN: CPT | Performed by: INTERNAL MEDICINE

## 2025-01-09 NOTE — ASSESSMENT & PLAN NOTE
Chronic pain in both legs.  Recommend orthopedic consultation perhaps a cortisone injection in the joints would be beneficial to help reduce her discomfort.    Orders:    Ambulatory Referral to Orthopedic Surgery; Future     No

## 2025-01-09 NOTE — ASSESSMENT & PLAN NOTE
Patient continuing with physical therapy several days a week to work on physical conditioning and strengthening.  Ambulates at all times with walker or on days when she has particularly severe arthritic pain in her knees she resorts to a wheelchair

## 2025-01-09 NOTE — PROGRESS NOTES
Name: Denisse Jolly      : 1935      MRN: [id number]  Encounter Provider: Dustin Baeza MD  Encounter Date: 2025   Encounter department: [facility] INTERNAL MEDICINE    Assessment & Plan  Chronic pain of both knees  Chronic pain in both legs.  Recommend orthopedic consultation perhaps a cortisone injection in the joints would be beneficial to help reduce her discomfort.    Orders:    Ambulatory Referral to Orthopedic Surgery; Future    Primary hypertension  Improved blood pressure on reduced dose of lisinopril continue 30 mg lisinopril daily         Physical deconditioning  Patient continuing with physical therapy several days a week to work on physical conditioning and strengthening.  Ambulates at all times with walker or on days when she has particularly severe arthritic pain in her knees she resorts to a wheelchair              History of Present Illness     This 89-year-old female returns today for follow-up assessment of her blood pressure.  We found blood pressure to be on the low side with symptoms of lightheadedness at the time of her last visit with us.  Her lisinopril medication was reduced from 40 mg a day to 30 mg.  She reports decrease in lightheadedness symptoms since our medication adjustment.    Patient today relates that she has been experiencing a significant increase in her bilateral knee arthritis pain.  She has been utilizing lidocaine patches on her knees to help reduce her discomfort but finds that these patches are no longer very effective.  She finds it difficult to ambulate.  Has been using a walker over the past several months but now occasionally has to resort to using a wheelchair.  Billet he is essential for her to remain in her independent living situation which she would like to maintain.      Review of Systems   Musculoskeletal:  Positive for arthralgias.        Bilateral knee pain     Past Medical History:   Diagnosis Date    Epistaxis      RESOLVED: 2/18/17    Saphenous vein thrombophlebitis, right 2012     Past Surgical History:   Procedure Laterality Date    GALLBLADDER SURGERY  2006     Family History   Problem Relation Age of Onset    Hypertension Mother      Social History     Tobacco Use    Smoking status: Never    Smokeless tobacco: Never   Vaping Use    Vaping status: Never Used   Substance and Sexual Activity    Alcohol use: Not Currently    Drug use: Never    Sexual activity: Not Currently     Current Outpatient Medications on File Prior to Visit   Medication Sig    Acetaminophen Extra Strength 500 MG TABS TAKE 2 CAPLETS (1000MG) ORALLY EVERY 8 HOURS (OSTEOARTHRITIS) *NOT TO EXCEED 4GMS APAP/24HR*    Allergy Relief 10 MG tablet GIVE 1 TABLET ORALLY DAILY (ALLERGIES)    ALPRAZolam (XANAX) 0.25 mg tablet TAKE 1 TABLET ORALLY IN THE MORNING (ANXIETY) (CONTROL) *REORDER*;TAKE 2 TABLETS (0.5MG) ORALLY AT BEDTIME (ANXIETY) (CONTROL) *REORDER*    aspirin 81 mg chewable tablet CHEW 1 TABLET AND SWALLOW ORALLY THREE TIMES WEEKLY MONDAY, WEDNESDAY & FRIDAY (CARDIAC DISEASE)    calcium carbonate (TUMS) 500 mg chewable tablet Chew 1 tablet daily    Chest Congestion Relief 100 MG/5ML oral liquid GIVE 10ML (200MG) ORALLY THREE TIMES DAILY AS NEEDED FOR COUGH *REORDER*    ciclopirox (LOPROX) 0.77 % cream     cyclopentolate (CYCLOGYL) 1 % ophthalmic solution INSTILL 1 DROP INTO RIGHT EYE THREE TIMES DAILY (DETACHED RENTINAL SURGERY) *REORDER*    docusate sodium (COLACE) 100 mg capsule Take 1 capsule (100 mg total) by mouth 2 (two) times a day    Emollient (Eucerin Advanced Repair) CREA Apply topically to both upper extremities twice a day for dry skin    ergocalciferol (VITAMIN D2) 50,000 units GIVE 1 CAPSULE ORALLY EVERY WEEK (SUPPLEMENT) (DO NOT CRUSH) *REORDER*    estrogens, conjugated (Premarin) vaginal cream Apply 1/4 inch with finger to vaginal area at bedtime on Mondays, Wednesdays  and Fridays    famotidine (PEPCID) 20 mg tablet GIVE 1 TABLET ORALLY  DAILY (GASTROESOPHAGEAL REFLUX DISEASE)    fluticasone (FLONASE) 50 mcg/act nasal spray INHALE 1 SPRAY INTO EACH NOSTRIL TWICE DAILY (ALLERGIES) *REORDER*    gabapentin (NEURONTIN) 100 mg capsule 100 mg in the morning 100 mg in the afternoon and 200 mg at bedtime    ketoconazole (NIZORAL) 2 % shampoo     Lidocaine Pain Relief 4 % PTCH APPLY 1/2 PATCH TOPICALLY TO EACH KNEE AS NEEDED FOR PAIN REMOVE AFTER 12 HOURS *REORDER*;APPLY 1 PATCH TOPICALLY TO RIGHT SHOULDER AS NEEDED FOR PAIN  REMOVE AFTER 12 HOURS *REORDER*    lisinopril (ZESTRIL) 30 mg tablet Take 1 tablet (30 mg total) by mouth daily    Lumigan 0.01 % ophthalmic drops     meloxicam (MOBIC) 7.5 mg tablet TAKE 1 TABLET ORALLY IN THE MORNING WITH MEAL    mometasone (ELOCON) 0.1 % cream APPLY TOPICALLY TO AFFECTED AREA AT BEDTIME (SKIN RASH) *REORDER*    Mouthwashes (Biotene Dry Mouth) LIQD USE TO RINSE MOUTH TWICE DAILY FOR DRY MOUTH *REORDER*    nystatin (MYCOSTATIN) powder Apply topically 2 (two) times a day    polyethylene glycol (GLYCOLAX) 17 GM/SCOOP powder Take 17 g by mouth daily as needed (for constipation)    Restasis 0.05 % ophthalmic emulsion INSTILL 1 DROP INTO EACH EYE TWICE DAILY FOR DRY EYE *BRAND PER INSURANCE* *REORDER*    Simbrinza 1-0.2 % SUSP     simethicone (MYLICON,GAS-X) 180 MG capsule Take 1 capsule (180 mg total) by mouth 4 (four) times a day Patient may have at bedside    sodium chloride (OCEAN) 0.65 % nasal spray 1 spray into each nostril 3 (three) times a day     Allergies   Allergen Reactions    Acetazolamide     Ciprofloxacin Headache and Nausea Only    Nitrofurantoin     Pollen Extract     Sulfa Antibiotics     Keflex [Cephalexin] Itching     Immunization History   Administered Date(s) Administered    COVID-19 PFIZER VACCINE 0.3 ML IM 02/03/2021, 02/26/2021, 01/22/2022    COVID-19 Pfizer mRNA vacc PF francesca-sucrose 12 yr and older (Comirnaty) 01/22/2024    INFLUENZA 11/09/2022, 09/20/2023    Influenza Split High Dose Preservative  "Free IM 10/18/2016, 10/16/2017    Influenza, high dose seasonal 0.7 mL 10/31/2018, 09/30/2019, 10/13/2020, 10/21/2021, 11/09/2022, 09/20/2023    Pneumococcal Conjugate 13-Valent 02/17/2017     Objective   /68   Pulse 87   Temp (!) 96.4 °F (35.8 °C) (Tympanic)   Ht 5' 4\" (1.626 m)   SpO2 94%   BMI 31.41 kg/m²     Physical Exam  Vitals and nursing note reviewed.   Constitutional:       General: She is not in acute distress.     Appearance: She is well-developed.   HENT:      Head: Normocephalic and atraumatic.   Eyes:      Conjunctiva/sclera: Conjunctivae normal.   Cardiovascular:      Rate and Rhythm: Normal rate and regular rhythm.      Heart sounds: No murmur heard.  Pulmonary:      Effort: Pulmonary effort is normal. No respiratory distress.      Breath sounds: Normal breath sounds.   Abdominal:      Palpations: Abdomen is soft.      Tenderness: There is no abdominal tenderness.   Musculoskeletal:         General: Swelling, tenderness and deformity present.      Cervical back: Neck supple.      Comments: Swelling warmth effusion and tenderness present in both knees   Skin:     General: Skin is warm and dry.      Capillary Refill: Capillary refill takes less than 2 seconds.   Neurological:      Mental Status: She is alert.   Psychiatric:         Mood and Affect: Mood normal.         "

## 2025-01-13 DIAGNOSIS — F41.9 ANXIETY: ICD-10-CM

## 2025-01-13 RX ORDER — ALPRAZOLAM 0.25 MG/1
TABLET ORAL
Qty: 90 TABLET | Refills: 0 | Status: SHIPPED | OUTPATIENT
Start: 2025-01-13

## 2025-01-22 ENCOUNTER — TELEPHONE (OUTPATIENT)
Age: 89
End: 2025-01-22

## 2025-01-22 NOTE — TELEPHONE ENCOUNTER
Umu from [facility] called to cancel an appointment with dr baeza for 1/24/25 due to the patient not seeing her orthopedic doctor. She had an appointment with ortho and declined to go. Once Umu reschedules the ortho appointment, she will call us back to reschedule the appointment with Dr Baeza for the follow up. Please advise if needed.

## 2025-02-03 ENCOUNTER — APPOINTMENT (OUTPATIENT)
Dept: RADIOLOGY | Facility: AMBULARY SURGERY CENTER | Age: 89
End: 2025-02-03
Attending: ORTHOPAEDIC SURGERY
Payer: MEDICARE

## 2025-02-03 ENCOUNTER — OFFICE VISIT (OUTPATIENT)
Dept: OBGYN CLINIC | Facility: CLINIC | Age: 89
End: 2025-02-03
Payer: MEDICARE

## 2025-02-03 DIAGNOSIS — M25.562 LEFT KNEE PAIN, UNSPECIFIED CHRONICITY: Primary | ICD-10-CM

## 2025-02-03 DIAGNOSIS — M25.561 RIGHT KNEE PAIN, UNSPECIFIED CHRONICITY: ICD-10-CM

## 2025-02-03 DIAGNOSIS — M17.0 PRIMARY OSTEOARTHRITIS OF BOTH KNEES: ICD-10-CM

## 2025-02-03 DIAGNOSIS — M25.562 LEFT KNEE PAIN, UNSPECIFIED CHRONICITY: ICD-10-CM

## 2025-02-03 PROCEDURE — 99214 OFFICE O/P EST MOD 30 MIN: CPT | Performed by: ORTHOPAEDIC SURGERY

## 2025-02-03 PROCEDURE — 20610 DRAIN/INJ JOINT/BURSA W/O US: CPT | Performed by: ORTHOPAEDIC SURGERY

## 2025-02-03 PROCEDURE — 73562 X-RAY EXAM OF KNEE 3: CPT

## 2025-02-03 RX ORDER — TRIAMCINOLONE ACETONIDE 40 MG/ML
80 INJECTION, SUSPENSION INTRA-ARTICULAR; INTRAMUSCULAR
Status: COMPLETED | OUTPATIENT
Start: 2025-02-03 | End: 2025-02-03

## 2025-02-03 RX ORDER — BUPIVACAINE HYDROCHLORIDE 2.5 MG/ML
2 INJECTION, SOLUTION EPIDURAL; INFILTRATION; INTRACAUDAL
Status: COMPLETED | OUTPATIENT
Start: 2025-02-03 | End: 2025-02-03

## 2025-02-03 RX ADMIN — TRIAMCINOLONE ACETONIDE 80 MG: 40 INJECTION, SUSPENSION INTRA-ARTICULAR; INTRAMUSCULAR at 15:00

## 2025-02-03 RX ADMIN — BUPIVACAINE HYDROCHLORIDE 2 ML: 2.5 INJECTION, SOLUTION EPIDURAL; INFILTRATION; INTRACAUDAL at 15:00

## 2025-02-03 NOTE — PROGRESS NOTES
Assessment:  1. Left knee pain, unspecified chronicity  XR knee 3 vw left non injury      2. Right knee pain, unspecified chronicity  XR knee 3 vw right non injury        Patient Active Problem List   Diagnosis    Anxiety    Fibromyalgia    Hypertension    Hyperlipidemia    Glucose intolerance    Flatulence    Depression    Bunion of great toe    Hammertoe of right foot    Allergy    Varicose veins of both lower extremities    Nail fungus    Other fatigue    Arthritis    Internal derangement of shoulder, right    Rotator cuff tear arthropathy of right shoulder    Adjustment insomnia    Primary osteoarthritis of both knees    Palpitations    Nonrheumatic aortic valve insufficiency    Nonrheumatic tricuspid valve regurgitation    Nonrheumatic pulmonary valve insufficiency    Yeast infection of the skin    Vaginal yeast infection    Hypertriglyceridemia    Rash    Right shoulder pain    Vaginal atrophy    Chronic pain of both knees    Physical deconditioning    Lightheaded           Plan      Cortisone injections given into both knees we did talk about the lubricant injections bracing and total knee arthroplasty.  She will let us know if her pain is coming back and if she wants another cortisone injection or lubricant injection I did explain the benefits the pros and cons of the lubricant injection as well.  She will make an appointment for shoulder in the future as well.  She would like to have her shoulder checked out.            Subjective:     Patient ID:    Chief Complaint:Denisse Jolly 89 y.o. female      HPI    Patient comes in today with regards to bilateral knee pain.  The patient reports that the pain is in the medial lateral and anterior aspect both knees and has been going on for couple years.  The pain is rated at3 at its best and9 at its worst.  The pain is described as achy.  It is worsened with walking, and is made better with uncertain.  The patient has taken Tylenol for treatment.      The  following portions of the patient's history were reviewed and updated as appropriate: allergies, current medications, past family history, past social history, past surgical history and problem list.        Social History     Socioeconomic History    Marital status: /Civil Union     Spouse name: Not on file    Number of children: Not on file    Years of education: Not on file    Highest education level: Not on file   Occupational History    Not on file   Tobacco Use    Smoking status: Never    Smokeless tobacco: Never   Vaping Use    Vaping status: Never Used   Substance and Sexual Activity    Alcohol use: Not Currently    Drug use: Never    Sexual activity: Not Currently   Other Topics Concern    Not on file   Social History Narrative    Not on file     Social Drivers of Health     Financial Resource Strain: Low Risk  (9/20/2023)    Overall Financial Resource Strain (CARDIA)     Difficulty of Paying Living Expenses: Not very hard   Food Insecurity: No Food Insecurity (11/8/2024)    Hunger Vital Sign     Worried About Running Out of Food in the Last Year: Never true     Ran Out of Food in the Last Year: Never true   Transportation Needs: No Transportation Needs (11/8/2024)    PRAPARE - Transportation     Lack of Transportation (Medical): No     Lack of Transportation (Non-Medical): No   Physical Activity: Not on file   Stress: Not on file   Social Connections: Not on file   Intimate Partner Violence: Not on file   Housing Stability: Low Risk  (11/8/2024)    Housing Stability Vital Sign     Unable to Pay for Housing in the Last Year: No     Number of Times Moved in the Last Year: 0     Homeless in the Last Year: No     Past Medical History:   Diagnosis Date    Epistaxis     RESOLVED: 2/18/17    Saphenous vein thrombophlebitis, right 2012     Past Surgical History:   Procedure Laterality Date    GALLBLADDER SURGERY  2006     Allergies   Allergen Reactions    Acetazolamide     Ciprofloxacin Headache and Nausea  Only    Nitrofurantoin     Pollen Extract     Sulfa Antibiotics     Keflex [Cephalexin] Itching     Current Outpatient Medications on File Prior to Visit   Medication Sig Dispense Refill    Acetaminophen Extra Strength 500 MG TABS TAKE 2 CAPLETS (1000MG) ORALLY EVERY 8 HOURS (OSTEOARTHRITIS) *NOT TO EXCEED 4GMS APAP/24HR* 180 tablet 2    Allergy Relief 10 MG tablet GIVE 1 TABLET ORALLY DAILY (ALLERGIES) 30 tablet 11    ALPRAZolam (XANAX) 0.25 mg tablet TAKE 1 TABLET ORALLY IN THE MORNING (ANXIETY) (CONTROL) *REORDER*;TAKE 2 TABLETS (0.5MG) ORALLY AT BEDTIME (ANXIETY) (CONTROL) *REORDER* 90 tablet 0    aspirin 81 mg chewable tablet CHEW 1 TABLET AND SWALLOW ORALLY THREE TIMES WEEKLY MONDAY, WEDNESDAY & FRIDAY (CARDIAC DISEASE) 13 tablet 11    calcium carbonate (TUMS) 500 mg chewable tablet Chew 1 tablet daily      Chest Congestion Relief 100 MG/5ML oral liquid GIVE 10ML (200MG) ORALLY THREE TIMES DAILY AS NEEDED FOR COUGH *REORDER* 473 mL 5    ciclopirox (LOPROX) 0.77 % cream       cyclopentolate (CYCLOGYL) 1 % ophthalmic solution INSTILL 1 DROP INTO RIGHT EYE THREE TIMES DAILY (DETACHED RENTINAL SURGERY) *REORDER* 2 mL 5    docusate sodium (COLACE) 100 mg capsule Take 1 capsule (100 mg total) by mouth 2 (two) times a day 60 capsule 5    Emollient (Eucerin Advanced Repair) CREA Apply topically to both upper extremities twice a day for dry skin 454 g 4    ergocalciferol (VITAMIN D2) 50,000 units GIVE 1 CAPSULE ORALLY EVERY WEEK (SUPPLEMENT) (DO NOT CRUSH) *REORDER* 4 capsule 11    estrogens, conjugated (Premarin) vaginal cream Apply 1/4 inch with finger to vaginal area at bedtime on Mondays, Wednesdays  and Fridays 30 g 5    famotidine (PEPCID) 20 mg tablet GIVE 1 TABLET ORALLY DAILY (GASTROESOPHAGEAL REFLUX DISEASE) 30 tablet 11    fluticasone (FLONASE) 50 mcg/act nasal spray INHALE 1 SPRAY INTO EACH NOSTRIL TWICE DAILY (ALLERGIES) *REORDER* 16 g 11    gabapentin (NEURONTIN) 100 mg capsule 100 mg in the morning 100  mg in the afternoon and 200 mg at bedtime 120 capsule 5    ketoconazole (NIZORAL) 2 % shampoo       Lidocaine Pain Relief 4 % PTCH APPLY 1/2 PATCH TOPICALLY TO EACH KNEE AS NEEDED FOR PAIN REMOVE AFTER 12 HOURS *REORDER*;APPLY 1 PATCH TOPICALLY TO RIGHT SHOULDER AS NEEDED FOR PAIN  REMOVE AFTER 12 HOURS *REORDER* 60 patch 11    lisinopril (ZESTRIL) 30 mg tablet Take 1 tablet (30 mg total) by mouth daily 30 tablet 6    Lumigan 0.01 % ophthalmic drops       meloxicam (MOBIC) 7.5 mg tablet TAKE 1 TABLET ORALLY IN THE MORNING WITH MEAL 30 tablet 11    mometasone (ELOCON) 0.1 % cream APPLY TOPICALLY TO AFFECTED AREA AT BEDTIME (SKIN RASH) *REORDER* 45 g 11    Mouthwashes (Biotene Dry Mouth) LIQD USE TO RINSE MOUTH TWICE DAILY FOR DRY MOUTH *REORDER* 473 mL 11    nystatin (MYCOSTATIN) powder Apply topically 2 (two) times a day 30 g 11    polyethylene glycol (GLYCOLAX) 17 GM/SCOOP powder Take 17 g by mouth daily as needed (for constipation) 116 g 2    Restasis 0.05 % ophthalmic emulsion INSTILL 1 DROP INTO EACH EYE TWICE DAILY FOR DRY EYE *BRAND PER INSURANCE* *REORDER* 5.5 mL 5    Simbrinza 1-0.2 % SUSP       simethicone (MYLICON,GAS-X) 180 MG capsule Take 1 capsule (180 mg total) by mouth 4 (four) times a day Patient may have at bedside 120 capsule 6    sodium chloride (OCEAN) 0.65 % nasal spray 1 spray into each nostril 3 (three) times a day 15 mL 3     No current facility-administered medications on file prior to visit.              Objective:    Review of Systems   Constitutional: Negative.    HENT: Negative.     Eyes: Negative.    Respiratory: Negative.     Cardiovascular: Negative.    Gastrointestinal: Negative.    Endocrine: Negative.    Genitourinary: Negative.    Skin: Negative.    Allergic/Immunologic: Negative.    Neurological: Negative.    Hematological: Negative.    Psychiatric/Behavioral: Negative.         Right Knee Exam     Muscle Strength   The patient has normal right knee strength.    Tenderness   The  "patient is experiencing tenderness in the lateral joint line and medial joint line.    Range of Motion   Extension:  normal   Flexion:  90     Tests   Varus: positive Valgus: positive    Other   Swelling: none  Effusion: no effusion present      Left Knee Exam     Muscle Strength   The patient has normal left knee strength.    Tenderness   The patient is experiencing tenderness in the lateral joint line and medial joint line.    Range of Motion   Extension:  normal   Flexion:  90     Tests   Varus: positive     Other   Swelling: none  Effusion: no effusion present            Physical Exam  Vitals and nursing note reviewed.   Constitutional:       Appearance: She is well-developed.   HENT:      Head: Normocephalic.   Eyes:      Pupils: Pupils are equal, round, and reactive to light.   Cardiovascular:      Rate and Rhythm: Normal rate.   Pulmonary:      Effort: Pulmonary effort is normal.   Abdominal:      General: There is no distension.   Musculoskeletal:      Cervical back: Normal range of motion.      Right knee: No effusion.      Left knee: No effusion.   Skin:     General: Skin is warm.   Neurological:      Mental Status: She is alert and oriented to person, place, and time.         Large joint arthrocentesis: R knee  Universal Protocol:  Consent given by: patient  Supporting Documentation  Indications: pain   Procedure Details  Location: knee - R knee  Needle size: 22 G  Ultrasound guidance: no  Approach: anterolateral  Medications administered: 2 mL bupivacaine (PF) 0.25 %; 80 mg triamcinolone acetonide 40 mg/mL        Large joint arthrocentesis: L knee  Universal Protocol:  Consent given by: patient  Time out: Immediately prior to procedure a \"time out\" was called to verify the correct patient, procedure, equipment, support staff and site/side marked as required.  Supporting Documentation  Indications: pain   Procedure Details  Location: knee - L knee  Needle size: 22 G  Ultrasound guidance: no  Approach: " "anterolateral  Medications administered: 2 mL bupivacaine (PF) 0.25 %; 80 mg triamcinolone acetonide 40 mg/mL               I have personally reviewed pertinent films in PACS and my interpretation is x-rays show severe arthritis affecting the left knee valgus deformity as well as moderate arthritis of the right knee as well..      Portions of the record may have been created with voice recognition software.  Occasional wrong word or \"sound a like\" substitutions may have occurred due to the inherent limitations of voice recognition software.  Read the chart carefully and recognize, using context, where substitutions have occurred.  "

## 2025-02-06 ENCOUNTER — TELEPHONE (OUTPATIENT)
Age: 89
End: 2025-02-06

## 2025-02-06 DIAGNOSIS — R41.0 CONFUSION: ICD-10-CM

## 2025-02-06 DIAGNOSIS — R35.0 FREQUENT URINATION: Primary | ICD-10-CM

## 2025-02-06 NOTE — TELEPHONE ENCOUNTER
Nurse from [facility] reports pt is more confused than normal. Pt was thinking her -parents are still alive and trying to call them. Pt was aslo asking for  while he is also passed. They report urinating more than usual , they are asking for advise on how to proceed. If  would like to send  a script for Urine culture please fax to [phone]

## 2025-02-06 NOTE — TELEPHONE ENCOUNTER
Kerri called to ask Dr. Baeza to please reach out to [facility] and have them please take patient to the \[facility]"" ER.       Please advise.     Please reach out to daughter once completed.

## 2025-02-06 NOTE — TELEPHONE ENCOUNTER
Patient is being transport to the [facility] ER for Evaluate. Please inform doctor Aryan about the patient. Thank you

## 2025-02-11 ENCOUNTER — TELEPHONE (OUTPATIENT)
Age: 89
End: 2025-02-11

## 2025-02-11 DIAGNOSIS — R19.7 DIARRHEA, UNSPECIFIED TYPE: Primary | ICD-10-CM

## 2025-02-11 RX ORDER — LOPERAMIDE HYDROCHLORIDE 2 MG/1
2 TABLET ORAL AS NEEDED
Qty: 30 TABLET | Refills: 0 | Status: SHIPPED | OUTPATIENT
Start: 2025-02-11

## 2025-02-11 NOTE — TELEPHONE ENCOUNTER
I have sent a prescription for Imodium to the pharmacy she can take 1 tablet after each liquid bowel movement up to 6 pills in 24 hours.  Please have them put her on a liquid diet for 24 hours until the diarrhea resolves.  Thank you

## 2025-02-11 NOTE — TELEPHONE ENCOUNTER
Kobi from [facility] called stating that patient has had right bouts of diarrhea in past day or so.  He would like to know if anything can be prescribed/recommended.  [facility] is preferred pharmacy.  Any questions call Kobi at [phone].Please advise.

## 2025-02-15 ENCOUNTER — TELEPHONE (OUTPATIENT)
Dept: OTHER | Facility: OTHER | Age: 89
End: 2025-02-15

## 2025-02-15 DIAGNOSIS — R68.2 DRY MOUTH: Primary | ICD-10-CM

## 2025-02-15 RX ORDER — PILOCARPINE HYDROCHLORIDE 5 MG/1
5 TABLET, FILM COATED ORAL 3 TIMES DAILY
Qty: 90 TABLET | Refills: 3 | Status: SHIPPED | OUTPATIENT
Start: 2025-02-15 | End: 2025-02-19

## 2025-02-15 NOTE — PROGRESS NOTES
Nurse called the patient is complaining of dry mouth is already on biotene.  Will start pilocarpine 5mg tid

## 2025-02-18 DIAGNOSIS — F41.9 ANXIETY: ICD-10-CM

## 2025-02-19 ENCOUNTER — OFFICE VISIT (OUTPATIENT)
Age: 89
End: 2025-02-19
Payer: MEDICARE

## 2025-02-19 VITALS
HEART RATE: 94 BPM | DIASTOLIC BLOOD PRESSURE: 66 MMHG | OXYGEN SATURATION: 96 % | BODY MASS INDEX: 31.41 KG/M2 | SYSTOLIC BLOOD PRESSURE: 118 MMHG | HEIGHT: 64 IN | TEMPERATURE: 96.5 F

## 2025-02-19 DIAGNOSIS — I10 PRIMARY HYPERTENSION: ICD-10-CM

## 2025-02-19 DIAGNOSIS — R41.0 CONFUSION: ICD-10-CM

## 2025-02-19 DIAGNOSIS — M19.90 ARTHRITIS: Primary | ICD-10-CM

## 2025-02-19 DIAGNOSIS — R41.3 MEMORY DEFICIT: ICD-10-CM

## 2025-02-19 DIAGNOSIS — F41.9 ANXIETY: ICD-10-CM

## 2025-02-19 PROCEDURE — 99214 OFFICE O/P EST MOD 30 MIN: CPT | Performed by: INTERNAL MEDICINE

## 2025-02-19 PROCEDURE — G2211 COMPLEX E/M VISIT ADD ON: HCPCS | Performed by: INTERNAL MEDICINE

## 2025-02-19 RX ORDER — ALPRAZOLAM 0.25 MG/1
TABLET ORAL
Qty: 90 TABLET | Refills: 0 | Status: SHIPPED | OUTPATIENT
Start: 2025-02-19

## 2025-02-19 NOTE — ASSESSMENT & PLAN NOTE
Ongoing anxiety issues continue alprazolam for now given early signs of confusion and forgetfulness may have to dial back dosing of medication if further evidence of confusion or forgetfulness

## 2025-02-19 NOTE — ASSESSMENT & PLAN NOTE
Current blood pressure assessment indicates adequate control recommend continuation of lisinopril 30 mg daily

## 2025-02-19 NOTE — ASSESSMENT & PLAN NOTE
Arthritis of both knees as well as both shoulders patient has difficulty ambulating.  Did receive cortisone injection to both knees about 2-1/2 weeks ago not clear if that provided her with any significant improvement.  Ambulating with a walker at all times.  Believe that her age with early signs of confusion that surgical intervention for her shoulders or knees would be advisable

## 2025-02-19 NOTE — PROGRESS NOTES
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  Did receive cortisone injection to both knees about 2-1/2 weeks ago not clear if that provided her with any significant improvement.  Ambulating with a walker at all times.  Believe that her age with early signs of confusion that surgical intervention for her shoulders or knees would be advisable         Anxiety  Ongoing anxiety issues continue alprazolam for now given early signs of confusion and forgetfulness may have to dial back dosing of medication if further evidence of confusion or forgetfulness         Memory deficit  Definite signs of memory deficit on today's visit does not recall visit with orthopedics 2-1/2 weeks ago.  Other issues she is becoming more forgetful about as well will continue to monitor may have to dial back alprazolam if further evidence of forgetfulness develops         Confusion  Episode of significant confusion requiring ER evaluation testing revealed only evidence of UTI treated with antibiotics and confusion state has improved we will continue to closely monitor              History of Present Illness     This 89-year-old female patient returns to our office today having been seen in the emergency room recently for an episode of increased confusion.  She was under treatment for a UTI and started to display increased confusion thinking that her parents were still alive and that her  who has passed away previously was also alive.  This was new behavior for the patient.  She went testing in the emergency room and no other issues  were identified other than urinary tract infection and was placed on antibiotic therapy today the symptoms of confusion have resolved.    We reviewed with the patient and her current symptoms of dry mouth several days ago her nurse notified us that she was experiencing significant dry mouth.  She had been using Biotene mouthwash twice a day but mouth was still excessively dry.  I provided the patient with a prescription for pilocarpine 5 mg 3 times a day she indicates it did not particularly help her dry mouth but it did cause increase in sedation and drowsiness.  She is not currently interested in continuing the medication.    The patient continues to experience arthritic issues in both knees as well as her shoulder on the right side and the left side.  She continues to have difficulty with ambulation because of the bilateral knee arthritis.  Recent visit with [facility] orthopedics was reviewed.  She was provided with bilateral steroid injections in her knees.  The patient does not recall the visit to her orthopedic surgeon 2-1/2 weeks ago.      Review of Systems   HENT:          Dry mouth symptoms   Musculoskeletal:  Positive for arthralgias.        Arthritic pain in both knees as well as both shoulders   Neurological:         Noticeable increase in confusion forgetfulness patient forgot that she was seen by orthopedics 2-1/2 weeks ago also had an episode of more significant confusion requiring evaluation in the emergency room.  Found to have UTI.   All other systems reviewed and are negative.    Past Medical History:   Diagnosis Date    Epistaxis     RESOLVED: 2/18/17    Saphenous vein thrombophlebitis, right 2012     Past Surgical History:   Procedure Laterality Date    GALLBLADDER SURGERY  2006     Family History   Problem Relation Age of Onset    Hypertension Mother      Social History     Tobacco Use    Smoking status: Never    Smokeless tobacco: Never   Vaping Use    Vaping status: Never Used   Substance  and Sexual Activity    Alcohol use: Not Currently    Drug use: Never    Sexual activity: Not Currently     Current Outpatient Medications on File Prior to Visit   Medication Sig    Acetaminophen Extra Strength 500 MG TABS TAKE 2 CAPLETS (1000MG) ORALLY EVERY 8 HOURS (OSTEOARTHRITIS) *NOT TO EXCEED 4GMS APAP/24HR*    Allergy Relief 10 MG tablet GIVE 1 TABLET ORALLY DAILY (ALLERGIES)    ALPRAZolam (XANAX) 0.25 mg tablet TAKE 1 TABLET ORALLY IN THE MORNING (ANXIETY) (CONTROL) *REORDER*;TAKE 2 TABLETS (0.5MG) ORALLY AT BEDTIME (ANXIETY) (CONTROL) *REORDER*    aspirin 81 mg chewable tablet CHEW 1 TABLET AND SWALLOW ORALLY THREE TIMES WEEKLY MONDAY, WEDNESDAY & FRIDAY (CARDIAC DISEASE)    calcium carbonate (TUMS) 500 mg chewable tablet Chew 1 tablet daily    Chest Congestion Relief 100 MG/5ML oral liquid GIVE 10ML (200MG) ORALLY THREE TIMES DAILY AS NEEDED FOR COUGH *REORDER*    ciclopirox (LOPROX) 0.77 % cream     cyclopentolate (CYCLOGYL) 1 % ophthalmic solution INSTILL 1 DROP INTO RIGHT EYE THREE TIMES DAILY (DETACHED RENTINAL SURGERY) *REORDER*    docusate sodium (COLACE) 100 mg capsule Take 1 capsule (100 mg total) by mouth 2 (two) times a day    Emollient (Eucerin Advanced Repair) CREA Apply topically to both upper extremities twice a day for dry skin    ergocalciferol (VITAMIN D2) 50,000 units GIVE 1 CAPSULE ORALLY EVERY WEEK (SUPPLEMENT) (DO NOT CRUSH) *REORDER*    estrogens, conjugated (Premarin) vaginal cream Apply 1/4 inch with finger to vaginal area at bedtime on Mondays, Wednesdays  and Fridays    famotidine (PEPCID) 20 mg tablet GIVE 1 TABLET ORALLY DAILY (GASTROESOPHAGEAL REFLUX DISEASE)    fluticasone (FLONASE) 50 mcg/act nasal spray INHALE 1 SPRAY INTO EACH NOSTRIL TWICE DAILY (ALLERGIES) *REORDER*    gabapentin (NEURONTIN) 100 mg capsule 100 mg in the morning 100 mg in the afternoon and 200 mg at bedtime    ketoconazole (NIZORAL) 2 % shampoo     Lidocaine Pain Relief 4 % PTCH APPLY 1/2 PATCH TOPICALLY TO  EACH KNEE AS NEEDED FOR PAIN REMOVE AFTER 12 HOURS *REORDER*;APPLY 1 PATCH TOPICALLY TO RIGHT SHOULDER AS NEEDED FOR PAIN  REMOVE AFTER 12 HOURS *REORDER*    lisinopril (ZESTRIL) 30 mg tablet Take 1 tablet (30 mg total) by mouth daily    loperamide (IMODIUM A-D) 2 MG tablet Take 1 tablet (2 mg total) by mouth as needed for diarrhea 1 pill after each liquid bowel movement up to 6 pills in 24 hours    Lumigan 0.01 % ophthalmic drops     meloxicam (MOBIC) 7.5 mg tablet TAKE 1 TABLET ORALLY IN THE MORNING WITH MEAL    mometasone (ELOCON) 0.1 % cream APPLY TOPICALLY TO AFFECTED AREA AT BEDTIME (SKIN RASH) *REORDER*    nystatin (MYCOSTATIN) powder Apply topically 2 (two) times a day    polyethylene glycol (GLYCOLAX) 17 GM/SCOOP powder Take 17 g by mouth daily as needed (for constipation)    Restasis 0.05 % ophthalmic emulsion INSTILL 1 DROP INTO EACH EYE TWICE DAILY FOR DRY EYE *BRAND PER INSURANCE* *REORDER*    Simbrinza 1-0.2 % SUSP     simethicone (MYLICON,GAS-X) 180 MG capsule Take 1 capsule (180 mg total) by mouth 4 (four) times a day Patient may have at bedside    sodium chloride (OCEAN) 0.65 % nasal spray 1 spray into each nostril 3 (three) times a day    [DISCONTINUED] pilocarpine (SALAGEN) 5 mg tablet Take 1 tablet (5 mg total) by mouth 3 (three) times a day     Allergies   Allergen Reactions    Acetazolamide     Ciprofloxacin Headache and Nausea Only    Nitrofurantoin     Pollen Extract     Sulfa Antibiotics     Keflex [Cephalexin] Itching     Immunization History   Administered Date(s) Administered    COVID-19 PFIZER VACCINE 0.3 ML IM 02/03/2021, 02/26/2021, 01/22/2022    COVID-19 Pfizer mRNA vacc PF francesca-sucrose 12 yr and older (Comirnaty) 01/22/2024    INFLUENZA 11/09/2022, 09/20/2023    Influenza Split High Dose Preservative Free IM 10/18/2016, 10/16/2017    Influenza, high dose seasonal 0.7 mL 10/31/2018, 09/30/2019, 10/13/2020, 10/21/2021, 11/09/2022, 09/20/2023    Pneumococcal Conjugate 13-Valent  "02/17/2017     Objective   /66   Pulse 94   Temp (!) 96.5 °F (35.8 °C) (Tympanic)   Ht 5' 4\" (1.626 m)   SpO2 96%   BMI 31.41 kg/m²     Physical Exam  Vitals and nursing note reviewed.   Constitutional:       General: She is not in acute distress.     Appearance: She is well-developed.   HENT:      Head: Normocephalic and atraumatic.      Mouth/Throat:      Mouth: Mucous membranes are dry.   Eyes:      Conjunctiva/sclera: Conjunctivae normal.   Neck:      Vascular: No carotid bruit.   Cardiovascular:      Rate and Rhythm: Normal rate and regular rhythm.      Heart sounds: Normal heart sounds. No murmur heard.  Pulmonary:      Effort: Pulmonary effort is normal. No respiratory distress.      Breath sounds: Normal breath sounds. No wheezing, rhonchi or rales.   Abdominal:      Palpations: Abdomen is soft.      Tenderness: There is no abdominal tenderness.   Musculoskeletal:         General: No swelling.      Cervical back: Normal range of motion and neck supple. No rigidity or tenderness.      Comments: Arthritic changes of both knees and both shoulders   Lymphadenopathy:      Cervical: No cervical adenopathy.   Skin:     General: Skin is warm and dry.      Capillary Refill: Capillary refill takes less than 2 seconds.   Neurological:      Mental Status: She is alert.   Psychiatric:         Mood and Affect: Mood normal.         "

## 2025-02-19 NOTE — ASSESSMENT & PLAN NOTE
Episode of significant confusion requiring ER evaluation testing revealed only evidence of UTI treated with antibiotics and confusion state has improved we will continue to closely monitor

## 2025-02-19 NOTE — ASSESSMENT & PLAN NOTE
Definite signs of memory deficit on today's visit does not recall visit with orthopedics 2-1/2 weeks ago.  Other issues she is becoming more forgetful about as well will continue to monitor may have to dial back alprazolam if further evidence of forgetfulness develops

## 2025-02-20 ENCOUNTER — TELEPHONE (OUTPATIENT)
Age: 89
End: 2025-02-20

## 2025-02-20 NOTE — TELEPHONE ENCOUNTER
Pt daughter called in to check if physician certification form was signed and ready to be picked up please follow up with daughter when available

## 2025-02-20 NOTE — TELEPHONE ENCOUNTER
Called and let Kerri know that I mailed form out to her sister Maria De Jesus and if she would like I can print a copy and she can pick it up in the office

## 2025-03-11 ENCOUNTER — TELEPHONE (OUTPATIENT)
Age: 89
End: 2025-03-11

## 2025-03-11 DIAGNOSIS — F41.9 ANXIETY: ICD-10-CM

## 2025-03-11 NOTE — TELEPHONE ENCOUNTER
Pt daughter called concerned of mothers increasing confusion . Pt daughter is requesting to speak to Dr Baeza about this concern.

## 2025-03-11 NOTE — TELEPHONE ENCOUNTER
Conference call with the patient's 3 daughters performed today we have updated the family on her current condition gradual onset of dementia and ongoing issues with arthritis.  Reviewed the patient's current medications.

## 2025-03-19 DIAGNOSIS — F41.9 ANXIETY: ICD-10-CM

## 2025-03-19 RX ORDER — ALPRAZOLAM 0.25 MG
TABLET ORAL
Qty: 90 TABLET | Refills: 0 | Status: SHIPPED | OUTPATIENT
Start: 2025-03-19

## 2025-03-19 NOTE — TELEPHONE ENCOUNTER
Reason for call:   [x] Refill   [] Prior Auth  [] Other:     Office:   [x] PCP/Provider - [facility]  Authorized By: Dustin Baeza MD    [] Specialty/Provider -     Medication: ALPRAZolam (XANAX) 0.25 mg tablet    Dose/Frequency: TAKE 1 TABLET ORALLY IN THE MORNING (ANXIETY) (CONTROL) *REORDER*;TAKE 2 TABLETS (0.5MG) ORALLY AT BEDTIME (ANXIETY) (CONTROL) *REORDER*    Quantity: 90 tablet    [facility] [address] [facility]   Does the patient have enough for 3 days?   [x] Yes   [] No - Send as HP to POD    [facility] Pharmacy   Does the patient have enough for 10 days?   [] Yes   [] No - Send as HP to POD

## 2025-04-10 ENCOUNTER — TELEPHONE (OUTPATIENT)
Age: 89
End: 2025-04-10

## 2025-04-10 NOTE — TELEPHONE ENCOUNTER
"The daughter of patient Denisse called to requests that during Denisse's upcoming appointment, soul she please be teleconferenced in to the appointment.  Daughter states that Denisse has been having memory issues lately and would like to \"attend\" the appointment but lives out of town. Please advise daughter if this is possible.  "

## 2025-04-10 NOTE — TELEPHONE ENCOUNTER
I am fine with adding the daughter by telemetry conferencing put a note on the schedule so I remember to do that.  Thank you

## 2025-04-14 DIAGNOSIS — F41.9 ANXIETY: ICD-10-CM

## 2025-04-14 RX ORDER — ALPRAZOLAM 0.25 MG
TABLET ORAL
Qty: 90 TABLET | Refills: 0 | Status: SHIPPED | OUTPATIENT
Start: 2025-04-14

## 2025-04-14 NOTE — TELEPHONE ENCOUNTER
Medication: ALPRAZolam (XANAX) 0.25 mg tablet     Dose/Frequency: TAKE 1 TABLET ORALLY IN THE MORNING (ANXIETY) (CONTROL) *REORDER*;TAKE 2 TABLETS (0.5MG) ORALLY AT BEDTIME (ANXIETY) (CONTROL) *REORDER*     Quantity: 90    [facility]    Office:   [x] PCP/Provider - Dr Baeza  [] Speciality/Provider -     Does the patient have enough for 3 days?   [x] Yes   [] No - Send as HP to POD

## 2025-04-17 ENCOUNTER — TELEPHONE (OUTPATIENT)
Age: 89
End: 2025-04-17

## 2025-04-17 DIAGNOSIS — R68.2 DRY MOUTH: ICD-10-CM

## 2025-04-17 RX ORDER — FLUORIDE TOOTHPASTE
TOOTHPASTE DENTAL
Qty: 473 ML | Refills: 11 | Status: SHIPPED | OUTPATIENT
Start: 2025-04-17

## 2025-04-17 NOTE — TELEPHONE ENCOUNTER
Kobi with [facility] called. Pt c/o dry mouth. Kobi reports Pt has previously been prescribed Biotene Mouth wash and then transitioned to Pilocarpine due to lack of effectiveness. Kobi would like to know if the Biotene can be reordered to start with. Please submit order to [facility] within facility or call ext # [phone] with further questions.

## 2025-04-23 ENCOUNTER — OFFICE VISIT (OUTPATIENT)
Age: 89
End: 2025-04-23
Payer: MEDICARE

## 2025-04-23 VITALS
TEMPERATURE: 97.3 F | HEIGHT: 64 IN | BODY MASS INDEX: 31.41 KG/M2 | DIASTOLIC BLOOD PRESSURE: 74 MMHG | OXYGEN SATURATION: 95 % | HEART RATE: 88 BPM | SYSTOLIC BLOOD PRESSURE: 120 MMHG

## 2025-04-23 DIAGNOSIS — R41.0 CONFUSION: Primary | ICD-10-CM

## 2025-04-23 DIAGNOSIS — M17.0 PRIMARY OSTEOARTHRITIS OF BOTH KNEES: ICD-10-CM

## 2025-04-23 DIAGNOSIS — G30.0 MILD EARLY ONSET ALZHEIMER'S DEMENTIA, UNSPECIFIED WHETHER BEHAVIORAL, PSYCHOTIC, OR MOOD DISTURBANCE OR ANXIETY (HCC): ICD-10-CM

## 2025-04-23 DIAGNOSIS — F41.9 ANXIETY: ICD-10-CM

## 2025-04-23 DIAGNOSIS — Z13.0 SCREENING FOR DEFICIENCY ANEMIA: ICD-10-CM

## 2025-04-23 DIAGNOSIS — Z13.1 SCREENING FOR DIABETES MELLITUS: ICD-10-CM

## 2025-04-23 DIAGNOSIS — F02.A0 MILD EARLY ONSET ALZHEIMER'S DEMENTIA, UNSPECIFIED WHETHER BEHAVIORAL, PSYCHOTIC, OR MOOD DISTURBANCE OR ANXIETY (HCC): ICD-10-CM

## 2025-04-23 DIAGNOSIS — E53.8 FOLATE DEFICIENCY: ICD-10-CM

## 2025-04-23 DIAGNOSIS — E53.8 VITAMIN B 12 DEFICIENCY: ICD-10-CM

## 2025-04-23 DIAGNOSIS — I10 PRIMARY HYPERTENSION: ICD-10-CM

## 2025-04-23 DIAGNOSIS — Z13.29 SCREENING FOR HYPOTHYROIDISM: ICD-10-CM

## 2025-04-23 PROCEDURE — 99215 OFFICE O/P EST HI 40 MIN: CPT | Performed by: INTERNAL MEDICINE

## 2025-04-23 PROCEDURE — G2211 COMPLEX E/M VISIT ADD ON: HCPCS | Performed by: INTERNAL MEDICINE

## 2025-04-23 NOTE — ASSESSMENT & PLAN NOTE
Arthritic issues in the knees limits patient's ability for ambulation recommend continued follow-up with orthopedic services at [facility] for consideration of periodic steroid injections

## 2025-04-23 NOTE — ASSESSMENT & PLAN NOTE
Cognitive assessment today is indicative of some early cognitive decline changes with some calculation limitations and occasional short-term memory issues.  Patient is able to express herself during today's visit without difficulty.  Family concerned about potential for increasing confusion as time goes on indicated that that is a possibility.  2 medications that she is currently on could be potentially contributing a little bit to confusion are her alprazolam and gabapentin.  She does have significant anxiety issues and I did like to continue the alprazolam at 0.25 mg twice a day for now gabapentin is a component being used to control her chronic pain and I prefer to try to keep her on that for the time being.  Discussed testing with the patient and family have recommended an MRI scan of the head to evaluate for any evidence of vascular disease or protein deposits.  In addition blood work for vitamin B12 folic acid RPR and thyroid testing have all been requested patient will return after the testing is completed for complete review of her current studies

## 2025-04-23 NOTE — ASSESSMENT & PLAN NOTE
Current blood pressure assessment indicates good control of hypertension recommend continuation of lisinopril at 30 mg daily

## 2025-04-23 NOTE — ASSESSMENT & PLAN NOTE
Patient has significant anxiety issues recommend continuation of alprazolam 0.25 mg in the morning and 0.5 mg at bedtime

## 2025-04-23 NOTE — PROGRESS NOTES
Name: Denisse Jolly      : 1935      MRN: [id number]  Encounter Provider: Dustin Baeza MD  Encounter Date: 2025   Encounter department: [facility] INTERNAL MEDICINE    Assessment & Plan  Screening for diabetes mellitus    Orders:    Comprehensive metabolic panel; Future    Screening for deficiency anemia    Orders:    CBC and differential; Future    Screening for hypothyroidism    Orders:    TSH, 3rd generation with Free T4 reflex; Future    Mild early onset Alzheimer's dementia, unspecified whether behavioral, psychotic, or mood disturbance or anxiety (HCC)    Orders:    RPR-Syphilis Screening (Total Syphilis IGG/IGM); Future    MRI brain for memory loss; Future    Folate deficiency    Orders:    Folate; Future    Vitamin B 12 deficiency    Orders:    Vitamin B12; Future    Primary hypertension  Current blood pressure assessment indicates good control of hypertension recommend continuation of lisinopril at 30 mg daily         Confusion  Cognitive assessment today is indicative of some early cognitive decline changes with some calculation limitations and occasional short-term memory issues.  Patient is able to express herself during today's visit without difficulty.  Family concerned about potential for increasing confusion as time goes on indicated that that is a possibility.  2 medications that she is currently on could be potentially contributing a little bit to confusion are her alprazolam and gabapentin.  She does have significant anxiety issues and I did like to continue the alprazolam at 0.25 mg twice a day for now gabapentin is a component being used to control her chronic pain and I prefer to try to keep her on that for the time being.  Discussed testing with the patient and family have recommended an MRI scan of the head to evaluate for any evidence of vascular disease or protein deposits.  In addition blood work for vitamin B12 folic acid RPR and thyroid testing have  all been requested patient will return after the testing is completed for complete review of her current studies         Primary osteoarthritis of both knees  Arthritic issues in the knees limits patient's ability for ambulation recommend continued follow-up with orthopedic services at [facility] for consideration of periodic steroid injections         Anxiety  Patient has significant anxiety issues recommend continuation of alprazolam 0.25 mg in the morning and 0.5 mg at bedtime              History of Present Illness     This 89-year-old female patient presents today for a follow-up visit.  She presents in a wheelchair.  She indicates that she has been experiencing an increase in her arthritic pain.  Has been using Tylenol for pain control along with gabapentin and lidocaine transdermal patches.  She did receive benefit from a cortisone injection in both knees she would be it appears eligible for another injection of cortisone in June.  She is followed by Dr. Martinez of the orthopedics department at [facility].    During today's visit with the patient we were joined by her daughters by telephone communication.  They actively participated in the discussion today.  Family expresses concern about the patient's gradual cognitive decline.  She does have some forgetfulness and occasional disorientation.  I believe at the present time her cognitive decline is mild.  She appears to be able to express herself without any difficulty.  Quick assessment including short-term recall she recalled 3 out of 3 objects in 5 minutes.  She was able to tell us the year and the month.  Does have difficulty with mathematical calculations failing with serial 7 calculations after just 1 calculation    She appears well-dressed and no indication of lack of bathing.    The patient's family did bring up the possibility of moving her from a 1 bedroom apartment to a studio apartment in the same building that [place]  I indicated this would be entirely a family decision.    The patient has had some mild constipation she has a prescription for MiraLAX and we discussed the proper use of this to help make sure she has regular bowel movements I instructed that if she goes 2 days without a bowel movement to request the MiraLAX from the nursing staff.  In addition she has some occasional gastritis symptoms and currently has an active prescription for famotidine.    A family member is currently managing the patient's financials.    .  40 minutes was spent with the patient face-to-face today followed by 12 minutes of postvisit time for completion of office note and therapeutic planning      Review of Systems   Gastrointestinal:  Positive for constipation.        Gastritis   Musculoskeletal:  Positive for arthralgias.        Arthritic pain in shoulders and knees   Neurological:         Mild cognitive impairment   All other systems reviewed and are negative.    Past Medical History:   Diagnosis Date    Epistaxis     RESOLVED: 2/18/17    Saphenous vein thrombophlebitis, right 2012     Past Surgical History:   Procedure Laterality Date    GALLBLADDER SURGERY  2006     Family History   Problem Relation Age of Onset    Hypertension Mother      Social History     Tobacco Use    Smoking status: Never    Smokeless tobacco: Never   Vaping Use    Vaping status: Never Used   Substance and Sexual Activity    Alcohol use: Not Currently    Drug use: Never    Sexual activity: Not Currently     Current Outpatient Medications on File Prior to Visit   Medication Sig    Acetaminophen Extra Strength 500 MG TABS TAKE 2 CAPLETS (1000MG) ORALLY EVERY 8 HOURS (OSTEOARTHRITIS) *NOT TO EXCEED 4GMS APAP/24HR*    Allergy Relief 10 MG tablet GIVE 1 TABLET ORALLY DAILY (ALLERGIES)    ALPRAZolam (XANAX) 0.25 mg tablet TAKE 1 TABLET ORALLY IN THE MORNING (ANXIETY) (CONTROL) *REORDER*;TAKE 2 TABLETS (0.5MG) ORALLY AT BEDTIME (ANXIETY) (CONTROL) *REORDER*     aspirin 81 mg chewable tablet CHEW 1 TABLET AND SWALLOW ORALLY THREE TIMES WEEKLY MONDAY, WEDNESDAY & FRIDAY (CARDIAC DISEASE)    calcium carbonate (TUMS) 500 mg chewable tablet Chew 1 tablet daily    Chest Congestion Relief 100 MG/5ML oral liquid GIVE 10ML (200MG) ORALLY THREE TIMES DAILY AS NEEDED FOR COUGH *REORDER*    ciclopirox (LOPROX) 0.77 % cream     cyclopentolate (CYCLOGYL) 1 % ophthalmic solution INSTILL 1 DROP INTO RIGHT EYE THREE TIMES DAILY (DETACHED RENTINAL SURGERY) *REORDER*    docusate sodium (COLACE) 100 mg capsule Take 1 capsule (100 mg total) by mouth 2 (two) times a day    Emollient (Eucerin Advanced Repair) CREA Apply topically to both upper extremities twice a day for dry skin    ergocalciferol (VITAMIN D2) 50,000 units GIVE 1 CAPSULE ORALLY EVERY WEEK (SUPPLEMENT) (DO NOT CRUSH) *REORDER*    estrogens, conjugated (Premarin) vaginal cream Apply 1/4 inch with finger to vaginal area at bedtime on Mondays, Wednesdays  and Fridays    famotidine (PEPCID) 20 mg tablet GIVE 1 TABLET ORALLY DAILY (GASTROESOPHAGEAL REFLUX DISEASE)    fluticasone (FLONASE) 50 mcg/act nasal spray INHALE 1 SPRAY INTO EACH NOSTRIL TWICE DAILY (ALLERGIES) *REORDER*    gabapentin (NEURONTIN) 100 mg capsule 100 mg in the morning 100 mg in the afternoon and 200 mg at bedtime    ketoconazole (NIZORAL) 2 % shampoo     Lidocaine Pain Relief 4 % PTCH APPLY 1/2 PATCH TOPICALLY TO EACH KNEE AS NEEDED FOR PAIN REMOVE AFTER 12 HOURS *REORDER*;APPLY 1 PATCH TOPICALLY TO RIGHT SHOULDER AS NEEDED FOR PAIN  REMOVE AFTER 12 HOURS *REORDER*    lisinopril (ZESTRIL) 30 mg tablet Take 1 tablet (30 mg total) by mouth daily    loperamide (IMODIUM A-D) 2 MG tablet Take 1 tablet (2 mg total) by mouth as needed for diarrhea 1 pill after each liquid bowel movement up to 6 pills in 24 hours    Lumigan 0.01 % ophthalmic drops     meloxicam (MOBIC) 7.5 mg tablet TAKE 1 TABLET ORALLY IN THE MORNING WITH MEAL    mometasone (ELOCON) 0.1 % cream APPLY  "TOPICALLY TO AFFECTED AREA AT BEDTIME (SKIN RASH) *REORDER*    Mouthwashes (Biotene Dry Mouth) LIQD 5 cc swish in the mouth every 6 hours as needed for dry mouth    nystatin (MYCOSTATIN) powder Apply topically 2 (two) times a day    polyethylene glycol (GLYCOLAX) 17 GM/SCOOP powder Take 17 g by mouth daily as needed (for constipation)    Restasis 0.05 % ophthalmic emulsion INSTILL 1 DROP INTO EACH EYE TWICE DAILY FOR DRY EYE *BRAND PER INSURANCE* *REORDER*    Simbrinza 1-0.2 % SUSP     simethicone (MYLICON,GAS-X) 180 MG capsule Take 1 capsule (180 mg total) by mouth 4 (four) times a day Patient may have at bedside    sodium chloride (OCEAN) 0.65 % nasal spray 1 spray into each nostril 3 (three) times a day     Allergies   Allergen Reactions    Acetazolamide     Ciprofloxacin Headache and Nausea Only    Nitrofurantoin     Pollen Extract     Sulfa Antibiotics     Keflex [Cephalexin] Itching     Immunization History   Administered Date(s) Administered    COVID-19 PFIZER VACCINE 0.3 ML IM 02/03/2021, 02/26/2021, 01/22/2022    COVID-19 Pfizer mRNA vacc PF francesca-sucrose 12 yr and older (Comirnaty) 01/22/2024    INFLUENZA 11/09/2022, 09/20/2023    Influenza Split High Dose Preservative Free IM 10/18/2016, 10/16/2017    Influenza, high dose seasonal 0.7 mL 10/31/2018, 09/30/2019, 10/13/2020, 10/21/2021, 11/09/2022, 09/20/2023    Pneumococcal Conjugate 13-Valent 02/17/2017     Objective   /74   Pulse 88   Temp (!) 97.3 °F (36.3 °C) (Tympanic)   Ht 5' 4\" (1.626 m)   SpO2 95%   BMI 31.41 kg/m²     Physical Exam  Vitals and nursing note reviewed.   Constitutional:       General: She is not in acute distress.     Appearance: Normal appearance. She is well-developed. She is not ill-appearing.   HENT:      Head: Normocephalic and atraumatic.   Eyes:      Conjunctiva/sclera: Conjunctivae normal.   Cardiovascular:      Rate and Rhythm: Normal rate and regular rhythm.      Heart sounds: No murmur heard.  Pulmonary:      " Effort: Pulmonary effort is normal. No respiratory distress.      Breath sounds: Normal breath sounds. No wheezing, rhonchi or rales.   Abdominal:      Palpations: Abdomen is soft.      Tenderness: There is no abdominal tenderness.   Musculoskeletal:         General: Swelling and tenderness present.      Cervical back: Neck supple.      Comments: Arthritic changes of the knees bilaterally   Skin:     General: Skin is warm and dry.      Capillary Refill: Capillary refill takes less than 2 seconds.   Neurological:      Mental Status: She is alert.   Psychiatric:         Mood and Affect: Mood normal.

## 2025-05-08 ENCOUNTER — APPOINTMENT (EMERGENCY)
Dept: RADIOLOGY | Facility: HOSPITAL | Age: 89
End: 2025-05-08
Payer: MEDICARE

## 2025-05-08 ENCOUNTER — HOSPITAL ENCOUNTER (EMERGENCY)
Facility: HOSPITAL | Age: 89
Discharge: HOME | End: 2025-05-08
Attending: EMERGENCY MEDICINE
Payer: MEDICARE

## 2025-05-08 ENCOUNTER — TELEPHONE (OUTPATIENT)
Age: 89
End: 2025-05-08

## 2025-05-08 VITALS
RESPIRATION RATE: 18 BRPM | HEART RATE: 93 BPM | WEIGHT: 184.97 LBS | DIASTOLIC BLOOD PRESSURE: 60 MMHG | BODY MASS INDEX: 31.75 KG/M2 | SYSTOLIC BLOOD PRESSURE: 128 MMHG | OXYGEN SATURATION: 93 % | TEMPERATURE: 98.4 F

## 2025-05-08 DIAGNOSIS — S82.409A FIBULA FRACTURE: Primary | ICD-10-CM

## 2025-05-08 PROCEDURE — 99283 EMERGENCY DEPT VISIT LOW MDM: CPT

## 2025-05-08 PROCEDURE — 73610 X-RAY EXAM OF ANKLE: CPT

## 2025-05-08 NOTE — TELEPHONE ENCOUNTER
Lorin Berman called and said that pt took a fall yesterday in the bathroom at the facility and hurt her L ankle. Last night she was in some pain but didn't complain too much. This morning it is swollen and bruised. She is unable to put any weight on it so they are going to take her to [facility] for xrays. Lorin Berman said they will call then once they know what is going on.

## 2025-05-08 NOTE — ED PROVIDER NOTES
Time reflects when diagnosis was documented in both MDM as applicable and the Disposition within this note       Time User Action Codes Description Comment    5/8/2025 10:22 AM Gutzweiler, Julie Add [S82.409A] Fibula fracture     5/8/2025 10:35 AM Gutzweiler, Julie Modify [S82.409A] Fibula fracture LEFT          ED Disposition       ED Disposition   Discharge    Condition   Stable    Date/Time   Thu May 8, 2025 10:22 AM    Comment   Denisse Jolly discharge to home/self care.                   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  She had a mechanical fall yesterday and injured her left ankle.  She states she has been walking on it since then but has pain with ambulation.  She has decreased range of motion secondary to pain and swelling.  She complains of stiffness.  She denies any other injury.  She denies any head strike.  She is not on blood thinners.  She denies any neck or back pain.  She denies any chest pain or shortness of breath.  She denies any abdominal pain.  She is moving the remainder of her upper and lower EXTR freely with no complaints.  Patient took a dose of Tylenol prior to arrival.    Physical exam this 89-year-old female is alert and oriented x 3.  She is in no acute distress.  Her head is atraumatic.  Her neck is supple and nontender.  Her chest is atraumatic upon  inspection.  Lungs are clear to auscultation.  Heart is regular rate and rhythm without murmur.  Her abdomen is soft nondistended nontender without mass or hepatosplenomegaly.  Inspection of her left lower extremity.  There is ecchymosis noted over the lateral and medial aspect of her right ankle.  She has tenderness over these areas.  The remainder of her foot is nontender.  She has palpable pulses over the dorsalis pedis and  posterior tibial arteries that are +2.  Sensation is intact to the deep and superficial peroneal, posterior tibial, sural distribution of the left lower extremity.  The remainder of the lower leg and knee are nontender with good range of motion.  Moves her upper extremities freely.  She has no tenderness to palpation or upper or right lower extremity.  She does have a hammertoe over her right second toe that crosses her great toe.    Differential diagnosis includes but is not limited to fracture, sprain, strain posterior splint was applied.  It was personally checked by me.  Neurovascular was intact.    X-rays of the left ankle were positive for a spiral fracture of the distal fibula    Amount and/or Complexity of Data Reviewed  Radiology: ordered and independent interpretation performed.     Details: Spiral fracture distal fibula             Medications - No data to display    ED Risk Strat Scores                    No data recorded                            History of Present Illness       Chief Complaint   Patient presents with    Ankle Injury     Resides at [facility], fell in the bathroom yesterday. Presents with L ankle and shoulder pain.       Past Medical History:   Diagnosis Date    Dementia (HCC)     Epistaxis     RESOLVED: 2/18/17    Saphenous vein thrombophlebitis, right 2012      Past Surgical History:   Procedure Laterality Date    GALLBLADDER SURGERY  2006      Family History   Problem Relation Age of Onset    Hypertension Mother       Social History     Tobacco Use    Smoking status: Never    Smokeless tobacco: Never   Vaping Use    Vaping status: Never Used   Substance Use Topics    Alcohol use: Not Currently    Drug use: Never      E-Cigarette/Vaping    E-Cigarette Use Never User       E-Cigarette/Vaping Substances    Nicotine No     THC No     CBD No     Flavoring No     Other No     Unknown No       I have reviewed and agree with the history as documented.       History provided by:   Patient  Ankle Injury  Location:  Left ankle  Quality:  Ache  Severity:  Moderate  Onset quality:  Sudden  Duration:  1 day  Progression:  Waxing and waning  Chronicity:  New  Context:  Patient had mechanical fall and had an inversion injury to her left ankle.  She complains of pain and swelling over the medial lateral aspect of her ankle.  Pain is worse with range of motion and ambulation.  Relieved by:  Rest and Tylenol  Worsened by:  Ambulation and range of motion  Associated symptoms: no abdominal pain, no chest pain, no fatigue, no headaches, no loss of consciousness, no rash and no shortness of breath        Review of Systems   Constitutional:  Positive for activity change. Negative for appetite change, chills, diaphoresis and fatigue.   Respiratory:  Negative for shortness of breath.    Cardiovascular:  Negative for chest pain.   Gastrointestinal:  Negative for abdominal pain.   Musculoskeletal:  Positive for arthralgias, gait problem and joint swelling.   Skin:  Positive for color change. Negative for rash and wound.   Neurological:  Negative for loss of consciousness and headaches.   Psychiatric/Behavioral:  Negative for confusion.    All other systems reviewed and are negative.          Objective       ED Triage Vitals   Temperature Pulse Blood Pressure Respirations SpO2 Patient Position - Orthostatic VS   05/08/25 0940 05/08/25 0941 05/08/25 0941 05/08/25 0941 05/08/25 0941 --   98.4 °F (36.9 °C) 93 128/60 18 93 %       Temp Source Heart Rate Source BP Location FiO2 (%) Pain Score    05/08/25 0940 05/08/25 0941 05/08/25 0941 -- 05/08/25 0941    Oral Monitor Right arm  6      Vitals      Date and Time Temp Pulse SpO2 Resp BP Pain Score FACES Pain Rating User   05/08/25 0941 -- 93 93 % 18 128/60 6 -- LAB   05/08/25 0940 98.4 °F (36.9 °C) -- -- -- -- -- -- LAB            Physical Exam  Vitals and nursing note reviewed.   Constitutional:       Appearance: Normal appearance. She is normal weight.   HENT:       Head: Normocephalic and atraumatic.      Nose: Nose normal.   Eyes:      Conjunctiva/sclera: Conjunctivae normal.   Cardiovascular:      Rate and Rhythm: Normal rate and regular rhythm.      Pulses: Normal pulses.      Heart sounds: Normal heart sounds.   Pulmonary:      Effort: Pulmonary effort is normal.      Breath sounds: Normal breath sounds.   Musculoskeletal:      Cervical back: Neck supple. No tenderness.      Comments: Inspection of the left ankle there is ecchymosis present over the medial lateral aspect of the ankle.  There is tenderness palpated over the medial malleolus as well as the distal fibula.  The remainder of the foot lower leg and knee are nontender with good range of motion.  There are palpable pulses over the dorsalis pedis and posterior tibial arteries that are +2.  Sensation is intact to the deep and superficial peroneal, sural, posterior tibial distribution of the left lower extremity.  Patient has good range of motion of her upper and right lower extremities without discomfort.   Lymphadenopathy:      Cervical: No cervical adenopathy.   Skin:     Capillary Refill: Capillary refill takes less than 2 seconds.      Comments: Ecchymosis present over the medial lateral aspect of the left ankle.   Neurological:      General: No focal deficit present.      Mental Status: She is alert and oriented to person, place, and time.   Psychiatric:         Mood and Affect: Mood normal.         Behavior: Behavior normal.         Thought Content: Thought content normal.         Judgment: Judgment normal.         Results Reviewed       None            XR ankle 3+ views LEFT   ED Interpretation by Julie Lynn Gutzweiler, PA-C (05/08 1021)   Spiral fracture distal fibula      Final Interpretation by Angy Davis MD (05/08 1106)      Oblique spiral fracture of the fibula with fracture line extending to the ankle mortise and congruent ankle mortise         Computerized Assisted Algorithm (CAA) may have been  used to analyze all applicable images.               Resident: Jan Guardado I, the attending radiologist, have reviewed the images and agree with the final report above.      Workstation performed: [id number]             Splint application    Date/Time: 5/8/2025 10:45 AM    Performed by: Julie Lynn Gutzweiler, PA-C  Authorized by: Julie Lynn Gutzweiler, PA-C    Other Assisting Provider: Yes (comment)    Verbal consent obtained?: Yes    Consent given by:  Patient  Time Out:     Time out: Immediately prior to the procedure a time out was called      Time out performed at:  5/8/2025 10:45 PM  Patient states understanding of procedure being performed: Yes    Patient's understanding of procedure matches consent: Yes    Site marked: Yes    Radiology Images displayed and confirmed. If images not available, report reviewed: Yes    Patient identity confirmed:  Verbally with patient and arm band  Pre-procedure details:     Sensation:  Normal    Skin color:  Pink  Procedure details:     Laterality:  Left    Location:  Ankle    Ankle:  L ankle    Strapping: No      Splint composition: static      Splint type:  Short leg    Supplies:  Elastic bandage and Ortho-Glass  Post-procedure details:     Pain:  Improved    Sensation:  Normal    Patient tolerance of procedure:  Tolerated well, no immediate complications  Comments:      Splint was checked by me, neurovascular intact.  Past betazole pulses over the dorsalis pedis.  Capillary refills less than 2 seconds.      ED Medication and Procedure Management   Prior to Admission Medications   Prescriptions Last Dose Informant Patient Reported? Taking?   ALPRAZolam (XANAX) 0.25 mg tablet   No No   Sig: TAKE 1 TABLET ORALLY IN THE MORNING (ANXIETY) (CONTROL) *REORDER*;TAKE 2 TABLETS (0.5MG) ORALLY AT BEDTIME (ANXIETY) (CONTROL) *REORDER*   Acetaminophen Extra Strength 500 MG TABS  Self No No   Sig: TAKE 2 CAPLETS (1000MG) ORALLY EVERY 8 HOURS (OSTEOARTHRITIS) *NOT TO EXCEED 4GMS  APAP/24HR*   Allergy Relief 10 MG tablet  Self No No   Sig: GIVE 1 TABLET ORALLY DAILY (ALLERGIES)   Chest Congestion Relief 100 MG/5ML oral liquid  Self No No   Sig: GIVE 10ML (200MG) ORALLY THREE TIMES DAILY AS NEEDED FOR COUGH *REORDER*   Emollient (Eucerin Advanced Repair) CREA  Self No No   Sig: Apply topically to both upper extremities twice a day for dry skin   Lidocaine Pain Relief 4 % PTCH  Self No No   Sig: APPLY 1/2 PATCH TOPICALLY TO EACH KNEE AS NEEDED FOR PAIN REMOVE AFTER 12 HOURS *REORDER*;APPLY 1 PATCH TOPICALLY TO RIGHT SHOULDER AS NEEDED FOR PAIN  REMOVE AFTER 12 HOURS *REORDER*   Lumigan 0.01 % ophthalmic drops  Self Yes No   Mouthwashes (Biotene Dry Mouth) LIQD   No No   Si cc swish in the mouth every 6 hours as needed for dry mouth   Restasis 0.05 % ophthalmic emulsion  Self No No   Sig: INSTILL 1 DROP INTO EACH EYE TWICE DAILY FOR DRY EYE *BRAND PER INSURANCE* *REORDER*   Simbrinza 1-0.2 % SUSP  Self Yes No   aspirin 81 mg chewable tablet  Self No No   Sig: CHEW 1 TABLET AND SWALLOW ORALLY THREE TIMES WEEKLY MONDAY, WEDNESDAY & FRIDAY (CARDIAC DISEASE)   calcium carbonate (TUMS) 500 mg chewable tablet  Self Yes No   Sig: Chew 1 tablet daily   ciclopirox (LOPROX) 0.77 % cream  Self Yes No   cyclopentolate (CYCLOGYL) 1 % ophthalmic solution  Self No No   Sig: INSTILL 1 DROP INTO RIGHT EYE THREE TIMES DAILY (DETACHED RENTINAL SURGERY) *REORDER*   docusate sodium (COLACE) 100 mg capsule  Self No No   Sig: Take 1 capsule (100 mg total) by mouth 2 (two) times a day   ergocalciferol (VITAMIN D2) 50,000 units  Self No No   Sig: GIVE 1 CAPSULE ORALLY EVERY WEEK (SUPPLEMENT) (DO NOT CRUSH) *REORDER*   estrogens, conjugated (Premarin) vaginal cream   No No   Sig: Apply 1/4 inch with finger to vaginal area at bedtime on ,   and    famotidine (PEPCID) 20 mg tablet  Self No No   Sig: GIVE 1 TABLET ORALLY DAILY (GASTROESOPHAGEAL REFLUX DISEASE)   fluticasone (FLONASE) 50 mcg/act  nasal spray  Self No No   Sig: INHALE 1 SPRAY INTO EACH NOSTRIL TWICE DAILY (ALLERGIES) *REORDER*   gabapentin (NEURONTIN) 100 mg capsule   No No   Si mg in the morning 100 mg in the afternoon and 200 mg at bedtime   ketoconazole (NIZORAL) 2 % shampoo  Self Yes No   lisinopril (ZESTRIL) 30 mg tablet   No No   Sig: Take 1 tablet (30 mg total) by mouth daily   loperamide (IMODIUM A-D) 2 MG tablet   No No   Sig: Take 1 tablet (2 mg total) by mouth as needed for diarrhea 1 pill after each liquid bowel movement up to 6 pills in 24 hours   meloxicam (MOBIC) 7.5 mg tablet  Self No No   Sig: TAKE 1 TABLET ORALLY IN THE MORNING WITH MEAL   mometasone (ELOCON) 0.1 % cream  Self No No   Sig: APPLY TOPICALLY TO AFFECTED AREA AT BEDTIME (SKIN RASH) *REORDER*   nystatin (MYCOSTATIN) powder  Self No No   Sig: Apply topically 2 (two) times a day   polyethylene glycol (GLYCOLAX) 17 GM/SCOOP powder  Self No No   Sig: Take 17 g by mouth daily as needed (for constipation)   simethicone (MYLICON,GAS-X) 180 MG capsule   No No   Sig: Take 1 capsule (180 mg total) by mouth 4 (four) times a day Patient may have at bedside   sodium chloride (OCEAN) 0.65 % nasal spray  Self No No   Si spray into each nostril 3 (three) times a day      Facility-Administered Medications: None     Discharge Medication List as of 2025 10:37 AM        CONTINUE these medications which have NOT CHANGED    Details   Acetaminophen Extra Strength 500 MG TABS TAKE 2 CAPLETS (1000MG) ORALLY EVERY 8 HOURS (OSTEOARTHRITIS) *NOT TO EXCEED 4GMS APAP/24HR*, Normal      Allergy Relief 10 MG tablet GIVE 1 TABLET ORALLY DAILY (ALLERGIES), Normal      ALPRAZolam (XANAX) 0.25 mg tablet TAKE 1 TABLET ORALLY IN THE MORNING (ANXIETY) (CONTROL) *REORDER*;TAKE 2 TABLETS (0.5MG) ORALLY AT BEDTIME (ANXIETY) (CONTROL) *REORDER*, Normal      aspirin 81 mg chewable tablet CHEW 1 TABLET AND SWALLOW ORALLY THREE TIMES WEEKLY MONDAY, WEDNESDAY & FRIDAY (CARDIAC DISEASE), Normal       calcium carbonate (TUMS) 500 mg chewable tablet Chew 1 tablet daily, Historical Med      Chest Congestion Relief 100 MG/5ML oral liquid GIVE 10ML (200MG) ORALLY THREE TIMES DAILY AS NEEDED FOR COUGH *REORDER*, Normal      ciclopirox (LOPROX) 0.77 % cream Starting Mon 9/11/2023, Historical Med      cyclopentolate (CYCLOGYL) 1 % ophthalmic solution INSTILL 1 DROP INTO RIGHT EYE THREE TIMES DAILY (DETACHED RENTINAL SURGERY) *REORDER*, Normal      docusate sodium (COLACE) 100 mg capsule Take 1 capsule (100 mg total) by mouth 2 (two) times a day, Starting Wed 8/28/2024, Normal      Emollient (Eucerin Advanced Repair) CREA Apply topically to both upper extremities twice a day for dry skin, Normal      ergocalciferol (VITAMIN D2) 50,000 units GIVE 1 CAPSULE ORALLY EVERY WEEK (SUPPLEMENT) (DO NOT CRUSH) *REORDER*, Normal      estrogens, conjugated (Premarin) vaginal cream Apply 1/4 inch with finger to vaginal area at bedtime on Mondays, Wednesdays  and Fridays, Normal      famotidine (PEPCID) 20 mg tablet GIVE 1 TABLET ORALLY DAILY (GASTROESOPHAGEAL REFLUX DISEASE), Normal      fluticasone (FLONASE) 50 mcg/act nasal spray INHALE 1 SPRAY INTO EACH NOSTRIL TWICE DAILY (ALLERGIES) *REORDER*, Normal      gabapentin (NEURONTIN) 100 mg capsule 100 mg in the morning 100 mg in the afternoon and 200 mg at bedtime, Normal      ketoconazole (NIZORAL) 2 % shampoo Starting Wed 8/30/2023, Historical Med      Lidocaine Pain Relief 4 % PTCH APPLY 1/2 PATCH TOPICALLY TO EACH KNEE AS NEEDED FOR PAIN REMOVE AFTER 12 HOURS *REORDER*;APPLY 1 PATCH TOPICALLY TO RIGHT SHOULDER AS NEEDED FOR PAIN  REMOVE AFTER 12 HOURS *REORDER*, Normal      lisinopril (ZESTRIL) 30 mg tablet Take 1 tablet (30 mg total) by mouth daily, Starting Mon 12/30/2024, Normal      loperamide (IMODIUM A-D) 2 MG tablet Take 1 tablet (2 mg total) by mouth as needed for diarrhea 1 pill after each liquid bowel movement up to 6 pills in 24 hours, Starting Tue 2/11/2025,  Normal      Lumigan 0.01 % ophthalmic drops Starting Tue 4/26/2022, Historical Med      meloxicam (MOBIC) 7.5 mg tablet TAKE 1 TABLET ORALLY IN THE MORNING WITH MEAL, Normal      mometasone (ELOCON) 0.1 % cream APPLY TOPICALLY TO AFFECTED AREA AT BEDTIME (SKIN RASH) *REORDER*, Normal      Mouthwashes (Biotene Dry Mouth) LIQD 5 cc swish in the mouth every 6 hours as needed for dry mouth, Normal      nystatin (MYCOSTATIN) powder Apply topically 2 (two) times a day, Starting Mon 4/8/2024, Normal      polyethylene glycol (GLYCOLAX) 17 GM/SCOOP powder Take 17 g by mouth daily as needed (for constipation), Starting Fri 10/4/2024, Normal      Restasis 0.05 % ophthalmic emulsion INSTILL 1 DROP INTO EACH EYE TWICE DAILY FOR DRY EYE *BRAND PER INSURANCE* *REORDER*, Normal      Simbrinza 1-0.2 % SUSP Starting Wed 7/27/2022, Historical Med      simethicone (MYLICON,GAS-X) 180 MG capsule Take 1 capsule (180 mg total) by mouth 4 (four) times a day Patient may have at bedside, Starting Tue 11/12/2024, Normal      sodium chloride (OCEAN) 0.65 % nasal spray 1 spray into each nostril 3 (three) times a day, Starting Wed 2/21/2024, Normal             ED SEPSIS DOCUMENTATION   Time reflects when diagnosis was documented in both MDM as applicable and the Disposition within this note       Time User Action Codes Description Comment    5/8/2025 10:22 AM Gutzweiler, Julie Add [S82.409A] Fibula fracture     5/8/2025 10:35 AM Gutzweiler, Julie Modify [S82.409A] Fibula fracture LEFT                 Julie Lynn Gutzweiler, PA-C  05/08/25 1920

## 2025-05-23 ENCOUNTER — TELEPHONE (OUTPATIENT)
Age: 89
End: 2025-05-23

## 2025-05-23 NOTE — TELEPHONE ENCOUNTER
Laverne ([facility]) called:    Patient has a new skin tear on left lower forearm. Approx 4 centimeters in length. Width is 0.5 centimeters. They are using normal saline and optiform. Hua is asking for an order from Dr Baeza on how to treat. If he wanted to use xerosorm. Please advise and fax order for care to: [phone]    Laverne can be reached at [phone] with any questions

## 2025-06-24 ENCOUNTER — TELEPHONE (OUTPATIENT)
Age: 89
End: 2025-06-24

## 2025-06-24 DIAGNOSIS — F41.9 ANXIETY: ICD-10-CM

## 2025-06-24 RX ORDER — ALPRAZOLAM 0.25 MG
TABLET ORAL
Qty: 90 TABLET | Refills: 0 | Status: CANCELLED | OUTPATIENT
Start: 2025-06-24

## 2025-06-24 NOTE — TELEPHONE ENCOUNTER
I am not sure why were involved in getting prior authorization for patient's eyedrops it seems to me this should be coming from her ophthalmologist.

## 2025-06-24 NOTE — TELEPHONE ENCOUNTER
Looking at the patient's controlled substance [place] website it appears that Walterfaisal Alejandro has been prescribing alprazolam for the patient.  I am unaware of who this physician or provider is.  Please contact mary beth aviles to find out who this provider is.  I will not refill the prescription until we understand who this person is and why they are prescribing medication for this patient.  Thank you

## 2025-06-24 NOTE — TELEPHONE ENCOUNTER
When searching the chart, the last time this medication was prescribed in 2022 and was discontinued off the chart. Zioptan 0.0015% ophthalmic solution is not on patients active medication list, if patient is to be on this medication please prescribe rx and send message back to the Prior Authorization Team so a prior authorization can be submitted. Thank you

## 2025-07-01 DIAGNOSIS — F41.9 ANXIETY: ICD-10-CM

## 2025-07-01 NOTE — TELEPHONE ENCOUNTER
RN stated “Non emergent: Please make provider aware that she needs more ALPRAZolam (XANAX) 0.25 mg tablet. The pharmacy is waiting for a new script with refills so that they can send the medication. Please call and provide us with an update upon completion. Thank you.”        Medication Refill Request       Medication: ALPRAZolam (XANAX) 0.25 mg tablet     Dose/Frequency:  TAKE 1 TABLET ORALLY IN THE MORNING (ANXIETY) (CONTROL) *REORDER*;TAKE 2 TABLETS (0.5MG) ORALLY AT BEDTIME (ANXIETY) (CONTROL) *REORDER*     Quantity: 90 tablet     Pharmacy: [facility] [address] [address]  Phone: [phone]  Fax: [phone]  VELMA #: --     Office:   [x] PCP/Provider -   [] Specialty/Provider -     Does the patient have enough for 3 days?   [] Yes- Send to POD (normal priority)   [x] No - Send as HP to POD    Is the patient completely out of the medication or does not have enough until the next business day?  [] Yes - send to Call Hub  [x] No - Send as HP to POD

## 2025-07-02 NOTE — TELEPHONE ENCOUNTER
Please call [facility] where the patient lives this prescription has been refilled the last 3 times by Dr Maryam Allen.  I do not know who he is or why he is involved with her care at this point possibly he could be a psychiatrist seeing the patient.  We need clarification before I can reorder this medication for the patient.  Thank you

## 2025-07-03 RX ORDER — ALPRAZOLAM 0.25 MG
TABLET ORAL
Qty: 90 TABLET | Refills: 0 | OUTPATIENT
Start: 2025-07-03

## 2025-07-05 ENCOUNTER — TELEPHONE (OUTPATIENT)
Dept: OTHER | Facility: OTHER | Age: 89
End: 2025-07-05

## 2025-07-05 NOTE — TELEPHONE ENCOUNTER
"Nursing home or Independent living name: [facility]   If its not nursing home or Independent living, do they see PCP in Network: Dr. Baeza  Who is calling: Mayra  Callback number: [phone]  Symptoms: Mayra from [facility] stated, \" I am calling to make the doctor aware that her Tafluprost 0.0015% eye drops are no longer covered by her insurance and it costs $283.00. The pharmacy called and asked if we could have the provider send in a script for Lantoprost which is an eye drop that the insurance will cover. The resident has missed a dose and will continue to miss her medication until the new medication is ordered.\"   Did you page oncall or place in triage hub: Paged on call VIA [facility]   "

## 2025-07-07 NOTE — TELEPHONE ENCOUNTER
[facility] called to inform Ann Marie that  Denisse Ahumada isnt a patient at the office. She says the doctor sees her at [facility] and the xanax has been filled. Please advise back if needed

## 2025-07-07 NOTE — TELEPHONE ENCOUNTER
Called and left voicemail on the [facility] voicemail. I did state that they should call pt's ophthalmologist to get the eye drops

## 2025-07-07 NOTE — TELEPHONE ENCOUNTER
Yes please direct them to contact the patient's ophthalmologist for selection of an alternative to her current eyedrop.  Thank you

## 2025-07-08 ENCOUNTER — OFFICE VISIT (OUTPATIENT)
Age: 89
End: 2025-07-08
Payer: MEDICARE

## 2025-07-08 ENCOUNTER — TELEPHONE (OUTPATIENT)
Age: 89
End: 2025-07-08

## 2025-07-08 VITALS — OXYGEN SATURATION: 97 % | HEART RATE: 77 BPM

## 2025-07-08 DIAGNOSIS — G89.29 CHRONIC RIGHT SHOULDER PAIN: Primary | ICD-10-CM

## 2025-07-08 DIAGNOSIS — F29: ICD-10-CM

## 2025-07-08 DIAGNOSIS — M25.511 CHRONIC RIGHT SHOULDER PAIN: Primary | ICD-10-CM

## 2025-07-08 PROCEDURE — G2211 COMPLEX E/M VISIT ADD ON: HCPCS | Performed by: STUDENT IN AN ORGANIZED HEALTH CARE EDUCATION/TRAINING PROGRAM

## 2025-07-08 PROCEDURE — 99214 OFFICE O/P EST MOD 30 MIN: CPT | Performed by: STUDENT IN AN ORGANIZED HEALTH CARE EDUCATION/TRAINING PROGRAM

## 2025-07-08 NOTE — ASSESSMENT & PLAN NOTE
Patient presents to clinic today for concern of ongoing right shoulder pain.  Per chart review patient has been experiencing chronic shoulder pain for at least a year.  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  There is severe glenohumeral arthritis with joint effusion as well.  Patient does not recall this MRI being done and does not recall these results.  I do have concern for memory loss in patient as I have had to repeat instructions and she asked the same question on a few instances during the encounter.  PCP is aware of this and MRI brain for memory loss has been ordered but not yet completed.  She has also forgotten some previous instructions to care for her right shoulder pain.  She is currently using Tylenol with relief her symptoms however they only last for about 4 hours before persist.  Recommended the patient use Tylenol 1000 mg 3 times a day as needed for shoulder pain.  She should also use Voltaren gel on the shoulder in between Tylenol doses for further relief of her pain.  She is agreeable to participating in physical therapy.  Due to her rotator cuff tear she is unable to abduct her arm to 90 degrees and has pain past that.  She has difficulties dressing herself because of this as well.  Physical therapy could help maintain and even offer some improvement in her range of motion.  She is also looking for other pain relief modalities.  She recently got injections in her bilateral knees for arthritis and she may be a candidate for steroid injections into her right shoulder due to her severe glenohumeral arthritis.  She has an appointment with [facility] orthopedic sports medicine physician on 0/29/25.  Encourage patient to keep and attend this appointment for consideration treatment options.  Orders:    Ambulatory  Referral to Physical Therapy; Future    Diclofenac Sodium (VOLTAREN) 1 %; Apply 2 g topically 4 (four) times a day as needed (shoulder pain)

## 2025-07-08 NOTE — PROGRESS NOTES
[facility] INTERNAL MEDICINE- [place]  OFFICE VISIT     PATIENT INFORMATION     Denisse Jolly   89 y.o. female   MRN: [id number]    ASSESSMENT/PLAN     Assessment & Plan  Chronic right shoulder pain  Patient presents to clinic today for concern of ongoing right shoulder pain.  Per chart review patient has been experiencing chronic shoulder pain for at least a year.  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  There is severe glenohumeral arthritis with joint effusion as well.  Patient does not recall this MRI being done and does not recall these results.  I do have concern for memory loss in patient as I have had to repeat instructions and she asked the same question on a few instances during the encounter.  PCP is aware of this and MRI brain for memory loss has been ordered but not yet completed.  She has also forgotten some previous instructions to care for her right shoulder pain.  She is currently using Tylenol with relief her symptoms however they only last for about 4 hours before persist.  Recommended the patient use Tylenol 1000 mg 3 times a day as needed for shoulder pain.  She should also use Voltaren gel on the shoulder in between Tylenol doses for further relief of her pain.  She is agreeable to participating in physical therapy.  Due to her rotator cuff tear she is unable to abduct her arm to 90 degrees and has pain past that.  She has difficulties dressing herself because of this as well.  Physical therapy could help maintain and even offer some improvement in her range of motion.  She is also looking for other pain relief modalities.  She recently got injections in her bilateral knees for arthritis and she may be a candidate for steroid injections into her right shoulder due to her severe glenohumeral arthritis.  She has an  appointment with [facility] orthopedic sports medicine physician on 0/29/25.  Encourage patient to keep and attend this appointment for consideration treatment options.  Orders:    Ambulatory Referral to Physical Therapy; Future    Diclofenac Sodium (VOLTAREN) 1 %; Apply 2 g topically 4 (four) times a day as needed (shoulder pain)    Non-organic psychosis (HCC)  Per chart review patient seems to have some episodes of sundowning/psychosis.  She has also displayed concern during my encounter for memory impairment.  She has MRI brain ordered through PCP which will be rescheduled for further investigation.  Continue follow-up with PCP.            HEALTH MAINTENANCE     Immunization History   Administered Date(s) Administered    COVID-19 PFIZER VACCINE 0.3 ML IM 02/03/2021, 02/26/2021, 01/22/2022    COVID-19 Pfizer mRNA vacc PF francesca-sucrose 12 yr and older (Comirnaty) 01/22/2024    INFLUENZA 11/09/2022, 09/20/2023    Influenza Split High Dose Preservative Free IM 10/18/2016, 10/16/2017    Influenza, high dose seasonal 0.7 mL 10/31/2018, 09/30/2019, 10/13/2020, 10/21/2021, 11/09/2022, 09/20/2023    Pneumococcal Conjugate 13-Valent 02/17/2017       CHIEF COMPLAINT     Chief Complaint   Patient presents with    Shoulder Pain     R shoulder  On going for months       HISTORY OF PRESENT ILLNESS     Ms. Denisse Jolly is an 89-year-old female with past medical history of hypertension, anxiety/depression, fibromyalgia, insomnia, hyperlipidemia, palpitations. She presents to clinic today for concern of shoulder pain.    REVIEW OF SYSTEMS     Review of Systems   Constitutional:  Negative for chills and fever.   HENT:  Negative for congestion, rhinorrhea and sore throat.    Respiratory:  Negative for cough, shortness of breath and wheezing.    Cardiovascular:  Negative for chest pain and leg swelling.   Gastrointestinal:  Negative for abdominal distention, abdominal pain, constipation, diarrhea, nausea and  vomiting.   Genitourinary:  Negative for difficulty urinating and dysuria.   Musculoskeletal:  Positive for arthralgias. Negative for back pain.   Skin:  Negative for rash and wound.   Neurological:  Negative for dizziness, syncope, weakness, light-headedness and headaches.   Psychiatric/Behavioral:  Negative for agitation, behavioral problems and confusion.      OBJECTIVE     Vitals:    07/08/25 1332   Pulse: 77   SpO2: 97%     Physical Exam  Constitutional:       Appearance: Normal appearance.   HENT:      Right Ear: External ear normal.      Left Ear: External ear normal.     Cardiovascular:      Rate and Rhythm: Normal rate and regular rhythm.      Pulses: Normal pulses.      Heart sounds: Normal heart sounds. No murmur heard.  Pulmonary:      Effort: Pulmonary effort is normal. No respiratory distress.      Breath sounds: Normal breath sounds. No wheezing, rhonchi or rales.   Abdominal:      General: Abdomen is flat. Bowel sounds are normal. There is no distension.      Palpations: Abdomen is soft.      Tenderness: There is no abdominal tenderness.     Musculoskeletal:         General: Tenderness (right shoulder pain) present.      Right lower leg: No edema.      Left lower leg: No edema.     Skin:     General: Skin is warm and dry.     Neurological:      Mental Status: She is alert and oriented to person, place, and time.      Gait: Gait abnormal (wheelchair).     Psychiatric:         Mood and Affect: Mood normal.         Behavior: Behavior normal.         Thought Content: Thought content normal.       CURRENT MEDICATIONS   Current Medications[1]    PAST MEDICAL & SURGICAL HISTORY   Past Medical History[2]  Past Surgical History[3]  SOCIAL & FAMILY HISTORY     Social History     Socioeconomic History    Marital status: /Civil Union     Spouse name: Not on file    Number of children: Not on file    Years of education: Not on file    Highest education level: Not on file   Occupational History    Not on  file   Tobacco Use    Smoking status: Never    Smokeless tobacco: Never   Vaping Use    Vaping status: Never Used   Substance and Sexual Activity    Alcohol use: Not Currently    Drug use: Never    Sexual activity: Not Currently   Other Topics Concern    Not on file   Social History Narrative    Not on file     Social Drivers of Health     Financial Resource Strain: Not At Risk (5/8/2025)    Received from [facility]    Financial Insecurity     In the last 12 months did you skip medications to save money?: No     In the last 12 months was there a time when you needed to see a doctor but could not because of cost?: No   Food Insecurity: No Food Insecurity (5/8/2025)    Received from [facility]    Food Insecurity     In the last 12 months did you ever eat less than you felt you should because there wasn't enough money for food?: No   Transportation Needs: No Transportation Needs (5/8/2025)    Received from [facility]    Transportation Needs     In the last 12 months have you ever had to go without healthcare because you didn't have a way to get there?: No   Physical Activity: Not on file   Stress: Not on file   Social Connections: Socially Integrated (5/8/2025)    Received from [facility]    Social Connection     Do you often feel lonely?: No   Intimate Partner Violence: Not on file   Housing Stability: Not At Risk (5/8/2025)    Received from [facility]    Housing Stability     Are you worried that in the next 2 months you may not have stable housing?: No     Social History     Substance and Sexual Activity   Alcohol Use Not Currently       Social History     Substance and Sexual Activity   Drug Use Never     Tobacco Use History[4]  Family History[5]  ==  Garcia Brock DO  [facility]  [address] [place] [address]  Office: [phone]  Fax: [phone]         [1]   Current  Outpatient Medications:     Diclofenac Sodium (VOLTAREN) 1 %, Apply 2 g topically 4 (four) times a day as needed (shoulder pain), Disp: 100 g, Rfl: 1    Acetaminophen Extra Strength 500 MG TABS, TAKE 2 CAPLETS (1000MG) ORALLY EVERY 8 HOURS (OSTEOARTHRITIS) *NOT TO EXCEED 4GMS APAP/24HR*, Disp: 180 tablet, Rfl: 2    Allergy Relief 10 MG tablet, GIVE 1 TABLET ORALLY DAILY (ALLERGIES), Disp: 30 tablet, Rfl: 11    ALPRAZolam (XANAX) 0.25 mg tablet, TAKE 1 TABLET ORALLY IN THE MORNING (ANXIETY) (CONTROL) *REORDER*;TAKE 2 TABLETS (0.5MG) ORALLY AT BEDTIME (ANXIETY) (CONTROL) *REORDER*, Disp: 90 tablet, Rfl: 0    aspirin 81 mg chewable tablet, CHEW 1 TABLET AND SWALLOW ORALLY THREE TIMES WEEKLY MONDAY, WEDNESDAY & FRIDAY (CARDIAC DISEASE), Disp: 13 tablet, Rfl: 11    calcium carbonate (TUMS) 500 mg chewable tablet, Chew 1 tablet daily, Disp: , Rfl:     Chest Congestion Relief 100 MG/5ML oral liquid, GIVE 10ML (200MG) ORALLY THREE TIMES DAILY AS NEEDED FOR COUGH *REORDER*, Disp: 473 mL, Rfl: 5    ciclopirox (LOPROX) 0.77 % cream, , Disp: , Rfl:     cyclopentolate (CYCLOGYL) 1 % ophthalmic solution, INSTILL 1 DROP INTO RIGHT EYE THREE TIMES DAILY (DETACHED RENTINAL SURGERY) *REORDER*, Disp: 2 mL, Rfl: 5    docusate sodium (COLACE) 100 mg capsule, Take 1 capsule (100 mg total) by mouth 2 (two) times a day, Disp: 60 capsule, Rfl: 5    Emollient (Eucerin Advanced Repair) CREA, Apply topically to both upper extremities twice a day for dry skin, Disp: 454 g, Rfl: 4    ergocalciferol (VITAMIN D2) 50,000 units, GIVE 1 CAPSULE ORALLY EVERY WEEK (SUPPLEMENT) (DO NOT CRUSH) *REORDER*, Disp: 4 capsule, Rfl: 11    estrogens, conjugated (Premarin) vaginal cream, Apply 1/4 inch with finger to vaginal area at bedtime on Mondays, Wednesdays  and Fridays, Disp: 30 g, Rfl: 5    famotidine (PEPCID) 20 mg tablet, GIVE 1 TABLET ORALLY DAILY (GASTROESOPHAGEAL REFLUX DISEASE), Disp: 30 tablet, Rfl: 11    fluticasone (FLONASE) 50 mcg/act nasal  spray, INHALE 1 SPRAY INTO EACH NOSTRIL TWICE DAILY (ALLERGIES) *REORDER*, Disp: 16 g, Rfl: 11    gabapentin (NEURONTIN) 100 mg capsule, 100 mg in the morning 100 mg in the afternoon and 200 mg at bedtime, Disp: 120 capsule, Rfl: 5    ketoconazole (NIZORAL) 2 % shampoo, , Disp: , Rfl:     Lidocaine Pain Relief 4 % PTCH, APPLY 1/2 PATCH TOPICALLY TO EACH KNEE AS NEEDED FOR PAIN REMOVE AFTER 12 HOURS *REORDER*;APPLY 1 PATCH TOPICALLY TO RIGHT SHOULDER AS NEEDED FOR PAIN  REMOVE AFTER 12 HOURS *REORDER*, Disp: 60 patch, Rfl: 11    lisinopril (ZESTRIL) 30 mg tablet, Take 1 tablet (30 mg total) by mouth daily, Disp: 30 tablet, Rfl: 6    loperamide (IMODIUM A-D) 2 MG tablet, Take 1 tablet (2 mg total) by mouth as needed for diarrhea 1 pill after each liquid bowel movement up to 6 pills in 24 hours, Disp: 30 tablet, Rfl: 0    Lumigan 0.01 % ophthalmic drops, , Disp: , Rfl:     meloxicam (MOBIC) 7.5 mg tablet, TAKE 1 TABLET ORALLY IN THE MORNING WITH MEAL, Disp: 30 tablet, Rfl: 11    mometasone (ELOCON) 0.1 % cream, APPLY TOPICALLY TO AFFECTED AREA AT BEDTIME (SKIN RASH) *REORDER*, Disp: 45 g, Rfl: 11    Mouthwashes (Biotene Dry Mouth) LIQD, 5 cc swish in the mouth every 6 hours as needed for dry mouth, Disp: 473 mL, Rfl: 11    nystatin (MYCOSTATIN) powder, Apply topically 2 (two) times a day, Disp: 30 g, Rfl: 11    polyethylene glycol (GLYCOLAX) 17 GM/SCOOP powder, Take 17 g by mouth daily as needed (for constipation), Disp: 116 g, Rfl: 2    Restasis 0.05 % ophthalmic emulsion, INSTILL 1 DROP INTO EACH EYE TWICE DAILY FOR DRY EYE *BRAND PER INSURANCE* *REORDER*, Disp: 5.5 mL, Rfl: 5    Simbrinza 1-0.2 % SUSP, , Disp: , Rfl:     simethicone (MYLICON,GAS-X) 180 MG capsule, Take 1 capsule (180 mg total) by mouth 4 (four) times a day Patient may have at bedside, Disp: 120 capsule, Rfl: 6    sodium chloride (OCEAN) 0.65 % nasal spray, 1 spray into each nostril 3 (three) times a day, Disp: 15 mL, Rfl: 3  [2]   Past Medical  History:  Diagnosis Date    Dementia (HCC)     Epistaxis     RESOLVED: 2/18/17    Saphenous vein thrombophlebitis, right 2012   [3]   Past Surgical History:  Procedure Laterality Date    GALLBLADDER SURGERY  2006   [4]   Social History  Tobacco Use   Smoking Status Never   Smokeless Tobacco Never   [5]   Family History  Problem Relation Name Age of Onset    Hypertension Mother

## 2025-07-08 NOTE — TELEPHONE ENCOUNTER
Denisse marin was seen by dr. Brock today but there is no f/u scheduled.  She did not have her brain mri done.  Was scheduled for 6/19.  I gave her new order and asking [facility] to arrange new date.

## 2025-07-08 NOTE — ASSESSMENT & PLAN NOTE
Per chart review patient seems to have some episodes of sundowning/psychosis.  She has also displayed concern during my encounter for memory impairment.  She has MRI brain ordered through PCP which will be rescheduled for further investigation.  Continue follow-up with PCP.

## 2025-07-22 ENCOUNTER — TELEPHONE (OUTPATIENT)
Age: 89
End: 2025-07-22

## 2025-07-28 NOTE — TELEPHONE ENCOUNTER
Arabella Dominguez from [facility] called because they will not be able to make the appointment for 8/12/25. They are not sure how urgent the MRI Follow up will be. The next available date would be late August 8/28/29. They would like to confirm and schedule the appointment with someone in the office.

## 2025-08-04 ENCOUNTER — TELEPHONE (OUTPATIENT)
Age: 89
End: 2025-08-04